# Patient Record
Sex: FEMALE | Race: WHITE | NOT HISPANIC OR LATINO | Employment: OTHER | URBAN - METROPOLITAN AREA
[De-identification: names, ages, dates, MRNs, and addresses within clinical notes are randomized per-mention and may not be internally consistent; named-entity substitution may affect disease eponyms.]

---

## 2021-11-02 DIAGNOSIS — E78.5 HYPERLIPIDEMIA LDL GOAL <100: Primary | ICD-10-CM

## 2022-01-06 ENCOUNTER — HOSPITAL ENCOUNTER (OUTPATIENT)
Dept: CT IMAGING | Facility: CLINIC | Age: 77
Discharge: HOME | End: 2022-01-06
Attending: FAMILY MEDICINE | Admitting: FAMILY MEDICINE
Payer: COMMERCIAL

## 2022-01-06 DIAGNOSIS — E78.5 HYPERLIPIDEMIA LDL GOAL <100: ICD-10-CM

## 2022-01-06 PROCEDURE — 75571 CT HRT W/O DYE W/CA TEST: CPT

## 2022-01-06 PROCEDURE — 75571 CT HRT W/O DYE W/CA TEST: CPT | Mod: 26 | Performed by: INTERNAL MEDICINE

## 2022-02-01 ENCOUNTER — TRANSFERRED RECORDS (OUTPATIENT)
Dept: HEALTH INFORMATION MANAGEMENT | Facility: CLINIC | Age: 77
End: 2022-02-01
Payer: COMMERCIAL

## 2022-02-01 LAB
ALT SERPL-CCNC: 16 U/L (ref 6–29)
AST SERPL-CCNC: 22 U/L (ref 10–35)
CHOLESTEROL (EXTERNAL): 242 MG/DL
CREATININE (EXTERNAL): 0.78 MG/DL (ref 0.6–0.93)
GFR ESTIMATED (EXTERNAL): 73 ML/MIN/1.73M2
GFR ESTIMATED (IF AFRICAN AMERICAN) (EXTERNAL): 84 ML/MIN/1.73M2
GLUCOSE (EXTERNAL): 93 MG/DL (ref 65–99)
HDLC SERPL-MCNC: 63 MG/DL
LDL CHOLESTEROL CALCULATED (EXTERNAL): 154 MG/DL
NON HDL CHOLESTEROL (EXTERNAL): 179 MG/DL
POTASSIUM (EXTERNAL): 3.8 MMOL/L (ref 3.5–5.3)
TRIGLYCERIDES (EXTERNAL): 126 MG/DL

## 2022-03-25 ENCOUNTER — MEDICAL CORRESPONDENCE (OUTPATIENT)
Dept: HEALTH INFORMATION MANAGEMENT | Facility: CLINIC | Age: 77
End: 2022-03-25
Payer: COMMERCIAL

## 2022-03-25 ENCOUNTER — TRANSFERRED RECORDS (OUTPATIENT)
Dept: HEALTH INFORMATION MANAGEMENT | Facility: CLINIC | Age: 77
End: 2022-03-25
Payer: COMMERCIAL

## 2022-03-25 DIAGNOSIS — I48.91 NEW ONSET A-FIB (H): Primary | ICD-10-CM

## 2022-04-20 ENCOUNTER — HOSPITAL ENCOUNTER (OUTPATIENT)
Dept: CARDIOLOGY | Facility: CLINIC | Age: 77
Discharge: HOME | End: 2022-04-20
Attending: PHYSICIAN ASSISTANT | Admitting: PHYSICIAN ASSISTANT
Payer: COMMERCIAL

## 2022-04-20 DIAGNOSIS — I48.91 NEW ONSET A-FIB (H): ICD-10-CM

## 2022-04-20 LAB — LVEF ECHO: NORMAL

## 2022-04-20 PROCEDURE — 93306 TTE W/DOPPLER COMPLETE: CPT

## 2022-04-20 PROCEDURE — 93244 EXT ECG>48HR<7D REV&INTERPJ: CPT | Performed by: INTERNAL MEDICINE

## 2022-04-20 PROCEDURE — 93242 EXT ECG>48HR<7D RECORDING: CPT

## 2022-04-20 PROCEDURE — 93306 TTE W/DOPPLER COMPLETE: CPT | Mod: 26 | Performed by: INTERNAL MEDICINE

## 2022-04-29 DIAGNOSIS — I48.91 NEW ONSET A-FIB (H): Primary | ICD-10-CM

## 2022-05-04 ENCOUNTER — OFFICE VISIT (OUTPATIENT)
Dept: CARDIOLOGY | Facility: CLINIC | Age: 77
End: 2022-05-04
Payer: COMMERCIAL

## 2022-05-04 VITALS
HEIGHT: 70 IN | WEIGHT: 182 LBS | SYSTOLIC BLOOD PRESSURE: 138 MMHG | BODY MASS INDEX: 26.05 KG/M2 | HEART RATE: 78 BPM | DIASTOLIC BLOOD PRESSURE: 88 MMHG | OXYGEN SATURATION: 97 %

## 2022-05-04 DIAGNOSIS — I10 ESSENTIAL HYPERTENSION: ICD-10-CM

## 2022-05-04 DIAGNOSIS — I77.89 ASCENDING AORTA ENLARGEMENT (H): Primary | ICD-10-CM

## 2022-05-04 DIAGNOSIS — I49.1 ATRIAL PREMATURE CONTRACTIONS: ICD-10-CM

## 2022-05-04 PROCEDURE — 93000 ELECTROCARDIOGRAM COMPLETE: CPT | Performed by: INTERNAL MEDICINE

## 2022-05-04 PROCEDURE — 99214 OFFICE O/P EST MOD 30 MIN: CPT | Performed by: INTERNAL MEDICINE

## 2022-05-04 RX ORDER — ATENOLOL 100 MG/1
100 TABLET ORAL DAILY
COMMUNITY
End: 2024-05-03

## 2022-05-04 NOTE — LETTER
5/4/2022    Bill Castro MD  [facility] [address]  [place] [address]    RE: Mercedes Nelson       Dear Colleague,     I had the pleasure of seeing Mercedes Nelson in the [facility].  HISTORY OF PRESENT ILLNESS:  Dx with atrial fibrillation. 2 Kids 3 grandkids  10 years . [employer].     Allergies   etoh none tobacco  yardwork    No family history dad with heart disease sisters with heart disease one PPM        Orders this Visit:  Orders Placed This Encounter   Procedures     EKG 12-lead complete w/read - Clinics (performed today)     Orders Placed This Encounter   Medications     atenolol (TENORMIN) 100 MG tablet     Sig: Take 100 mg by mouth daily     Red Yeast Rice Extract (RED YEAST RICE PO)     ELDERBERRY PO     Medications Discontinued During This Encounter   Medication Reason     atenolol (TENORMIN) 50 MG tablet Medication Reconciliation Clean Up     Calcium Carbonate (CALCIUM 500 PO) Stopped by Patient     aspirin EC 81 MG EC tablet Stopped by Patient     meclizine (ANTIVERT) 25 MG tablet Stopped by Patient       Encounter Diagnosis   Name Primary?     New onset a-fib (H)        CURRENT MEDICATIONS:  Current Outpatient Medications   Medication Sig Dispense Refill     atenolol (TENORMIN) 100 MG tablet Take 100 mg by mouth daily       B Complex Vitamins (VITAMIN  B COMPLEX) tablet Take 1 tablet by mouth daily       Coenzyme Q10 (COQ-10 PO) Take 1 tablet by mouth daily       ELDERBERRY PO        Multiple Vitamin (MULTI-VITAMIN) per tablet Take 1 tablet by mouth daily       Red Yeast Rice Extract (RED YEAST RICE PO)        VITAMIN D, CHOLECALCIFEROL, PO Take 1 capsule by mouth daily         ALLERGIES   No Known Allergies    PAST MEDICAL, SURGICAL, FAMILY, SOCIAL HISTORY:  History was reviewed and updated as needed, see medical record.    Review of Systems:  A 12-point review of systems was completed, see medical record  "for detailed review of systems information.    Physical Exam:  Vitals: /88 (BP Location: Right arm, Patient Position: Sitting, Cuff Size: Adult Regular)   Pulse 78   Ht 1.778 m (5' 10\")   Wt 82.6 kg (182 lb)   SpO2 97%   BMI 26.11 kg/m      Constitutional:           Skin:           Head:           Eyes:           ENT:           Neck:           Chest:           Cardiac:                    Abdomen:           Vascular:                                        Extremities and Back:           Neurological:           ASSESSMENT: should be on statin.        RECOMMENDATIONS:   Continue atenolol  Echocardiogram in one year  Statin therapy        Recent Lab Results:  LIPID RESULTS:  No results found for: CHOL, HDL, LDL, TRIG, CHOLHDLRATIO    LIVER ENZYME RESULTS:  No results found for: AST, ALT    CBC RESULTS:  Lab Results   Component Value Date    WBC 7.0 10/06/2012    RBC 4.74 10/06/2012    HGB 14.8 10/06/2012    HCT 42.3 10/06/2012    MCV 89 10/06/2012    MCH 31.2 10/06/2012    MCHC 35.0 10/06/2012    RDW 13.0 10/06/2012     10/06/2012       BMP RESULTS:  Lab Results   Component Value Date     10/07/2012    POTASSIUM 3.6 10/07/2012    CHLORIDE 106 10/07/2012    CO2 25 10/07/2012    ANIONGAP 4 (L) 10/07/2012    GLC 88 10/07/2012    BUN 10 10/07/2012    CR 0.67 10/07/2012    GFRESTIMATED 88 10/07/2012    GFRESTBLACK >90 10/07/2012    SARAH 8.7 10/07/2012        A1C RESULTS:  No results found for: A1C    INR RESULTS:  No results found for: INR    We greatly appreciate the opportunity to be involved in the care of your patient, Mercedes Nelson.    Sincerely,  Seferino Muhammad MD      CC  Farnaz Billings, AKIN  [facility]  [address]  [place] [address]     Thank you for allowing me to participate in the care of your patient.      Sincerely,     Seferino Muhammad MD     [facility]  "

## 2022-05-04 NOTE — PROGRESS NOTES
"HISTORY OF PRESENT ILLNESS:  Dx with atrial fibrillation. 2 Kids 3 grandkids  10 years . [employer].     Allergies   etoh none tobacco  yardwork    No family history dad with heart disease sisters with heart disease one PPM        Orders this Visit:  Orders Placed This Encounter   Procedures     EKG 12-lead complete w/read - Clinics (performed today)     Orders Placed This Encounter   Medications     atenolol (TENORMIN) 100 MG tablet     Sig: Take 100 mg by mouth daily     Red Yeast Rice Extract (RED YEAST RICE PO)     ELDERBERRY PO     Medications Discontinued During This Encounter   Medication Reason     atenolol (TENORMIN) 50 MG tablet Medication Reconciliation Clean Up     Calcium Carbonate (CALCIUM 500 PO) Stopped by Patient     aspirin EC 81 MG EC tablet Stopped by Patient     meclizine (ANTIVERT) 25 MG tablet Stopped by Patient       Encounter Diagnosis   Name Primary?     New onset a-fib (H)        CURRENT MEDICATIONS:  Current Outpatient Medications   Medication Sig Dispense Refill     atenolol (TENORMIN) 100 MG tablet Take 100 mg by mouth daily       B Complex Vitamins (VITAMIN  B COMPLEX) tablet Take 1 tablet by mouth daily       Coenzyme Q10 (COQ-10 PO) Take 1 tablet by mouth daily       ELDERBERRY PO        Multiple Vitamin (MULTI-VITAMIN) per tablet Take 1 tablet by mouth daily       Red Yeast Rice Extract (RED YEAST RICE PO)        VITAMIN D, CHOLECALCIFEROL, PO Take 1 capsule by mouth daily         ALLERGIES   No Known Allergies    PAST MEDICAL, SURGICAL, FAMILY, SOCIAL HISTORY:  History was reviewed and updated as needed, see medical record.    Review of Systems:  A 12-point review of systems was completed, see medical record for detailed review of systems information.    Physical Exam:  Vitals: /88 (BP Location: Right arm, Patient Position: Sitting, Cuff Size: Adult Regular)   Pulse 78   Ht 1.778 m (5' 10\")   Wt 82.6 kg (182 lb)   SpO2 97%   BMI 26.11 " kg/m      Constitutional:           Skin:           Head:           Eyes:           ENT:           Neck:           Chest:           Cardiac:                    Abdomen:           Vascular:                                        Extremities and Back:           Neurological:           ASSESSMENT: should be on statin.        RECOMMENDATIONS:   Continue atenolol  Echocardiogram in one year  Statin therapy        Recent Lab Results:  LIPID RESULTS:  No results found for: CHOL, HDL, LDL, TRIG, CHOLHDLRATIO    LIVER ENZYME RESULTS:  No results found for: AST, ALT    CBC RESULTS:  Lab Results   Component Value Date    WBC 7.0 10/06/2012    RBC 4.74 10/06/2012    HGB 14.8 10/06/2012    HCT 42.3 10/06/2012    MCV 89 10/06/2012    MCH 31.2 10/06/2012    MCHC 35.0 10/06/2012    RDW 13.0 10/06/2012     10/06/2012       BMP RESULTS:  Lab Results   Component Value Date     10/07/2012    POTASSIUM 3.6 10/07/2012    CHLORIDE 106 10/07/2012    CO2 25 10/07/2012    ANIONGAP 4 (L) 10/07/2012    GLC 88 10/07/2012    BUN 10 10/07/2012    CR 0.67 10/07/2012    GFRESTIMATED 88 10/07/2012    GFRESTBLACK >90 10/07/2012    SARAH 8.7 10/07/2012        A1C RESULTS:  No results found for: A1C    INR RESULTS:  No results found for: INR    We greatly appreciate the opportunity to be involved in the care of your patient, Mercedes Nelson.    Sincerely,  Seferino Muhammad MD      CC  Farnaz Billings PA-C  [facility]  [address] [facility] [address] [facility] [address]  [place] [address]

## 2022-05-04 NOTE — PROGRESS NOTES
Service Date: 2022    CARDIOLOGY CONSULTATION    HISTORY OF PRESENT ILLNESS:  Mercedes Nelson, a 76-year-old woman with osteoarthritis, dyslipidemia and hypertension, was evaluated in consultation at your request for an abnormal preoperative ECG.    Recently, the patient was seen in your office for a preoperative exam prior to cataract surgery.  The patient's screening ECG was abnormal and concerns were raised regarding possible atrial fibrillation.  Anticoagulation  transthoracic echocardiogram and ambulatory monitor were advised along with cardiology consultation.    The patient does not describe any chest, arm, neck, jaw or back discomfort with exertion.  She has been free of syncope, lightheadedness or sustained palpitation.  She denies smoking history, previous MI or diabetes mellitus.    ALLERGIES:  None known.    HABITS:  The patient does not abuse tobacco or alcohol.    SOCIAL HISTORY:  The patient is .  She has 2 children.  She used to work as an .  She currently enjoys gardening.    FAMILY HISTORY:  Her mother  at age 94.  She had hypertension, had mastectomy.  Her father had an MI at age 58 and  at age 63.  She has 2 sisters.  One sister had heart problems with a stent.  The other sister had an MI at age 56.    REVIEW OF SYSTEMS:  A 12-point review of systems was performed.  Outside the issues mentioned in HPI, there are no other complaints.    MEDICATIONS:    1.  Atenolol 100 mg daily.    2.  She is on a number of over-the-counter homeopathic medications.    PHYSICAL EXAMINATION:    GENERAL:  Exam today demonstrates a very pleasant, cooperative and intelligent 76-year-old woman who looks younger than stated age.  VITAL SIGNS:  Her blood pressure was 138/88, her heart rate 78 and regular.  Her height is 1.8 meters, her weight is 82.6 kg, her BMI is 26.  RESPIRATORY:  Her lungs are clear to percussion and auscultation.  CARDIOVASCULAR:  Shows a normal S1 with  a normal S2.  There is no S3.  There is no murmur, rub or click.  Her pulses are symmetrical in the carotid, radial, brachial femoral, popliteal, dorsalis pedis and posterior tibials.  ABDOMINAL EXAMINATION:  Bowel sounds are present in all 4 quadrants.  Liver percusses to 7 cm.  Spleen is not palpable.  The aorta is not tender.  LOWER EXTREMITIES:  There is no swelling, cyanosis or clubbing.  NEUROLOGIC:  Cranial nerves II through XII are intact.  Her strength equal and symmetrical.  She displays normal insight and judgment.    IMAGING STUDIES:  I have reviewed both her ECGs from the office and from the ambulatory monitor.  Although the patient has frequent atrial premature contractions, there has been no documentation of atrial fibrillation.  Her echo shows normal ejection fraction with marked left atrial enlargement but no significant valvular stenosis or insufficiency. Her ECG today shows a sinus rhythm with frequent PACs    The patient underwent a CT coronary calcium scan in the past which demonstrated very mild calcification but there was moderate dilation of the ascending aorta measured at 43 cm.    ASSESSMENT:  Ms. Nelson has frequent premature atrial contractions, left atrial enlargement and is at risk for future atrial fibrillation but thus far, does not have documented atrial fibrillation. I would not recommend anticoagulation in the absence of documented atrial fibrillation. .  She will require close surveillance, as she is at risk for developing atrial arrhythmias in the future.    In view of the patient's moderate aortic dilation and coronary calcification, I would advise statin therapy.  It would be reasonable to start atorvastatin at 20-40 mg daily.  She wants to discuss this recommendation with you in more detail.    We discussed the benefits of a Mediterranean-style weight loss diet and a regular exercise program.  We prefer to maintain her blood pressure in the range of 130/80 Hg  if possible.   If she cannot achieve this goal with exercise and diet, you may wish to consider switching her from atenolol to carvedilol, a more potent anti-hypertensive beta blocker.     RECOMMENDATIONS:    1.  Mediterranean-style weight loss diet.  2.  Regular exercise program.  3.  No indication for anticoagulation at this time.  4.  Followup visit with me in about 1 year with an echo at that time to reassess the aortic dimensions and EKG to exclude atrial fibrillation .  5.  I have asked the patient to discuss statin therapy with you.  We would atorvastatin 20 to 40 mg daily.   6. If her blood pressure remains above 130 /80 despite lifestyle intervention, you  may wish to consider switching her from atenolol to an equivalent dose of carvedilol.    We appreciate the opportunity to care for your patient, Mikecy Mcpherson.    Seferino Muhammad MD     cc:  ward Echevarria,[facility]  [address] [facility], [address] [address]    Seferino Muhammad MD        D: 2022   T: 2022   MT: kendall    Name:     LILIAN MCPHERSONIAN WARDAparna  MRN:      [id number]        Account:      [id number]   :      1945           Service Date: 2022       Document: [id number]

## 2022-06-05 ENCOUNTER — HEALTH MAINTENANCE LETTER (OUTPATIENT)
Age: 77
End: 2022-06-05

## 2022-10-15 ENCOUNTER — HEALTH MAINTENANCE LETTER (OUTPATIENT)
Age: 77
End: 2022-10-15

## 2023-02-13 ENCOUNTER — TRANSFERRED RECORDS (OUTPATIENT)
Dept: HEALTH INFORMATION MANAGEMENT | Facility: CLINIC | Age: 78
End: 2023-02-13

## 2023-03-20 ENCOUNTER — TRANSFERRED RECORDS (OUTPATIENT)
Dept: HEALTH INFORMATION MANAGEMENT | Facility: CLINIC | Age: 78
End: 2023-03-20

## 2023-03-26 ENCOUNTER — HEALTH MAINTENANCE LETTER (OUTPATIENT)
Age: 78
End: 2023-03-26

## 2023-06-19 ENCOUNTER — MEDICAL CORRESPONDENCE (OUTPATIENT)
Dept: HEALTH INFORMATION MANAGEMENT | Facility: CLINIC | Age: 78
End: 2023-06-19
Payer: COMMERCIAL

## 2023-06-29 ENCOUNTER — HOSPITAL ENCOUNTER (OUTPATIENT)
Dept: CARDIOLOGY | Facility: CLINIC | Age: 78
Discharge: HOME | End: 2023-06-29
Attending: INTERNAL MEDICINE | Admitting: INTERNAL MEDICINE
Payer: COMMERCIAL

## 2023-06-29 ENCOUNTER — MEDICAL CORRESPONDENCE (OUTPATIENT)
Dept: HEALTH INFORMATION MANAGEMENT | Facility: CLINIC | Age: 78
End: 2023-06-29

## 2023-06-29 DIAGNOSIS — I10 ESSENTIAL HYPERTENSION: ICD-10-CM

## 2023-06-29 DIAGNOSIS — I49.1 ATRIAL PREMATURE CONTRACTIONS: ICD-10-CM

## 2023-06-29 DIAGNOSIS — I77.89 ASCENDING AORTA ENLARGEMENT (H): ICD-10-CM

## 2023-06-29 LAB — LVEF ECHO: NORMAL

## 2023-06-29 PROCEDURE — 93306 TTE W/DOPPLER COMPLETE: CPT

## 2023-06-29 PROCEDURE — 93306 TTE W/DOPPLER COMPLETE: CPT | Mod: 26 | Performed by: INTERNAL MEDICINE

## 2023-07-13 ENCOUNTER — OFFICE VISIT (OUTPATIENT)
Dept: CARDIOLOGY | Facility: CLINIC | Age: 78
End: 2023-07-13
Attending: INTERNAL MEDICINE
Payer: COMMERCIAL

## 2023-07-13 VITALS
HEIGHT: 70 IN | BODY MASS INDEX: 26.13 KG/M2 | SYSTOLIC BLOOD PRESSURE: 138 MMHG | WEIGHT: 182.5 LBS | HEART RATE: 80 BPM | DIASTOLIC BLOOD PRESSURE: 88 MMHG | OXYGEN SATURATION: 98 %

## 2023-07-13 DIAGNOSIS — I49.1 ATRIAL PREMATURE CONTRACTIONS: ICD-10-CM

## 2023-07-13 DIAGNOSIS — I10 ESSENTIAL HYPERTENSION: ICD-10-CM

## 2023-07-13 DIAGNOSIS — I77.89 ASCENDING AORTA ENLARGEMENT (H): ICD-10-CM

## 2023-07-13 PROCEDURE — 99214 OFFICE O/P EST MOD 30 MIN: CPT | Performed by: INTERNAL MEDICINE

## 2023-07-13 PROCEDURE — 93000 ELECTROCARDIOGRAM COMPLETE: CPT | Performed by: INTERNAL MEDICINE

## 2023-07-13 NOTE — LETTER
7/13/2023    Bill Castro MD  [address]  [facility] [address]    RE: Mercedes Nelson       Dear Colleague,     I had the pleasure of seeing Mercedes Nelson in the [facility].  HISTORY OF PRESENT ILLNESS:  Mercedes Nelson, a 76-year-old woman with osteoarthritis, dyslipidemia and hypertension frequent premature atrial contractions, and ascending aortic aneurysm was seen today at your request for follow up.    I saw the patient initially on 5/4/2022 to evaluate an abnormal  ECG and moderate dilation of the ascending aorta noted on a screening echocardiogram prior to elective cataract surgery.  There had been concern that the patient was in atrial fibrillation, ever view of all of her ECGs and ambulatory monitoring showed only premature atrial contractions with low atrial voltage but no evidence of atrial fibrillation.  We advised continued guideline directed medical therapy hypertension and vascular disease and advised follow-up in 1 year with an echo and an ECG    Patient's been free of cardiovascular symptoms since last seen.  She specifically denies chest pain dyspnea, focal neurologic deficit, frequent palpitations lightheadedness dizziness or sustained tachycardia.  I have personally reviewed her ECG and echo from today.  She is in a sinus rhythm although atrial that is low.  Her echo shows no change in the size of the ascending aorta which still measures about 4.0 cm.    The patient had a mechanical fall several weeks ago resulted in a nondisplaced fracture of one of her hand bones and she is currently in a arm cast.      Orders this Visit:  Orders Placed This Encounter   Procedures    EKG 12-lead complete w/read - Clinics (performed today)     No orders of the defined types were placed in this encounter.    There are no discontinued medications.    Encounter Diagnoses   Name Primary?    Atrial premature contractions     Ascending aorta enlargement (H)     Essential  "hypertension        CURRENT MEDICATIONS:  Current Outpatient Medications   Medication Sig Dispense Refill    atenolol (TENORMIN) 100 MG tablet Take 100 mg by mouth daily      B Complex Vitamins (VITAMIN  B COMPLEX) tablet Take 1 tablet by mouth daily      Coenzyme Q10 (COQ-10 PO) Take 1 tablet by mouth daily      ELDERBERRY PO       Multiple Vitamin (MULTI-VITAMIN) per tablet Take 1 tablet by mouth daily      Red Yeast Rice Extract (RED YEAST RICE PO)       VITAMIN D, CHOLECALCIFEROL, PO Take 1 capsule by mouth daily         ALLERGIES   No Known Allergies    PAST MEDICAL, SURGICAL, FAMILY, SOCIAL HISTORY:  History was reviewed and updated as needed, see medical record.    Review of Systems:  A 12-point review of systems was completed, see medical record for detailed review of systems information.    Physical Exam:  Vitals: BP (!) 142/90 (BP Location: Right arm, Patient Position: Sitting, Cuff Size: Adult Large)   Pulse 80   Ht 1.778 m (5' 10\")   Wt 82.8 kg (182 lb 8 oz)   SpO2 98%   BMI 26.19 kg/m        Pleasant, cooperative, youthful appearing    Lungs clear to percussion auscultation    Cardiovascular normal S1 normal S2 no murmur or click    Pulses full and symmetrical in the carotid radial brachial femoral popliteal dorsalis pedis and posterior tibials.      ASSESSMENT: The patient remains asymptomatic, in a regular sinus rhythm, with no change in mild dilation of the ascending aorta since her last evaluation.  At this point I do not believe any further cardiac testing or intervention is warranted in the absence of new symptoms.  We have discussed the benefits of a Mediterranean-style diet, regular exercise program, and compliance with blood pressure and lipid-lowering therapy.  I would recommend continued close follow-up for blood pressure with a target blood pressure of 130/80 or less.       RECOMMENDATIONS:   1) Continue present medical therapy  2) Regular exercise program 30 to 40 minutes daily   3) " Mediterranean style diet  4) follow up prn      Recent Lab Results:    ECG demonstrates a normal sinus rhythm with low atrial voltage otherwise unremarkable.    Echo shows a normal ejection fraction of 65%, mild concentric LVH, no significant valvular stenosis or insufficiency.  The right measures 3.9 cm in the ascending aorta measures 4.0 cm, unchanged since her last echo.  LIPID RESULTS:  Lab Results   Component Value Date    TRIG 66 02/13/2023       LIVER ENZYME RESULTS:  No results found for: AST, ALT    CBC RESULTS:  Lab Results   Component Value Date    WBC 7.0 10/06/2012    RBC 4.74 10/06/2012    HGB 14.8 10/06/2012    HCT 42.3 10/06/2012    MCV 89 10/06/2012    MCH 31.2 10/06/2012    MCHC 35.0 10/06/2012    RDW 13.0 10/06/2012     10/06/2012       BMP RESULTS:  Lab Results   Component Value Date     10/07/2012    POTASSIUM 3.6 10/07/2012    CHLORIDE 102 02/13/2023    CHLORIDE 106 10/07/2012    CO2 25 10/07/2012    ANIONGAP 4 (L) 10/07/2012    GLC 88 10/07/2012    BUN 10 10/07/2012    CR 0.67 10/07/2012    GFRESTIMATED 88 10/07/2012    GFRESTBLACK >90 10/07/2012    SARAH 8.7 10/07/2012        A1C RESULTS:  No results found for: A1C    INR RESULTS:  No results found for: INR    We greatly appreciate the opportunity to be involved in the care of your patient, Mercedes Nelson.    Sincerely,  Seferino Muhammad MD        Seferino Muhammad MD  [address]  [place] [id number]

## 2023-07-13 NOTE — PROGRESS NOTES
HISTORY OF PRESENT ILLNESS:  Mercedes Nelson, a 76-year-old woman with osteoarthritis, dyslipidemia and hypertension frequent premature atrial contractions, and ascending aortic aneurysm was seen today at your request for follow up.    I saw the patient initially on 5/4/2022 to evaluate an abnormal  ECG and moderate dilation of the ascending aorta noted on a screening echocardiogram prior to elective cataract surgery.  There had been concern that the patient was in atrial fibrillation, ever view of all of her ECGs and ambulatory monitoring showed only premature atrial contractions with low atrial voltage but no evidence of atrial fibrillation.  We advised continued guideline directed medical therapy hypertension and vascular disease and advised follow-up in 1 year with an echo and an ECG    Patient's been free of cardiovascular symptoms since last seen.  She specifically denies chest pain dyspnea, focal neurologic deficit, frequent palpitations lightheadedness dizziness or sustained tachycardia.  I have personally reviewed her ECG and echo from today.  She is in a sinus rhythm although atrial that is low.  Her echo shows no change in the size of the ascending aorta which still measures about 4.0 cm.    The patient had a mechanical fall several weeks ago resulted in a nondisplaced fracture of one of her hand bones and she is currently in a arm cast.      Orders this Visit:  Orders Placed This Encounter   Procedures     EKG 12-lead complete w/read - Clinics (performed today)     No orders of the defined types were placed in this encounter.    There are no discontinued medications.    Encounter Diagnoses   Name Primary?     Atrial premature contractions      Ascending aorta enlargement (H)      Essential hypertension        CURRENT MEDICATIONS:  Current Outpatient Medications   Medication Sig Dispense Refill     atenolol (TENORMIN) 100 MG tablet Take 100 mg by mouth daily       B Complex Vitamins (VITAMIN  B COMPLEX)  "tablet Take 1 tablet by mouth daily       Coenzyme Q10 (COQ-10 PO) Take 1 tablet by mouth daily       ELDERBERRY PO        Multiple Vitamin (MULTI-VITAMIN) per tablet Take 1 tablet by mouth daily       Red Yeast Rice Extract (RED YEAST RICE PO)        VITAMIN D, CHOLECALCIFEROL, PO Take 1 capsule by mouth daily         ALLERGIES   No Known Allergies    PAST MEDICAL, SURGICAL, FAMILY, SOCIAL HISTORY:  History was reviewed and updated as needed, see medical record.    Review of Systems:  A 12-point review of systems was completed, see medical record for detailed review of systems information.    Physical Exam:  Vitals: BP (!) 142/90 (BP Location: Right arm, Patient Position: Sitting, Cuff Size: Adult Large)   Pulse 80   Ht 1.778 m (5' 10\")   Wt 82.8 kg (182 lb 8 oz)   SpO2 98%   BMI 26.19 kg/m        Pleasant, cooperative, youthful appearing    Lungs clear to percussion auscultation    Cardiovascular normal S1 normal S2 no murmur or click    Pulses full and symmetrical in the carotid radial brachial femoral popliteal dorsalis pedis and posterior tibials.      ASSESSMENT: The patient remains asymptomatic, in a regular sinus rhythm, with no change in mild dilation of the ascending aorta since her last evaluation.  At this point I do not believe any further cardiac testing or intervention is warranted in the absence of new symptoms.  We have discussed the benefits of a Mediterranean-style diet, regular exercise program, and compliance with blood pressure and lipid-lowering therapy.  I would recommend continued close follow-up for blood pressure with a target blood pressure of 130/80 or less.       RECOMMENDATIONS:   1) Continue present medical therapy  2) Regular exercise program 30 to 40 minutes daily   3) Mediterranean style diet  4) follow up prn      Recent Lab Results:    ECG demonstrates a normal sinus rhythm with low atrial voltage otherwise unremarkable.    Echo shows a normal ejection fraction of 65%, mild " concentric LVH, no significant valvular stenosis or insufficiency.  The right measures 3.9 cm in the ascending aorta measures 4.0 cm, unchanged since her last echo.  LIPID RESULTS:  Lab Results   Component Value Date    TRIG 66 02/13/2023       LIVER ENZYME RESULTS:  No results found for: AST, ALT    CBC RESULTS:  Lab Results   Component Value Date    WBC 7.0 10/06/2012    RBC 4.74 10/06/2012    HGB 14.8 10/06/2012    HCT 42.3 10/06/2012    MCV 89 10/06/2012    MCH 31.2 10/06/2012    MCHC 35.0 10/06/2012    RDW 13.0 10/06/2012     10/06/2012       BMP RESULTS:  Lab Results   Component Value Date     10/07/2012    POTASSIUM 3.6 10/07/2012    CHLORIDE 102 02/13/2023    CHLORIDE 106 10/07/2012    CO2 25 10/07/2012    ANIONGAP 4 (L) 10/07/2012    GLC 88 10/07/2012    BUN 10 10/07/2012    CR 0.67 10/07/2012    GFRESTIMATED 88 10/07/2012    GFRESTBLACK >90 10/07/2012    SARAH 8.7 10/07/2012        A1C RESULTS:  No results found for: A1C    INR RESULTS:  No results found for: INR    We greatly appreciate the opportunity to be involved in the care of your patient, Mercedes Nelson.    Sincerely,  Seferino Muhammad MD        Seferino Muhammad MD  [address]  [place] [id number]

## 2023-11-30 PROBLEM — I10 BENIGN ESSENTIAL HYPERTENSION: Status: ACTIVE | Noted: 2023-11-30

## 2023-11-30 PROBLEM — I10 BENIGN ESSENTIAL HYPERTENSION: Status: RESOLVED | Noted: 2023-11-30 | Resolved: 2023-11-30

## 2023-12-01 ENCOUNTER — OFFICE VISIT (OUTPATIENT)
Dept: FAMILY MEDICINE | Facility: CLINIC | Age: 78
End: 2023-12-01
Payer: COMMERCIAL

## 2023-12-01 VITALS
OXYGEN SATURATION: 96 % | TEMPERATURE: 97.7 F | HEART RATE: 61 BPM | WEIGHT: 184.4 LBS | SYSTOLIC BLOOD PRESSURE: 162 MMHG | HEIGHT: 70 IN | RESPIRATION RATE: 18 BRPM | DIASTOLIC BLOOD PRESSURE: 97 MMHG | BODY MASS INDEX: 26.4 KG/M2

## 2023-12-01 DIAGNOSIS — E78.5 DYSLIPIDEMIA: ICD-10-CM

## 2023-12-01 DIAGNOSIS — I71.21 ANEURYSM OF ASCENDING AORTA WITHOUT RUPTURE (H): ICD-10-CM

## 2023-12-01 DIAGNOSIS — Z01.818 PREOP GENERAL PHYSICAL EXAM: Primary | ICD-10-CM

## 2023-12-01 DIAGNOSIS — M19.90 OSTEOARTHRITIS, UNSPECIFIED OSTEOARTHRITIS TYPE, UNSPECIFIED SITE: ICD-10-CM

## 2023-12-01 DIAGNOSIS — Z23 NEED FOR TDAP VACCINATION: ICD-10-CM

## 2023-12-01 DIAGNOSIS — I10 BENIGN ESSENTIAL HYPERTENSION: ICD-10-CM

## 2023-12-01 LAB
ANION GAP SERPL CALCULATED.3IONS-SCNC: 12 MMOL/L (ref 7–15)
BUN SERPL-MCNC: 14 MG/DL (ref 8–23)
CALCIUM SERPL-MCNC: 9.6 MG/DL (ref 8.8–10.2)
CHLORIDE SERPL-SCNC: 100 MMOL/L (ref 98–107)
CREAT SERPL-MCNC: 0.7 MG/DL (ref 0.51–0.95)
DEPRECATED HCO3 PLAS-SCNC: 29 MMOL/L (ref 22–29)
EGFRCR SERPLBLD CKD-EPI 2021: 87 ML/MIN/1.73M2
ERYTHROCYTE [DISTWIDTH] IN BLOOD BY AUTOMATED COUNT: 12.9 % (ref 10–15)
GLUCOSE SERPL-MCNC: 102 MG/DL (ref 70–99)
HCT VFR BLD AUTO: 44.1 % (ref 35–47)
HGB BLD-MCNC: 14.3 G/DL (ref 11.7–15.7)
MCH RBC QN AUTO: 30.2 PG (ref 26.5–33)
MCHC RBC AUTO-ENTMCNC: 32.4 G/DL (ref 31.5–36.5)
MCV RBC AUTO: 93 FL (ref 78–100)
PLATELET # BLD AUTO: 208 10E3/UL (ref 150–450)
POTASSIUM SERPL-SCNC: 3.4 MMOL/L (ref 3.4–5.3)
RBC # BLD AUTO: 4.73 10E6/UL (ref 3.8–5.2)
SODIUM SERPL-SCNC: 141 MMOL/L (ref 135–145)
WBC # BLD AUTO: 7.5 10E3/UL (ref 4–11)

## 2023-12-01 PROCEDURE — 36415 COLL VENOUS BLD VENIPUNCTURE: CPT

## 2023-12-01 PROCEDURE — 99204 OFFICE O/P NEW MOD 45 MIN: CPT | Mod: 25

## 2023-12-01 PROCEDURE — 80048 BASIC METABOLIC PNL TOTAL CA: CPT

## 2023-12-01 PROCEDURE — 85027 COMPLETE CBC AUTOMATED: CPT

## 2023-12-01 PROCEDURE — 93000 ELECTROCARDIOGRAM COMPLETE: CPT

## 2023-12-01 RX ORDER — ROSUVASTATIN CALCIUM 20 MG/1
20 TABLET, COATED ORAL DAILY
COMMUNITY

## 2023-12-01 RX ORDER — RESPIRATORY SYNCYTIAL VIRUS VACCINE 120MCG/0.5
0.5 KIT INTRAMUSCULAR ONCE
Qty: 1 EACH | Refills: 0 | Status: CANCELLED | OUTPATIENT
Start: 2023-12-01 | End: 2023-12-01

## 2023-12-01 ASSESSMENT — PAIN SCALES - GENERAL: PAINLEVEL: NO PAIN (0)

## 2023-12-01 NOTE — PATIENT INSTRUCTIONS
Take your medications at night as normal EXCEPT please hold fish oil, NSAIDS (Aleve/Ibuprofen), aspirin for a week prior to surgery    Can take Tylenol if needed    Please check blood pressure at home and let me know if they continue to remain elevated (greater than 140/90)

## 2023-12-01 NOTE — PROGRESS NOTES
[address], [address]  [facility] [address]  Phone: [phone]  Primary Provider: Bill Castro  Pre-op Performing Provider: RULA HUGHES      PREOPERATIVE EVALUATION:  Today's date: 12/1/2023    Mercedes is a 78 year old, presenting for the following:  Pre-Op Exam    Surgical Information:  Surgery/Procedure: Right knee replacement  Surgery Location: [facility]  Surgeon: Dr. Jarrett  Surgery Date: 12/6/23  Time of Surgery: 1:30 PM  Where patient plans to recover: At home with family  Fax number for surgical facility: [phone]    Assessment & Plan     The proposed surgical procedure is considered INTERMEDIATE risk.    Preop general physical exam  EKG with first-degree AV block, patient asymptomatic  Okay to proceed with surgery    Benign essential hypertension  Blood pressure elevated in clinic. Plan to continue taking atenolol. Has appointment next month, should plan to follow-up and address if still elevated. Will have patient take blood pressures at home if able and record findings.     Aneurysm of ascending aorta without rupture (H24)  -Had an echocardiogram done on 6/29, follows with cardiology    Interpretation Summary     There is mild concentric left ventricular hypertrophy.  The visual ejection fraction is 60-65%.  Grade I or early diastolic dysfunction.  There is mild biatrial enlargement.  Borderline aortic root dilatation, 3.9 cm  The ascending aorta is Mildly dilated, 4.0 cm  The above measurements may be mildly increased compared with study from last  year.    Dyslipidemia  -Reports taking Rosuvastatin     Osteoarthritis, unspecified osteoarthritis type, unspecified site  Bilateral knee arthritis, planning to undergo right knee arthroplasty       - No identified additional risk factors other than previously addressed    Antiplatelet or Anticoagulation Medication Instructions:   - Patient is on no antiplatelet or anticoagulation  medications.    Additional Medication Instructions:  Patient is to take all scheduled medications on the day of surgery EXCEPT for modifications listed below: No fish oil for 1 week    RECOMMENDATION:  APPROVAL GIVEN to proceed with proposed procedure, without further diagnostic evaluation.      Subjective       HPI related to upcoming procedure:   Pleasant 78-year-old female presenting for a pre-op exam for a R TKA to be done next week through [facility]. Generally feeling well, no recent illness. No CP, SOB, dizziness/lightheadedness, lower extremity edema. Able to walk several blocks without any dyspnea on exertion or chest pains, however activity is usually limited by knee pain.  Typically does not take anything for pain but was advised she could take Tylenol prior to surgery if needed.  Plans to take all medications as prescribed the night before surgery.  Was advised by surgery to hold her fish oil so she has been doing that.          12/1/2023     9:06 AM   Preop Questions   1. Have you ever had a heart attack or stroke? No   2. Have you ever had surgery on your heart or blood vessels, such as a stent placement, a coronary artery bypass, or surgery on an artery in your head, neck, heart, or legs? No   3. Do you have chest pain with activity? No   4. Do you have a history of  heart failure? No   5. Do you currently have a cold, bronchitis or symptoms of other infection? No   6. Do you have a cough, shortness of breath, or wheezing? No   7. Do you or anyone in your family have previous history of blood clots? No   8. Do you or does anyone in your family have a serious bleeding problem such as prolonged bleeding following surgeries or cuts? No   9. Have you ever had problems with anemia or been told to take iron pills? No   10. Have you had any abnormal blood loss such as black, tarry or bloody stools, or abnormal vaginal bleeding? No   11. Have you ever had a blood transfusion? No   12. Are you willing to have a blood  transfusion if it is medically needed before, during, or after your surgery? Yes   13. Have you or any of your relatives ever had problems with anesthesia? YES - sisters had post-op nausea   14. Do you have sleep apnea, excessive snoring or daytime drowsiness? No   15. Do you have any artifical heart valves or other implanted medical devices like a pacemaker, defibrillator, or continuous glucose monitor? No   16. Do you have artificial joints? No   17. Are you allergic to latex? No       Status of Chronic Conditions:  See problem list for active medical problems.  Problems all longstanding and stable, except as noted/documented.  See ROS for pertinent symptoms related to these conditions.    Review of Systems  Constitutional, neuro, ENT, endocrine, pulmonary, cardiac, gastrointestinal, genitourinary, musculoskeletal, integument and psychiatric systems are negative, except as otherwise noted.    Patient Active Problem List    Diagnosis Date Noted    Dizziness 10/06/2012     Priority: Medium      Past Medical History:   Diagnosis Date    Dyslipidemia     Hypertension     Osteoarthritis      Past Surgical History:   Procedure Laterality Date    CATARACT EXTRACTION, BILATERAL  04/2023     Current Outpatient Medications   Medication Sig Dispense Refill    atenolol (TENORMIN) 100 MG tablet Take 100 mg by mouth daily      B Complex Vitamins (VITAMIN  B COMPLEX) tablet Take 1 tablet by mouth daily      Coenzyme Q10 (COQ-10 PO) Take 1 tablet by mouth daily      ELDERBERRY PO       Multiple Vitamin (MULTI-VITAMIN) per tablet Take 1 tablet by mouth daily      Red Yeast Rice Extract (RED YEAST RICE PO)       rosuvastatin (CRESTOR) 20 MG tablet Take 20 mg by mouth daily      VITAMIN D, CHOLECALCIFEROL, PO Take 1 capsule by mouth daily         No Known Allergies     Social History     Tobacco Use    Smoking status: Never    Smokeless tobacco: Never   Substance Use Topics    Alcohol use: No           Objective     BP (!) 162/97  "(BP Location: Right arm, Patient Position: Sitting, Cuff Size: Adult Large)   Pulse 61   Temp 97.7  F (36.5  C) (Temporal)   Resp 18   Ht 1.778 m (5' 10\")   Wt 83.6 kg (184 lb 6.4 oz)   SpO2 96%   BMI 26.46 kg/m      Physical Exam    GENERAL APPEARANCE: healthy, alert and no distress     EYES: EOMI, PERRL     RESP: lungs clear to auscultation - no rales, rhonchi or wheezes     CV: regular rates and rhythm, normal S1 S2, no S3 or S4 and no murmur, click or rub     ABDOMEN:  soft, nontender, no HSM or masses and bowel sounds normal     MS: extremities normal- no gross deformities noted, no evidence of inflammation in joints, FROM in all extremities.     NEURO: Normal strength and tone, sensory exam grossly normal, mentation intact and speech normal     PSYCH: mentation appears normal. and affect normal/bright      Diagnostics:  Labs pending at this time.  Results will be reviewed when available.   EKG: appears normal, NSR, normal axis, normal intervals, no acute ST/T changes c/w ischemia, no LVH by voltage criteria, hx of RBBB . New 1st degree AV block noted        Revised Cardiac Risk Index (RCRI):  The patient has the following serious cardiovascular risks for perioperative complications:   - No serious cardiac risks = 0 points     RCRI Interpretation: 0 points: Class I (very low risk - 0.4% complication rate)         Signed Electronically by: Honey Jones DNP  Copy of this evaluation report is provided to requesting physician.      "

## 2023-12-04 ENCOUNTER — HOSPITAL ENCOUNTER (EMERGENCY)
Facility: CLINIC | Age: 78
Discharge: HOME | End: 2023-12-05
Attending: EMERGENCY MEDICINE | Admitting: EMERGENCY MEDICINE
Payer: COMMERCIAL

## 2023-12-04 ENCOUNTER — TRANSFERRED RECORDS (OUTPATIENT)
Dept: HEALTH INFORMATION MANAGEMENT | Facility: CLINIC | Age: 78
End: 2023-12-04

## 2023-12-04 DIAGNOSIS — I10 ESSENTIAL HYPERTENSION: ICD-10-CM

## 2023-12-04 LAB
ANION GAP SERPL CALCULATED.3IONS-SCNC: 9 MMOL/L (ref 7–15)
BASOPHILS # BLD AUTO: 0 10E3/UL (ref 0–0.2)
BASOPHILS NFR BLD AUTO: 1 %
BUN SERPL-MCNC: 23.8 MG/DL (ref 8–23)
CALCIUM SERPL-MCNC: 9 MG/DL (ref 8.8–10.2)
CHLORIDE SERPL-SCNC: 102 MMOL/L (ref 98–107)
CREAT SERPL-MCNC: 0.81 MG/DL (ref 0.51–0.95)
DEPRECATED HCO3 PLAS-SCNC: 28 MMOL/L (ref 22–29)
EGFRCR SERPLBLD CKD-EPI 2021: 73 ML/MIN/1.73M2
EOSINOPHIL # BLD AUTO: 0.1 10E3/UL (ref 0–0.7)
EOSINOPHIL NFR BLD AUTO: 1 %
ERYTHROCYTE [DISTWIDTH] IN BLOOD BY AUTOMATED COUNT: 13.2 % (ref 10–15)
GLUCOSE SERPL-MCNC: 100 MG/DL (ref 70–99)
HCT VFR BLD AUTO: 39.5 % (ref 35–47)
HGB BLD-MCNC: 13 G/DL (ref 11.7–15.7)
IMM GRANULOCYTES # BLD: 0 10E3/UL
IMM GRANULOCYTES NFR BLD: 0 %
LYMPHOCYTES # BLD AUTO: 1.2 10E3/UL (ref 0.8–5.3)
LYMPHOCYTES NFR BLD AUTO: 20 %
MCH RBC QN AUTO: 30.7 PG (ref 26.5–33)
MCHC RBC AUTO-ENTMCNC: 32.9 G/DL (ref 31.5–36.5)
MCV RBC AUTO: 93 FL (ref 78–100)
MONOCYTES # BLD AUTO: 0.6 10E3/UL (ref 0–1.3)
MONOCYTES NFR BLD AUTO: 10 %
NEUTROPHILS # BLD AUTO: 4.1 10E3/UL (ref 1.6–8.3)
NEUTROPHILS NFR BLD AUTO: 68 %
NRBC # BLD AUTO: 0 10E3/UL
NRBC BLD AUTO-RTO: 0 /100
PLATELET # BLD AUTO: 197 10E3/UL (ref 150–450)
POTASSIUM SERPL-SCNC: 3.6 MMOL/L (ref 3.4–5.3)
RBC # BLD AUTO: 4.23 10E6/UL (ref 3.8–5.2)
SODIUM SERPL-SCNC: 139 MMOL/L (ref 135–145)
WBC # BLD AUTO: 6 10E3/UL (ref 4–11)

## 2023-12-04 PROCEDURE — 99284 EMERGENCY DEPT VISIT MOD MDM: CPT

## 2023-12-04 PROCEDURE — 85025 COMPLETE CBC W/AUTO DIFF WBC: CPT | Performed by: EMERGENCY MEDICINE

## 2023-12-04 PROCEDURE — 36415 COLL VENOUS BLD VENIPUNCTURE: CPT | Performed by: EMERGENCY MEDICINE

## 2023-12-04 PROCEDURE — 80048 BASIC METABOLIC PNL TOTAL CA: CPT | Performed by: EMERGENCY MEDICINE

## 2023-12-04 PROCEDURE — 250N000013 HC RX MED GY IP 250 OP 250 PS 637: Performed by: EMERGENCY MEDICINE

## 2023-12-04 PROCEDURE — 93005 ELECTROCARDIOGRAM TRACING: CPT

## 2023-12-04 RX ORDER — LISINOPRIL 10 MG/1
10 TABLET ORAL ONCE
Status: COMPLETED | OUTPATIENT
Start: 2023-12-04 | End: 2023-12-04

## 2023-12-04 RX ORDER — LISINOPRIL 10 MG/1
10 TABLET ORAL DAILY
Qty: 30 TABLET | Refills: 0 | Status: SHIPPED | OUTPATIENT
Start: 2023-12-04 | End: 2024-01-04

## 2023-12-04 RX ADMIN — LISINOPRIL 10 MG: 10 TABLET ORAL at 23:22

## 2023-12-04 ASSESSMENT — ACTIVITIES OF DAILY LIVING (ADL): ADLS_ACUITY_SCORE: 37

## 2023-12-05 VITALS
HEART RATE: 65 BPM | TEMPERATURE: 98 F | OXYGEN SATURATION: 96 % | RESPIRATION RATE: 14 BRPM | SYSTOLIC BLOOD PRESSURE: 190 MMHG | DIASTOLIC BLOOD PRESSURE: 118 MMHG

## 2023-12-05 LAB
ATRIAL RATE - MUSE: 69 BPM
ATRIAL RATE - MUSE: NORMAL BPM
DIASTOLIC BLOOD PRESSURE - MUSE: NORMAL MMHG
DIASTOLIC BLOOD PRESSURE - MUSE: NORMAL MMHG
HOLD SPECIMEN: NORMAL
INTERPRETATION ECG - MUSE: NORMAL
INTERPRETATION ECG - MUSE: NORMAL
P AXIS - MUSE: 59 DEGREES
P AXIS - MUSE: NORMAL DEGREES
PR INTERVAL - MUSE: 202 MS
PR INTERVAL - MUSE: NORMAL MS
QRS DURATION - MUSE: 104 MS
QRS DURATION - MUSE: 106 MS
QT - MUSE: 438 MS
QT - MUSE: 448 MS
QTC - MUSE: 475 MS
QTC - MUSE: 480 MS
R AXIS - MUSE: -34 DEGREES
R AXIS - MUSE: -39 DEGREES
SYSTOLIC BLOOD PRESSURE - MUSE: NORMAL MMHG
SYSTOLIC BLOOD PRESSURE - MUSE: NORMAL MMHG
T AXIS - MUSE: 11 DEGREES
T AXIS - MUSE: 32 DEGREES
VENTRICULAR RATE- MUSE: 69 BPM
VENTRICULAR RATE- MUSE: 71 BPM

## 2023-12-05 NOTE — ED TRIAGE NOTES
Pt here for evaluation of high blood pressures. Pt reports that she had a pre-op on Monday and her BP was in the 160's systolic; pt was told to recheck her BPs at home. Tonight the pt noted that her BPs were high at home systolic BP in the 180's. Pt reports that she is on BP meds and last took them around 2030 tonight. Pt denies any CP or SOB, no headaches or blurred vision. Pt A&Ox4, ambulatory.    Recheck BP after sitting during triage-194/120.     Triage Assessment (Adult)       Row Name 12/04/23 5975          Triage Assessment    Airway WDL WDL        Respiratory WDL    Respiratory WDL WDL        Skin Circulation/Temperature WDL    Skin Circulation/Temperature WDL WDL        Cognitive/Neuro/Behavioral WDL    Cognitive/Neuro/Behavioral WDL WDL

## 2023-12-05 NOTE — ED PROVIDER NOTES
History     Chief Complaint:  Hypertension     The history is provided by the patient.      Mercedes Nelson is a 78 year old female with history of hypertension who presents to the ED with her  for evaluation of hypertension. The patient reports that she had a pre-op on Friday for an upcoming surgery and was told that her blood pressure was high and that she should recheck it again on Monday, which she did. When she was checking it today, she noticed it was high all day ranging from the 180's to the 200's. Patient takes atenolol for her hypertension. She states she drinks 2 cups of coffee a day, but it is not strong coffee. Of note, patient reports that she was told she had a-fib when she had her cataract surgery, but when she went to a different doctor after this she was told that she had a regular rhythm. Denies chest pain or difficulty breathing. Denies tobacco or other drug use.     Independent Historian:    None     Review of External Notes:  None    Medications:    Tenormin  Crestor    Past Medical History:    Dyslipidemia  Hypertension  Osteoarthritis    Past Surgical History:    Bilateral cataract extraction    Physical Exam   Patient Vitals for the past 24 hrs:   BP Temp Temp src Pulse Resp SpO2   12/04/23 2345 (!) 190/118 -- -- 65 14 96 %   12/04/23 2330 (!) 183/109 -- -- 66 17 96 %   12/04/23 2315 (!) 183/106 -- -- 69 17 96 %   12/04/23 2300 (!) 200/106 -- -- 71 12 96 %   12/04/23 2230 (!) 211/107 -- -- 68 -- --   12/04/23 2221 (!) 194/120 -- -- -- -- --   12/04/23 2215 (!) 214/123 98  F (36.7  C) Oral 71 18 96 %      Physical Exam      HEENT:    Oropharynx is moist  Eyes:    Conjunctiva normal  Neck:     Supple, no meningismus.     CV:     Regular rate and rhythm.      No murmurs, rubs or gallops.       No unilateral leg swelling.       2+ radial pulses bilateral.       No lower extremity edema.  PULM:    Clear to auscultation bilateral.       No respiratory distress.      Good air  exchange.     No rales or wheezing.     No stridor.  ABD:    Soft, non-tender, non-distended.       No pulsatile masses.       No rebound, guarding or rigidity.  MSK:     No gross deformity to all four extremities.   LYMPH:   No cervical lymphadenopathy.  NEURO:   Alert. Good muscle tone, no atrophy.  Skin:    Warm, dry and intact.    Psych:    Mood is good and affect is appropriate.      Emergency Department Course   ECG  ECG taken at 2310, ECG read at 2330  Sinus rhythm with marked sinus arrhythmia.  Left axis deviation.  Minimal voltage criteria for LVH, may be normal variant (Irineo product).   Rate 69 bpm. NE interval 202 ms. QRS duration 106 ms. QT/QTc 448/480 ms. P-R-T axes 59 -39 11.    Laboratory:  Labs Ordered and Resulted from Time of ED Arrival to Time of ED Departure   BASIC METABOLIC PANEL - Abnormal       Result Value    Sodium 139      Potassium 3.6      Chloride 102      Carbon Dioxide (CO2) 28      Anion Gap 9      Urea Nitrogen 23.8 (*)     Creatinine 0.81      GFR Estimate 73      Calcium 9.0      Glucose 100 (*)    CBC WITH PLATELETS AND DIFFERENTIAL    WBC Count 6.0      RBC Count 4.23      Hemoglobin 13.0      Hematocrit 39.5      MCV 93      MCH 30.7      MCHC 32.9      RDW 13.2      Platelet Count 197      % Neutrophils 68      % Lymphocytes 20      % Monocytes 10      % Eosinophils 1      % Basophils 1      % Immature Granulocytes 0      NRBCs per 100 WBC 0      Absolute Neutrophils 4.1      Absolute Lymphocytes 1.2      Absolute Monocytes 0.6      Absolute Eosinophils 0.1      Absolute Basophils 0.0      Absolute Immature Granulocytes 0.0      Absolute NRBCs 0.0        Emergency Department Course & Assessments:     Interventions:  Medications   lisinopril (ZESTRIL) tablet 10 mg (10 mg Oral $Given 12/4/23 2322)      Assessments/Consultations/Discussion of Management or Tests:  ED Course as of 12/04/23 2355   Mon Dec 04, 2023   7157 I obtained history and examined the patient as noted  above.      Social Determinants of Health affecting care:  None      Disposition:  The patient was discharged to home.     Impression & Plan    Medical Decision Makin-year-old female presents with asymptomatic hypertension.  She has no evidence of endorgan damage.  EKG nonischemic although with sinus arrhythmia.  Patient currently on 100 mg atenolol daily.  I will add on lisinopril 10 mg.  Patient given first dose in ED.  Patient made aware that she needs to follow-up with her primary care physician for ongoing management of her hypertension and may need escalating dose of lisinopril if blood pressures remain elevated.    Diagnosis:    ICD-10-CM    1. Essential hypertension  I10          Discharge Medications:  New Prescriptions    LISINOPRIL (ZESTRIL) 10 MG TABLET    Take 1 tablet (10 mg) by mouth daily for 30 days      Scribe Disclosure:  LOKI SPRING, am serving as a scribe at 11:19 PM on 2023 to document services personally performed by Romel Moulton MD based on my observations and the provider's statements to me.    2023   Romel Moulton MD Matthews, Jeremiah R, MD  23 0437

## 2023-12-05 NOTE — ED NOTES
12/04/23 2345   Vital Signs   BP (!) 190/118   BP - Mean 159   Pulse 65   Oximeter Heart Rate 68 bpm   Resp 14   SpO2 96 %   O2 Device None (Room air)     Dr. Moulton made aware of BP reading. Pt denies CP, SOB, nausea, headache or blurry vision.   Dr. Moulton aware and ok for discharge.

## 2023-12-27 ENCOUNTER — HOSPITAL ENCOUNTER (INPATIENT)
Facility: CLINIC | Age: 78
LOS: 7 days | Discharge: NURSING, REHAB OR LONG-TERM CARE | DRG: 480 | End: 2024-01-03
Attending: EMERGENCY MEDICINE | Admitting: INTERNAL MEDICINE
Payer: COMMERCIAL

## 2023-12-27 ENCOUNTER — APPOINTMENT (OUTPATIENT)
Dept: CT IMAGING | Facility: CLINIC | Age: 78
DRG: 480 | End: 2023-12-27
Attending: STUDENT IN AN ORGANIZED HEALTH CARE EDUCATION/TRAINING PROGRAM
Payer: COMMERCIAL

## 2023-12-27 ENCOUNTER — APPOINTMENT (OUTPATIENT)
Dept: GENERAL RADIOLOGY | Facility: CLINIC | Age: 78
DRG: 480 | End: 2023-12-27
Attending: EMERGENCY MEDICINE
Payer: COMMERCIAL

## 2023-12-27 DIAGNOSIS — Z96.651 S/P TKR (TOTAL KNEE REPLACEMENT), RIGHT: ICD-10-CM

## 2023-12-27 DIAGNOSIS — S72.401A CLOSED FRACTURE OF DISTAL END OF RIGHT FEMUR, UNSPECIFIED FRACTURE MORPHOLOGY, INITIAL ENCOUNTER (H): ICD-10-CM

## 2023-12-27 LAB
ANION GAP SERPL CALCULATED.3IONS-SCNC: 10 MMOL/L (ref 7–15)
APTT PPP: 30 SECONDS (ref 22–38)
BASOPHILS # BLD AUTO: 0 10E3/UL (ref 0–0.2)
BASOPHILS NFR BLD AUTO: 0 %
BUN SERPL-MCNC: 13.7 MG/DL (ref 8–23)
CALCIUM SERPL-MCNC: 8.8 MG/DL (ref 8.8–10.2)
CHLORIDE SERPL-SCNC: 95 MMOL/L (ref 98–107)
CREAT SERPL-MCNC: 0.64 MG/DL (ref 0.51–0.95)
DEPRECATED HCO3 PLAS-SCNC: 27 MMOL/L (ref 22–29)
EGFRCR SERPLBLD CKD-EPI 2021: 89 ML/MIN/1.73M2
EOSINOPHIL # BLD AUTO: 0 10E3/UL (ref 0–0.7)
EOSINOPHIL NFR BLD AUTO: 0 %
ERYTHROCYTE [DISTWIDTH] IN BLOOD BY AUTOMATED COUNT: 13.9 % (ref 10–15)
GLUCOSE SERPL-MCNC: 108 MG/DL (ref 70–99)
HCT VFR BLD AUTO: 32.2 % (ref 35–47)
HGB BLD-MCNC: 10.6 G/DL (ref 11.7–15.7)
IMM GRANULOCYTES # BLD: 0.1 10E3/UL
IMM GRANULOCYTES NFR BLD: 1 %
INR PPP: 1.09 (ref 0.85–1.15)
LYMPHOCYTES # BLD AUTO: 0.6 10E3/UL (ref 0.8–5.3)
LYMPHOCYTES NFR BLD AUTO: 6 %
MCH RBC QN AUTO: 30.6 PG (ref 26.5–33)
MCHC RBC AUTO-ENTMCNC: 32.9 G/DL (ref 31.5–36.5)
MCV RBC AUTO: 93 FL (ref 78–100)
MONOCYTES # BLD AUTO: 0.5 10E3/UL (ref 0–1.3)
MONOCYTES NFR BLD AUTO: 5 %
NEUTROPHILS # BLD AUTO: 9.4 10E3/UL (ref 1.6–8.3)
NEUTROPHILS NFR BLD AUTO: 88 %
NRBC # BLD AUTO: 0 10E3/UL
NRBC BLD AUTO-RTO: 0 /100
PLATELET # BLD AUTO: 256 10E3/UL (ref 150–450)
POTASSIUM SERPL-SCNC: 3 MMOL/L (ref 3.4–5.3)
RBC # BLD AUTO: 3.46 10E6/UL (ref 3.8–5.2)
SODIUM SERPL-SCNC: 132 MMOL/L (ref 135–145)
VIT D+METAB SERPL-MCNC: 41 NG/ML (ref 20–50)
WBC # BLD AUTO: 10.6 10E3/UL (ref 4–11)

## 2023-12-27 PROCEDURE — 85025 COMPLETE CBC W/AUTO DIFF WBC: CPT | Performed by: EMERGENCY MEDICINE

## 2023-12-27 PROCEDURE — 96374 THER/PROPH/DIAG INJ IV PUSH: CPT | Mod: 59

## 2023-12-27 PROCEDURE — 36415 COLL VENOUS BLD VENIPUNCTURE: CPT | Performed by: EMERGENCY MEDICINE

## 2023-12-27 PROCEDURE — 250N000011 HC RX IP 250 OP 636: Performed by: EMERGENCY MEDICINE

## 2023-12-27 PROCEDURE — 250N000013 HC RX MED GY IP 250 OP 250 PS 637: Performed by: INTERNAL MEDICINE

## 2023-12-27 PROCEDURE — 82306 VITAMIN D 25 HYDROXY: CPT | Performed by: STUDENT IN AN ORGANIZED HEALTH CARE EDUCATION/TRAINING PROGRAM

## 2023-12-27 PROCEDURE — 73552 X-RAY EXAM OF FEMUR 2/>: CPT | Mod: RT

## 2023-12-27 PROCEDURE — 73560 X-RAY EXAM OF KNEE 1 OR 2: CPT | Mod: RT

## 2023-12-27 PROCEDURE — 258N000003 HC RX IP 258 OP 636: Performed by: INTERNAL MEDICINE

## 2023-12-27 PROCEDURE — 250N000011 HC RX IP 250 OP 636: Performed by: INTERNAL MEDICINE

## 2023-12-27 PROCEDURE — 85610 PROTHROMBIN TIME: CPT | Performed by: EMERGENCY MEDICINE

## 2023-12-27 PROCEDURE — 99222 1ST HOSP IP/OBS MODERATE 55: CPT | Performed by: INTERNAL MEDICINE

## 2023-12-27 PROCEDURE — 80048 BASIC METABOLIC PNL TOTAL CA: CPT | Performed by: EMERGENCY MEDICINE

## 2023-12-27 PROCEDURE — 120N000001 HC R&B MED SURG/OB

## 2023-12-27 PROCEDURE — 85730 THROMBOPLASTIN TIME PARTIAL: CPT | Performed by: EMERGENCY MEDICINE

## 2023-12-27 PROCEDURE — 99285 EMERGENCY DEPT VISIT HI MDM: CPT | Mod: 25

## 2023-12-27 PROCEDURE — 73700 CT LOWER EXTREMITY W/O DYE: CPT | Mod: RT

## 2023-12-27 RX ORDER — HYDROMORPHONE HCL IN WATER/PF 6 MG/30 ML
0.4 PATIENT CONTROLLED ANALGESIA SYRINGE INTRAVENOUS
Status: DISCONTINUED | OUTPATIENT
Start: 2023-12-27 | End: 2023-12-28

## 2023-12-27 RX ORDER — ROSUVASTATIN CALCIUM 10 MG/1
20 TABLET, COATED ORAL DAILY
Status: DISCONTINUED | OUTPATIENT
Start: 2023-12-27 | End: 2023-12-27

## 2023-12-27 RX ORDER — MORPHINE SULFATE 4 MG/ML
4 INJECTION, SOLUTION INTRAMUSCULAR; INTRAVENOUS ONCE
Status: COMPLETED | OUTPATIENT
Start: 2023-12-27 | End: 2023-12-27

## 2023-12-27 RX ORDER — ACETAMINOPHEN 325 MG/1
975 TABLET ORAL 3 TIMES DAILY
Status: DISCONTINUED | OUTPATIENT
Start: 2023-12-27 | End: 2023-12-28

## 2023-12-27 RX ORDER — ONDANSETRON 4 MG/1
4 TABLET, ORALLY DISINTEGRATING ORAL EVERY 6 HOURS PRN
Status: DISCONTINUED | OUTPATIENT
Start: 2023-12-27 | End: 2024-01-03 | Stop reason: HOSPADM

## 2023-12-27 RX ORDER — ROSUVASTATIN CALCIUM 20 MG/1
20 TABLET, COATED ORAL DAILY
Status: DISCONTINUED | OUTPATIENT
Start: 2023-12-27 | End: 2024-01-03 | Stop reason: HOSPADM

## 2023-12-27 RX ORDER — ATENOLOL 50 MG/1
100 TABLET ORAL DAILY
Status: DISCONTINUED | OUTPATIENT
Start: 2023-12-27 | End: 2024-01-03 | Stop reason: HOSPADM

## 2023-12-27 RX ORDER — SODIUM CHLORIDE 9 MG/ML
INJECTION, SOLUTION INTRAVENOUS CONTINUOUS
Status: DISCONTINUED | OUTPATIENT
Start: 2023-12-27 | End: 2023-12-28

## 2023-12-27 RX ORDER — AMOXICILLIN 250 MG
2 CAPSULE ORAL 2 TIMES DAILY PRN
Status: DISCONTINUED | OUTPATIENT
Start: 2023-12-27 | End: 2023-12-28

## 2023-12-27 RX ORDER — CLOBETASOL PROPIONATE 0.5 MG/G
OINTMENT TOPICAL
COMMUNITY

## 2023-12-27 RX ORDER — ACETAMINOPHEN 500 MG
1000 TABLET ORAL EVERY 8 HOURS PRN
Status: ON HOLD | COMMUNITY
End: 2024-01-01

## 2023-12-27 RX ORDER — AMOXICILLIN 250 MG
1-2 CAPSULE ORAL 2 TIMES DAILY PRN
Status: ON HOLD | COMMUNITY
End: 2024-01-01

## 2023-12-27 RX ORDER — BISACODYL 10 MG
10 SUPPOSITORY, RECTAL RECTAL DAILY PRN
Status: DISCONTINUED | OUTPATIENT
Start: 2023-12-27 | End: 2023-12-28

## 2023-12-27 RX ORDER — METHOCARBAMOL 500 MG/1
500 TABLET, FILM COATED ORAL 3 TIMES DAILY PRN
Status: DISCONTINUED | OUTPATIENT
Start: 2023-12-27 | End: 2024-01-03 | Stop reason: HOSPADM

## 2023-12-27 RX ORDER — HYDROMORPHONE HCL IN WATER/PF 6 MG/30 ML
0.2 PATIENT CONTROLLED ANALGESIA SYRINGE INTRAVENOUS
Status: DISCONTINUED | OUTPATIENT
Start: 2023-12-27 | End: 2023-12-28

## 2023-12-27 RX ORDER — HYDRALAZINE HYDROCHLORIDE 20 MG/ML
10 INJECTION INTRAMUSCULAR; INTRAVENOUS EVERY 4 HOURS PRN
Status: DISCONTINUED | OUTPATIENT
Start: 2023-12-27 | End: 2024-01-03 | Stop reason: HOSPADM

## 2023-12-27 RX ORDER — ASPIRIN 325 MG
325 TABLET, DELAYED RELEASE (ENTERIC COATED) ORAL 2 TIMES DAILY
Status: ON HOLD | COMMUNITY
End: 2024-01-03

## 2023-12-27 RX ORDER — OXYCODONE HYDROCHLORIDE 5 MG/1
5 CAPSULE ORAL EVERY 4 HOURS PRN
Status: ON HOLD | COMMUNITY
End: 2024-01-03

## 2023-12-27 RX ORDER — CALCIUM CARBONATE 500 MG/1
1000 TABLET, CHEWABLE ORAL 4 TIMES DAILY PRN
Status: DISCONTINUED | OUTPATIENT
Start: 2023-12-27 | End: 2024-01-03 | Stop reason: HOSPADM

## 2023-12-27 RX ORDER — ONDANSETRON 2 MG/ML
4 INJECTION INTRAMUSCULAR; INTRAVENOUS EVERY 6 HOURS PRN
Status: DISCONTINUED | OUTPATIENT
Start: 2023-12-27 | End: 2024-01-03 | Stop reason: HOSPADM

## 2023-12-27 RX ORDER — AMOXICILLIN 250 MG
1 CAPSULE ORAL 2 TIMES DAILY PRN
Status: DISCONTINUED | OUTPATIENT
Start: 2023-12-27 | End: 2023-12-28

## 2023-12-27 RX ORDER — IBUPROFEN 600 MG/1
600 TABLET, FILM COATED ORAL EVERY 8 HOURS PRN
Status: ON HOLD | COMMUNITY
End: 2024-01-03

## 2023-12-27 RX ORDER — HYDROXYZINE HYDROCHLORIDE 25 MG/1
25 TABLET, FILM COATED ORAL EVERY 6 HOURS PRN
Status: DISCONTINUED | OUTPATIENT
Start: 2023-12-27 | End: 2024-01-03 | Stop reason: HOSPADM

## 2023-12-27 RX ORDER — HYDROMORPHONE HYDROCHLORIDE 1 MG/ML
0.5 INJECTION, SOLUTION INTRAMUSCULAR; INTRAVENOUS; SUBCUTANEOUS ONCE
Status: COMPLETED | OUTPATIENT
Start: 2023-12-27 | End: 2023-12-27

## 2023-12-27 RX ORDER — LIDOCAINE 40 MG/G
CREAM TOPICAL
Status: DISCONTINUED | OUTPATIENT
Start: 2023-12-27 | End: 2023-12-28

## 2023-12-27 RX ORDER — HYDRALAZINE HYDROCHLORIDE 10 MG/1
10 TABLET, FILM COATED ORAL EVERY 4 HOURS PRN
Status: DISCONTINUED | OUTPATIENT
Start: 2023-12-27 | End: 2024-01-03 | Stop reason: HOSPADM

## 2023-12-27 RX ORDER — HYDROMORPHONE HYDROCHLORIDE 2 MG/1
2 TABLET ORAL EVERY 4 HOURS PRN
Status: DISCONTINUED | OUTPATIENT
Start: 2023-12-27 | End: 2023-12-28

## 2023-12-27 RX ORDER — PREDNISOLONE ACETATE 10 MG/ML
1 SUSPENSION/ DROPS OPHTHALMIC 4 TIMES DAILY
COMMUNITY

## 2023-12-27 RX ORDER — MORPHINE SULFATE 4 MG/ML
4 INJECTION, SOLUTION INTRAMUSCULAR; INTRAVENOUS
Status: DISCONTINUED | OUTPATIENT
Start: 2023-12-27 | End: 2023-12-27

## 2023-12-27 RX ORDER — HYDROXYZINE HYDROCHLORIDE 10 MG/1
10 TABLET, FILM COATED ORAL EVERY 8 HOURS PRN
COMMUNITY
End: 2024-01-04

## 2023-12-27 RX ADMIN — ACETAMINOPHEN 975 MG: 325 TABLET, FILM COATED ORAL at 19:49

## 2023-12-27 RX ADMIN — HYDROMORPHONE HYDROCHLORIDE 0.5 MG: 1 INJECTION, SOLUTION INTRAMUSCULAR; INTRAVENOUS; SUBCUTANEOUS at 15:27

## 2023-12-27 RX ADMIN — HYDROMORPHONE HYDROCHLORIDE 0.2 MG: 0.2 INJECTION, SOLUTION INTRAMUSCULAR; INTRAVENOUS; SUBCUTANEOUS at 19:48

## 2023-12-27 RX ADMIN — ATENOLOL 100 MG: 50 TABLET ORAL at 19:49

## 2023-12-27 RX ADMIN — SODIUM CHLORIDE: 9 INJECTION, SOLUTION INTRAVENOUS at 15:28

## 2023-12-27 RX ADMIN — ACETAMINOPHEN 975 MG: 325 TABLET, FILM COATED ORAL at 15:31

## 2023-12-27 RX ADMIN — ROSUVASTATIN CALCIUM 20 MG: 20 TABLET, FILM COATED ORAL at 19:49

## 2023-12-27 RX ADMIN — MORPHINE SULFATE 4 MG: 4 INJECTION, SOLUTION INTRAMUSCULAR; INTRAVENOUS at 14:25

## 2023-12-27 RX ADMIN — HYDROMORPHONE HYDROCHLORIDE 0.2 MG: 0.2 INJECTION, SOLUTION INTRAMUSCULAR; INTRAVENOUS; SUBCUTANEOUS at 22:48

## 2023-12-27 ASSESSMENT — ACTIVITIES OF DAILY LIVING (ADL)
ADLS_ACUITY_SCORE: 41
ADLS_ACUITY_SCORE: 37
ADLS_ACUITY_SCORE: 41
ADLS_ACUITY_SCORE: 37

## 2023-12-27 NOTE — ED NOTES
[facility]  ED Nurse Handoff Report    ED Chief complaint: Fall and Knee Pain  . ED Diagnosis:   Final diagnoses:   None       Allergies:   Allergies   Allergen Reactions    Iodine-131 Angioedema, Hives and Itching       Code Status: Full Code    Activity level - Baseline/Home:  independent.  Activity Level - Current:   in bed.   Lift room needed: No.   Bariatric: No   Needed: No   Isolation: No.   Infection: Not Applicable.     Respiratory status: Room air    Vital Signs (within 30 minutes):   Vitals:    12/27/23 1300 12/27/23 1351 12/27/23 1355 12/27/23 1434   BP:  (!) 154/85  120/63   Pulse:  58     Resp:       Temp:       TempSrc:       SpO2: 96% 100% 98% 99%       Cardiac Rhythm:  ,      Pain level:    Patient confused: No.   Patient Falls Risk: nonskid shoes/slippers when out of bed, arm band in place, and patient and family education.   Elimination Status:  has not voided yet      Patient Report - Initial Complaint: fall, knee pain.   Focused Assessment: Fall and Knee Pain     The history is provided by the patient.      Mercedes Nelson is a 78 year old female S/P right knee replacement with a history of dyslipidemia and hypertension who presents to the emergency department for fall and knee pain. The patient states that this morning she slipped down her stairs and her right leg bent underneath her body, as she fell. She reports that she had a right knee replacement 3 weeks ago. She notes that she was unable to weightbear after the fall but above her right knee, there is an indentation which is new. She adds that she is also experiencing erythema to her lateral right knee, that she landed on when she fell. Denies dizziness, palpitations, shortness of breath, or chest pain before her fall. Denies new numbness, tingling, weakness. Denies pain in lower right leg. Denies left leg pain, back pain, neck pain, or head pain. She adds that she is on aspirin.     Abnormal Results:    Labs Ordered and Resulted from Time of ED Arrival to Time of ED Departure - No data to display     XR Femur Right 2 Views   Final Result   Impression:      1.  Acute oblique fracture of the distal femoral metadiaphysis,   moderately displaced, with minimal comminution. The cemented femoral   components are well seated without evidence of loosening. Joint   alignment remains normal. Large knee joint effusion.      2.  No additional femoral fracture. Normal right hip joint alignment   with maintained joint spacing.      BRANDON HOYT MD            SYSTEM ID:  [id number]      XR Knee Right 1/2 Views   Final Result   Impression:      1.  Acute oblique fracture of the distal femoral metadiaphysis,   moderately displaced, with minimal comminution. The cemented femoral   components are well seated without evidence of loosening. Joint   alignment remains normal. Large knee joint effusion.      2.  No additional femoral fracture. Normal right hip joint alignment   with maintained joint spacing.      BRANDON HOYT MD            SYSTEM ID:  [id number]      CT Femur Right w/o Contrast    (Results Pending)       Treatments provided: see MAR, imaging, labs   Family Comments:  at bedside  OBS brochure/video discussed/provided to patient:  Yes  ED Medications:   Medications   morphine (PF) injection 4 mg (4 mg Intravenous $Given 12/27/23 9839)       Drips infusing:  No  For the majority of the shift this patient was Green.   Interventions performed were n/a.    Sepsis treatment initiated: No    Cares/treatment/interventions/medications to be completed following ED care:     ED Nurse Name: Rachana Smith RN  2:35 PM

## 2023-12-27 NOTE — CONSULTS
[facility]    Orthopedic Consultation    Mercedes Nelson MRN# [id number]   Age: 78 year old YOB: 1945     Date of Admission:  12/27/2023    Reason for consult: Distal femur fracture, right        Requesting physician:     Harry Mcdonald DO          Level of consult: Consult, follow and place orders           Assessment and Plan:   Assessment:   Right distal femur fracture s/p TKA 3 11/30/2023 (3 weeks)         Plan:   The patient's history and clinical/diagnostic findings were reviewed with the on-call orthopedic trauma surgeon. The patient is a candidate for ORIF with retrograde nail. This will be scheduled for tomorrow, 12/28, pending surgical optimization by hospital team. Dr. Hal Kimball will discuss the risks, benefits, and outcomes of surgery while obtaining consent.       NPO at midnight.  NWB/bedrest until postop.  Continue pain regimen.  Muscle relaxant available for PRN use.  Anticoagulation: hold if any   Vitamin D deficiency lab  ordered.  Type and screen ordered.    Please contact orthopedic trauma team if any questions or concerns arise.           Chief Complaint:   Left distal femur fracture          History of Present Illness:   Mecredes Nelson is a 78 year old female who has a past medical history significant for hypertension, hyperlipidemia, osteoarthritis, and recent right total knee arthroplasty.  She slipped and fell down a few stairs earlier today.  Her right leg bent underneath her during the fall.  Immediately having right leg pain after fall.  Presented to emergency room for further evaluation.  Still having some right knee pain.  Pain medications have helped in the ER.  No other acute complaints. Imaging revealed distal femur fracture s/p TKA.           Past Medical History:     Past Medical History:   Diagnosis Date    Dyslipidemia     Hypertension     Osteoarthritis              Past Surgical History:     Past Surgical History:   Procedure  Laterality Date    CATARACT EXTRACTION, BILATERAL  2023             Social History:     Social History     Tobacco Use    Smoking status: Never    Smokeless tobacco: Never   Substance Use Topics    Alcohol use: No             Family History:     Family History   Problem Relation Age of Onset    Breast Cancer Mother 80    Myocardial Infarction Father 57         @ 63 from MI             Immunizations:     VACCINE/DOSE   Diptheria   DPT   DTAP   HBIG   Hepatitis A   Hepatitis B   HIB   Influenza   Measles   Meningococcal   MMR   Mumps   Pneumococcal   Polio   Rubella   Small Pox   TDAP   Varicella   Zoster             Allergies:     Allergies   Allergen Reactions    Iodine-131 Angioedema, Hives and Itching             Medications:     Current Facility-Administered Medications   Medication    acetaminophen (TYLENOL) tablet 975 mg    atenolol (TENORMIN) tablet 100 mg    bisacodyl (DULCOLAX) suppository 10 mg    calcium carbonate (TUMS) chewable tablet 1,000 mg    hydrALAZINE (APRESOLINE) tablet 10 mg    Or    hydrALAZINE (APRESOLINE) injection 10 mg    HYDROmorphone (DILAUDID) half-tab 1 mg    HYDROmorphone (DILAUDID) injection 0.2 mg    HYDROmorphone (DILAUDID) injection 0.4 mg    HYDROmorphone (DILAUDID) tablet 2 mg    HYDROmorphone (PF) (DILAUDID) injection 0.5 mg    hydrOXYzine HCl (ATARAX) tablet 25 mg    lidocaine (LMX4) cream    lidocaine 1 % 0.1-1 mL    melatonin tablet 1 mg    ondansetron (ZOFRAN ODT) ODT tab 4 mg    Or    ondansetron (ZOFRAN) injection 4 mg    rosuvastatin (CRESTOR) tablet 20 mg    senna-docusate (SENOKOT-S/PERICOLACE) 8.6-50 MG per tablet 1 tablet    Or    senna-docusate (SENOKOT-S/PERICOLACE) 8.6-50 MG per tablet 2 tablet    sodium chloride (PF) 0.9% PF flush 3 mL    sodium chloride (PF) 0.9% PF flush 3 mL    sodium chloride 0.9 % infusion     Current Outpatient Medications   Medication Sig    atenolol (TENORMIN) 100 MG tablet Take 100 mg by mouth daily    B Complex Vitamins (VITAMIN   "B COMPLEX) tablet Take 1 tablet by mouth daily    Coenzyme Q10 (COQ-10 PO) Take 1 tablet by mouth daily    ELDERBERRY PO     lisinopril (ZESTRIL) 10 MG tablet Take 1 tablet (10 mg) by mouth daily for 30 days    Multiple Vitamin (MULTI-VITAMIN) per tablet Take 1 tablet by mouth daily    Red Yeast Rice Extract (RED YEAST RICE PO)     rosuvastatin (CRESTOR) 20 MG tablet Take 20 mg by mouth daily    VITAMIN D, CHOLECALCIFEROL, PO Take 1 capsule by mouth daily             Review of Systems:   CV: NEGATIVE for chest pain, palpitations or peripheral edema  C: NEGATIVE for fever, chills, change in weight  E/M: NEGATIVE for ear, mouth and throat problems  R: NEGATIVE for significant cough or SOB          Physical Exam:   All vitals have been reviewed  Patient Vitals for the past 24 hrs:   BP Temp Temp src Pulse Resp SpO2 Height Weight   12/27/23 1513 100/61 -- -- -- -- 96 % -- --   12/27/23 1512 -- -- -- -- -- -- 1.727 m (5' 8\") 79.4 kg (175 lb)   12/27/23 1438 -- -- -- -- -- 98 % -- --   12/27/23 1434 120/63 -- -- -- -- 99 % -- --   12/27/23 1355 -- -- -- -- -- 98 % -- --   12/27/23 1351 (!) 154/85 -- -- 58 -- 100 % -- --   12/27/23 1300 -- -- -- -- -- 96 % -- --   12/27/23 1225 (!) 163/104 -- -- 62 -- 96 % -- --   12/27/23 1220 (!) 163/104 -- -- -- -- 97 % -- --   12/27/23 1215 -- -- -- -- -- 96 % -- --   12/27/23 1200 -- -- -- -- -- 96 % -- --   12/27/23 1145 (!) 198/104 -- -- 62 -- 97 % -- --   12/27/23 1144 (!) 198/104 98  F (36.7  C) Oral 63 18 96 % -- --     No intake or output data in the 24 hours ending 12/27/23 1523    Constitutional: Pleasant, alert, appropriate, following commands.  HEENT: Head atraumatic normocephalic. Pupils equal round and reactive.  Respiratory: Unlabored breathing no audible wheeze    Musculoskeletal: Left lower extremity: within normal limits     Right lower extremity: Skin intact with no open wounds. Moderate swelling over distal femur fracture site. ROM deferred as known injury. RLE NVI. " SILT 1st DWS, plantar and dorsal aspect of foot. +DP pulse. Healing incision site from TKA, no dehiscence, or erythema some steri strips remain in place.   Neurologic: normal without focal findings, mental status, speech normal, alert and oriented x iii, LINO  Neuropsychiatric: Stable          Data:   All laboratory data reviewed  Results for orders placed or performed during the hospital encounter of 12/27/23   XR Femur Right 2 Views     Status: None    Narrative    Examination:  XR KNEE RIGHT 1/2 VIEWS, XR FEMUR RIGHT 2 VIEWS    Date:  12/27/2023 1:53 PM     Clinical Information: Right knee pain.    Comparison: none.      Impression    Impression:    1.  Acute oblique fracture of the distal femoral metadiaphysis,  moderately displaced, with minimal comminution. The cemented femoral  components are well seated without evidence of loosening. Joint  alignment remains normal. Large knee joint effusion.    2.  No additional femoral fracture. Normal right hip joint alignment  with maintained joint spacing.    BRANDON HOYT MD         SYSTEM ID:  [id number]   XR Knee Right 1/2 Views     Status: None    Narrative    Examination:  XR KNEE RIGHT 1/2 VIEWS, XR FEMUR RIGHT 2 VIEWS    Date:  12/27/2023 1:53 PM     Clinical Information: Right knee pain.    Comparison: none.      Impression    Impression:    1.  Acute oblique fracture of the distal femoral metadiaphysis,  moderately displaced, with minimal comminution. The cemented femoral  components are well seated without evidence of loosening. Joint  alignment remains normal. Large knee joint effusion.    2.  No additional femoral fracture. Normal right hip joint alignment  with maintained joint spacing.    BRANDON HOYT MD         SYSTEM ID:  [id number]   CT Femur Right w/o Contrast     Status: None    Narrative    CT RIGHT FEMUR WITHOUT CONTRAST 12/27/2023 2:47 PM    CLINICAL HISTORY: Right thigh pain, known distal femoral periprostatic  fracture.  Treatment planning exam.    TECHNIQUE: Multiple contiguous axial CT images were obtained of the  right femur without intravenous contrast. Data was reformatted into  soft tissue and bone algorithms, in coronal and sagittal planes. Dose  reduction techniques were used.    COMPARISON: Femur radiographs 12/27/2023.    FINDINGS:    Distal femoral fractures. There is a primary obliquely oriented  fracture of the distal femoral metadiaphysis. The distal fracture  component is displaced posterior and proximally relative to the  proximal component, by about 4 cm. There is mild fracture comminution,  with a few cortical fragments present medially and anteriorly.    There is a cemented right total knee arthroplasty. Fracture lines come  to the anterior margin of the cemented femoral component, but there is  no evidence that the component is lucent/ from the femoral  epiphysis.    The right knee joint remains normally aligned. The tibial component  and the patellar resurfacing component remain well seated without  complication.    There is a large knee joint effusion, and moderate soft tissue  swelling around the fracture. There is no drainable hematoma.    Bones are demineralized. No evidence of a discrete lytic or sclerotic  bone lesion.    Moderate degenerative arthritic joint space narrowing and spurring in  the right hip.    Mild generalized atrophy of the right thigh musculature.    Mild femoral artery atherosclerotic calcification.      Impression    IMPRESSION:    1.  Acute distal femoral fracture, a mildly comminuted fracture with  primary oblique orientation, involving the distal femoral  metadiaphysis. The fracture is displaced approximately 4 cm.    2.  Fracture lines come to the anterior margin of the cemented femoral  component for a right total knee arthroplasty. However, components  appear well-seated without evidence of loosening or other  complication.    3.  Moderate soft tissue edema/swelling  around the fracture. Large  knee joint effusion.    4.  Right total knee arthroplasty hardware is in place. As noted  above, components are well seated without loosening or other  complication. Right knee joint alignment remains normal.    5.  Bones are demineralized. No concerning bone lesion or evidence of  pathologic fracture.    6.  Moderate degenerative arthritic changes in the right hip. The  right hip joint remains normally aligned.    BRANDON HOYT MD         SYSTEM ID:  [id number]   Basic metabolic panel     Status: Abnormal   Result Value Ref Range    Sodium 132 (L) 135 - 145 mmol/L    Potassium 3.0 (L) 3.4 - 5.3 mmol/L    Chloride 95 (L) 98 - 107 mmol/L    Carbon Dioxide (CO2) 27 22 - 29 mmol/L    Anion Gap 10 7 - 15 mmol/L    Urea Nitrogen 13.7 8.0 - 23.0 mg/dL    Creatinine 0.64 0.51 - 0.95 mg/dL    GFR Estimate 89 >60 mL/min/1.73m2    Calcium 8.8 8.8 - 10.2 mg/dL    Glucose 108 (H) 70 - 99 mg/dL   INR     Status: Normal   Result Value Ref Range    INR 1.09 0.85 - 1.15   PTT     Status: Normal   Result Value Ref Range    aPTT 30 22 - 38 Seconds   CBC with platelets and differential     Status: Abnormal   Result Value Ref Range    WBC Count 10.6 4.0 - 11.0 10e3/uL    RBC Count 3.46 (L) 3.80 - 5.20 10e6/uL    Hemoglobin 10.6 (L) 11.7 - 15.7 g/dL    Hematocrit 32.2 (L) 35.0 - 47.0 %    MCV 93 78 - 100 fL    MCH 30.6 26.5 - 33.0 pg    MCHC 32.9 31.5 - 36.5 g/dL    RDW 13.9 10.0 - 15.0 %    Platelet Count 256 150 - 450 10e3/uL    % Neutrophils 88 %    % Lymphocytes 6 %    % Monocytes 5 %    % Eosinophils 0 %    % Basophils 0 %    % Immature Granulocytes 1 %    NRBCs per 100 WBC 0 <1 /100    Absolute Neutrophils 9.4 (H) 1.6 - 8.3 10e3/uL    Absolute Lymphocytes 0.6 (L) 0.8 - 5.3 10e3/uL    Absolute Monocytes 0.5 0.0 - 1.3 10e3/uL    Absolute Eosinophils 0.0 0.0 - 0.7 10e3/uL    Absolute Basophils 0.0 0.0 - 0.2 10e3/uL    Absolute Immature Granulocytes 0.1 <=0.4 10e3/uL    Absolute NRBCs 0.0 10e3/uL    CBC with platelets differential     Status: Abnormal    Narrative    The following orders were created for panel order CBC with platelets differential.  Procedure                               Abnormality         Status                     ---------                               -----------         ------                     CBC with platelets and d...[[id number]]  Abnormal            Final result                 Please view results for these tests on the individual orders.          Attestation:  I have reviewed today's vital signs, notes, medications, labs and imaging with Dr. Kimball.  Amount of time performed on this consult: 50 minutes.    Nicolasa Pete PA-C  [facility]

## 2023-12-27 NOTE — H&P
"[facility]    History and Physical - Hospitalist Service       Date of Admission:  12/27/2023    Assessment & Plan      Mercedes Nelson is a 78 year old female admitted on 12/27/2023. She has a past medical history significant for hypertension, hyperlipidemia, osteoarthritis, and recent total knee replacement.  She presented to the emergency room today with right knee pain after fall.  Found to have distal right femur fracture.    Distal right femur fracture.  Mechanical fall.  -Orthopedic surgery consult.  -N.p.o. after midnight.  -Pain medications as needed.    Hypertension.  -Restart atenolol.  -Hold lisinopril.  -IV hydralazine if needed.    Hyperlipidemia.  -Restart rosuvastatin.        Diet:  Regular diet.  N.p.o. at midnight.  DVT Prophylaxis: Pneumatic Compression Devices  Johnson Catheter: Not present  Lines: None     Cardiac Monitoring: None  Code Status:  Full code.    Clinically Significant Risk Factors Present on Admission                  # Hypertension: Home medication list includes antihypertensive(s)      # Overweight: Estimated body mass index is 26.46 kg/m  as calculated from the following:    Height as of 12/1/23: 1.778 m (5' 10\").    Weight as of 12/1/23: 83.6 kg (184 lb 6.4 oz).              Disposition Plan      Expected Discharge Date: 12/29/2023                  Harry Mcdonald DO  Hospitalist Service  [facility]  Securely message with [employer] (more info)  Text page via [facility] Paging/Directory     ______________________________________________________________________    Chief Complaint   Right knee pain after a fall.    History is obtained from the patient    History of Present Illness   Mercedes Nelson is a 78 year old female who has a past medical history significant for hypertension, hyperlipidemia, osteoarthritis, and recent right total knee arthroplasty.  She slipped and fell down a few stairs earlier today.  Her right leg bent underneath " her during the fall.  Immediately having right leg pain after fall.  Presented to emergency room for further evaluation.  Still having some right knee pain.  Pain medications have helped in the ER.  No other acute complaints.      Past Medical History    Past Medical History:   Diagnosis Date    Dyslipidemia     Hypertension     Osteoarthritis        Past Surgical History   Past Surgical History:   Procedure Laterality Date    CATARACT EXTRACTION, BILATERAL  04/2023       Prior to Admission Medications   Prior to Admission Medications   Prescriptions Last Dose Informant Patient Reported? Taking?   B Complex Vitamins (VITAMIN  B COMPLEX) tablet  Self Yes No   Sig: Take 1 tablet by mouth daily   Coenzyme Q10 (COQ-10 PO)  Self Yes No   Sig: Take 1 tablet by mouth daily   ELDERBERRY PO   Yes No   Multiple Vitamin (MULTI-VITAMIN) per tablet  Self Yes No   Sig: Take 1 tablet by mouth daily   Red Yeast Rice Extract (RED YEAST RICE PO)   Yes No   VITAMIN D, CHOLECALCIFEROL, PO  Self Yes No   Sig: Take 1 capsule by mouth daily   atenolol (TENORMIN) 100 MG tablet   Yes No   Sig: Take 100 mg by mouth daily   lisinopril (ZESTRIL) 10 MG tablet   No No   Sig: Take 1 tablet (10 mg) by mouth daily for 30 days   rosuvastatin (CRESTOR) 20 MG tablet   Yes No   Sig: Take 20 mg by mouth daily      Facility-Administered Medications: None        Allergies   Allergies   Allergen Reactions    Iodine-131 Angioedema, Hives and Itching        Physical Exam   Vital Signs: Temp: 98  F (36.7  C) Temp src: Oral BP: 120/63 Pulse: 58   Resp: 18 SpO2: 98 %      Weight: 0 lbs 0 oz    Gen:  NAD, A&Ox3.  Eyes:  PERRL, sclera anicteric.  OP:  MMM, no lesions.  Neck:  Supple.  CV:  Regular, no murmurs.  Lung:  CTA b/l, normal effort.  Ab:  +BS, soft.  Skin:  Warm, dry to touch.  No rash.  Ext:  No pitting edema LE b/l.      Medical Decision Making       65 MINUTES SPENT BY ME on the date of service doing chart review, history, exam, documentation &  further activities per the note.      Data     I have personally reviewed the following data over the past 24 hrs:    10.6  \   10.6 (L)   / 256     N/A N/A N/A /  N/A   N/A N/A N/A \     INR:  1.09 PTT:  30   D-dimer:  N/A Fibrinogen:  N/A       Imaging results reviewed over the past 24 hrs:   Recent Results (from the past 24 hour(s))   XR Knee Right 1/2 Views    Narrative    Examination:  XR KNEE RIGHT 1/2 VIEWS, XR FEMUR RIGHT 2 VIEWS    Date:  12/27/2023 1:53 PM     Clinical Information: Right knee pain.    Comparison: none.      Impression    Impression:    1.  Acute oblique fracture of the distal femoral metadiaphysis,  moderately displaced, with minimal comminution. The cemented femoral  components are well seated without evidence of loosening. Joint  alignment remains normal. Large knee joint effusion.    2.  No additional femoral fracture. Normal right hip joint alignment  with maintained joint spacing.    BRANDON HOYT MD         SYSTEM ID:  [id number]   XR Femur Right 2 Views    Narrative    Examination:  XR KNEE RIGHT 1/2 VIEWS, XR FEMUR RIGHT 2 VIEWS    Date:  12/27/2023 1:53 PM     Clinical Information: Right knee pain.    Comparison: none.      Impression    Impression:    1.  Acute oblique fracture of the distal femoral metadiaphysis,  moderately displaced, with minimal comminution. The cemented femoral  components are well seated without evidence of loosening. Joint  alignment remains normal. Large knee joint effusion.    2.  No additional femoral fracture. Normal right hip joint alignment  with maintained joint spacing.    BRANDON HOYT MD         SYSTEM ID:  [id number]   CT Femur Right w/o Contrast    Narrative    CT RIGHT FEMUR WITHOUT CONTRAST 12/27/2023 2:47 PM    CLINICAL HISTORY: Right thigh pain, known distal femoral periprostatic  fracture. Treatment planning exam.    TECHNIQUE: Multiple contiguous axial CT images were obtained of the  right femur without intravenous  contrast. Data was reformatted into  soft tissue and bone algorithms, in coronal and sagittal planes. Dose  reduction techniques were used.    COMPARISON: Femur radiographs 12/27/2023.    FINDINGS:    Distal femoral fractures. There is a primary obliquely oriented  fracture of the distal femoral metadiaphysis. The distal fracture  component is displaced posterior and proximally relative to the  proximal component, by about 4 cm. There is mild fracture comminution,  with a few cortical fragments present medially and anteriorly.    There is a cemented right total knee arthroplasty. Fracture lines come  to the anterior margin of the cemented femoral component, but there is  no evidence that the component is lucent/ from the femoral  epiphysis.    The right knee joint remains normally aligned. The tibial component  and the patellar resurfacing component remain well seated without  complication.    There is a large knee joint effusion, and moderate soft tissue  swelling around the fracture. There is no drainable hematoma.    Bones are demineralized. No evidence of a discrete lytic or sclerotic  bone lesion.    Moderate degenerative arthritic joint space narrowing and spurring in  the right hip.    Mild generalized atrophy of the right thigh musculature.    Mild femoral artery atherosclerotic calcification.      Impression    IMPRESSION:    1.  Acute distal femoral fracture, a mildly comminuted fracture with  primary oblique orientation, involving the distal femoral  metadiaphysis. The fracture is displaced approximately 4 cm.    2.  Fracture lines come to the anterior margin of the cemented femoral  component for a right total knee arthroplasty. However, components  appear well-seated without evidence of loosening or other  complication.    3.  Moderate soft tissue edema/swelling around the fracture. Large  knee joint effusion.    4.  Right total knee arthroplasty hardware is in place. As noted  above,  components are well seated without loosening or other  complication. Right knee joint alignment remains normal.    5.  Bones are demineralized. No concerning bone lesion or evidence of  pathologic fracture.    6.  Moderate degenerative arthritic changes in the right hip. The  right hip joint remains normally aligned.    BRANDON HOYT MD         SYSTEM ID:  [id number]

## 2023-12-27 NOTE — ED PROVIDER NOTES
History     Chief Complaint:  Fall and Knee Pain     The history is provided by the patient.      Mercedes Nelson is a 78 year old female S/P right knee replacement with a history of dyslipidemia and hypertension who presents to the emergency department for fall and knee pain. The patient states that this morning she slipped down her stairs and her right leg bent underneath her body, as she fell. She reports that she had a right knee replacement 3 weeks ago. She notes that she was unable to weightbear after the fall but above her right knee, there is an indentation which is new. She adds that she is also experiencing erythema to her lateral right knee, that she landed on when she fell. Denies dizziness, palpitations, shortness of breath, or chest pain before her fall. Denies new numbness, tingling, weakness. Denies pain in lower right leg. Denies left leg pain, back pain, neck pain, or head pain. She adds that she is on aspirin.    Independent Historian:   None - Patient Only    Review of External Notes:   Outpatient clinic notes reviewed.  Unable to see recent surgical note for TKR.    Medications:    Atenolol  Lisinopril  Rosuvastatin    Past Medical History:    Dyslipidemia  Hypertension  Osteoarthritis    Past Surgical History:    Cataract extraction with IOL  R total knee replacement     Physical Exam   Patient Vitals for the past 24 hrs:   BP Temp Temp src Pulse Resp SpO2   12/27/23 1144 (!) 198/104 98  F (36.7  C) Oral 63 18 96 %      Physical Exam  General: Elderly female sitting upright  Eyes: PERRL, Conjunctive within normal limits  ENT: Moist mucous membranes, oropharynx clear.   CV: Normal S1S2, no murmur, rub or gallop. Regular rate and rhythm.  DP pulse palpable in right foot  Resp: Normal respiratory effort.  GI: Abdomen is soft, nontender and nondistended.   MSK: Multiple fullness of the right distal femur with a small pression in the skin just proximal to the right knee.  Tender over the area  just proximal to the right anterior knee.  No palpable crepitus.  Unable to range at the knee of the right lower extremity.  Skin: Warm and dry.  Erythematous/ecchymotic area noted just lateral to the right knee.  Surgical wound site is intact and well-healed with no surrounding erythema or ecchymoses.  Neuro: Alert and oriented. Responds appropriately to all questions and commands. No focal findings appreciated. Normal muscle tone.  Sensation intact to light touch over all dermatomes of the right lower extremity.  Psych: Normal mood and affect. Pleasant.     Emergency Department Course   Imaging:  XR Femur Right 2 Views   Final Result   Impression:      1.  Acute oblique fracture of the distal femoral metadiaphysis,   moderately displaced, with minimal comminution. The cemented femoral   components are well seated without evidence of loosening. Joint   alignment remains normal. Large knee joint effusion.      2.  No additional femoral fracture. Normal right hip joint alignment   with maintained joint spacing.      BRANDON HOYT MD            SYSTEM ID:  [id number]      XR Knee Right 1/2 Views   Final Result   Impression:      1.  Acute oblique fracture of the distal femoral metadiaphysis,   moderately displaced, with minimal comminution. The cemented femoral   components are well seated without evidence of loosening. Joint   alignment remains normal. Large knee joint effusion.      2.  No additional femoral fracture. Normal right hip joint alignment   with maintained joint spacing.      BRANDON HOYT MD            SYSTEM ID:  [id number]      CT Femur Right w/o Contrast    (Results Pending)          Laboratory:  CBC, BMP, INR, PTT pending      Emergency Department Course & Assessments:     Interventions:  Medications   morphine (PF) injection 4 mg (4 mg Intravenous $Given 12/27/23 5781)        Assessments:  1210 I obtained history and examined the patient as noted above.   I reassessed the patient.   Discussed findings of today's evaluation.  I discussed recommendation for admission.    Independent Interpretation (X-rays, CTs, rhythm strip):  I reviewed the patient's x-ray.  There is a displaced distal femur fracture appreciated.  Hardware  appears to be intact in the right knee.    Consultations/Discussion of Management or Tests:  I consulted with orthopedics Claire ALVARADO, who will alert the attending of [facility] orthopedics to the patient's injury.   I discussed the patient with Dr. Mcdonald of the hospitalist service who accepted her for admission.    Social Determinants of Health affecting care:   None    Disposition:  The patient was admitted to the hospital under the care of Dr. Mcdonald.     Impression & Plan      Medical Decision Making:  Mercedes Nelson is an 78-year-old female approximately 1 week status post right knee replacement presents emergency department after mechanical fall sustaining injury to her right leg with no other injuries reported.  Pain was controlled in the ED.  She did not hit her head or lose consciousness.  She denies headache.  She is comfortable appearing with her knee held in slight flexion.  She has evidence on x-ray of a distal femur fracture of the recently surgical knee.  She is neurovascular intact.  Orthopedics was consulted and are aware of the patient.  She will be admitted to the hospitalist service with orthopedics consulting.    Diagnosis:    ICD-10-CM    1. Closed fracture of distal end of right femur, unspecified fracture morphology, initial encounter (H)  S72.401A       2. S/P TKR (total knee replacement), right  Z96.651             Scribe Disclosure:  I, Iban Gurrola, am serving as a scribe at 12:18 PM on 12/27/2023 to document services personally performed by Bruna Pineda MD based on my observations and the provider's statements to me.     12/27/2023   Bruna Pineda MD Jonkman, Tracy Dianne, MD  12/27/23 3562

## 2023-12-27 NOTE — ED TRIAGE NOTES
Pt arrives via EMS from home. She had a Total R knee replacement 3 weeks ago and has been undergoing PT. She slipped down about 3-steps today. R knee is swollen with a new indentation above the incision. Pt is unable to bear weight on that leg due to pain, no abnormal rotation. VSS, aside from HTN, SBP: 220. Pt is alert and oriented, did not hit her head or lose consciousness, and she is not on blood thinners.      Triage Assessment (Adult)       Row Name 12/27/23 1138          Triage Assessment    Airway WDL WDL        Respiratory WDL    Respiratory WDL WDL        Skin Circulation/Temperature WDL    Skin Circulation/Temperature WDL WDL        Cardiac WDL    Cardiac WDL WDL        Peripheral/Neurovascular WDL    Peripheral Neurovascular WDL WDL        Cognitive/Neuro/Behavioral WDL    Cognitive/Neuro/Behavioral WDL WDL

## 2023-12-27 NOTE — PROGRESS NOTES
[facility]    ED Boarding Nurse Handoff Addendum Report:    Date/time: 12/27/2023, 4:56 PM    Activity Level: assist of 2    Fall Risk: Yes:  bed/chair alarm on, nonskid shoes/slippers when out of bed, arm band in place, patient and family education, and assistive device/personal items within reach    Active Infusions: NS 75ml/hr    Current Meds Due: Med not verified yet.    Current care needs: Elimination needs.    Oxygen requirements (liters/min and/or FiO2): none    Respiratory status: Room air    Vital signs (within last 30 minutes):    Vitals:    12/27/23 1513 12/27/23 1515 12/27/23 1545 12/27/23 1600   BP: 100/61 111/60 119/58 97/68   Pulse:    60   Resp:       Temp:       TempSrc:       SpO2: 96% 97% 96% 93%   Weight:       Height:           Focused assessment within last 30 minutes:    PRN dilaudid given for pain.     ED Boarding Nurse name: Dulce Lay RN

## 2023-12-28 ENCOUNTER — APPOINTMENT (OUTPATIENT)
Dept: GENERAL RADIOLOGY | Facility: CLINIC | Age: 78
DRG: 480 | End: 2023-12-28
Attending: STUDENT IN AN ORGANIZED HEALTH CARE EDUCATION/TRAINING PROGRAM
Payer: COMMERCIAL

## 2023-12-28 ENCOUNTER — ANESTHESIA EVENT (OUTPATIENT)
Dept: SURGERY | Facility: CLINIC | Age: 78
DRG: 480 | End: 2023-12-28
Payer: COMMERCIAL

## 2023-12-28 ENCOUNTER — ANESTHESIA (OUTPATIENT)
Dept: SURGERY | Facility: CLINIC | Age: 78
DRG: 480 | End: 2023-12-28
Payer: COMMERCIAL

## 2023-12-28 LAB
ABO/RH(D): NORMAL
ANION GAP SERPL CALCULATED.3IONS-SCNC: 8 MMOL/L (ref 7–15)
ANTIBODY SCREEN: NEGATIVE
BUN SERPL-MCNC: 17.8 MG/DL (ref 8–23)
CALCIUM SERPL-MCNC: 8.2 MG/DL (ref 8.8–10.2)
CHLORIDE SERPL-SCNC: 97 MMOL/L (ref 98–107)
CREAT SERPL-MCNC: 0.65 MG/DL (ref 0.51–0.95)
DEPRECATED HCO3 PLAS-SCNC: 26 MMOL/L (ref 22–29)
EGFRCR SERPLBLD CKD-EPI 2021: 89 ML/MIN/1.73M2
ERYTHROCYTE [DISTWIDTH] IN BLOOD BY AUTOMATED COUNT: 14.1 % (ref 10–15)
GLUCOSE SERPL-MCNC: 114 MG/DL (ref 70–99)
HCT VFR BLD AUTO: 29.5 % (ref 35–47)
HGB BLD-MCNC: 8.4 G/DL (ref 11.7–15.7)
HGB BLD-MCNC: 9.6 G/DL (ref 11.7–15.7)
MCH RBC QN AUTO: 31.1 PG (ref 26.5–33)
MCHC RBC AUTO-ENTMCNC: 32.5 G/DL (ref 31.5–36.5)
MCV RBC AUTO: 96 FL (ref 78–100)
PLATELET # BLD AUTO: 218 10E3/UL (ref 150–450)
POTASSIUM SERPL-SCNC: 3.2 MMOL/L (ref 3.4–5.3)
POTASSIUM SERPL-SCNC: 4 MMOL/L (ref 3.4–5.3)
RBC # BLD AUTO: 3.09 10E6/UL (ref 3.8–5.2)
SODIUM SERPL-SCNC: 131 MMOL/L (ref 135–145)
SPECIMEN EXPIRATION DATE: NORMAL
WBC # BLD AUTO: 6.9 10E3/UL (ref 4–11)

## 2023-12-28 PROCEDURE — 258N000003 HC RX IP 258 OP 636: Performed by: ANESTHESIOLOGY

## 2023-12-28 PROCEDURE — 272N000001 HC OR GENERAL SUPPLY STERILE: Performed by: STUDENT IN AN ORGANIZED HEALTH CARE EDUCATION/TRAINING PROGRAM

## 2023-12-28 PROCEDURE — 36415 COLL VENOUS BLD VENIPUNCTURE: CPT | Performed by: STUDENT IN AN ORGANIZED HEALTH CARE EDUCATION/TRAINING PROGRAM

## 2023-12-28 PROCEDURE — 250N000011 HC RX IP 250 OP 636: Performed by: STUDENT IN AN ORGANIZED HEALTH CARE EDUCATION/TRAINING PROGRAM

## 2023-12-28 PROCEDURE — 250N000009 HC RX 250: Performed by: NURSE ANESTHETIST, CERTIFIED REGISTERED

## 2023-12-28 PROCEDURE — 250N000025 HC SEVOFLURANE, PER MIN: Performed by: STUDENT IN AN ORGANIZED HEALTH CARE EDUCATION/TRAINING PROGRAM

## 2023-12-28 PROCEDURE — 250N000009 HC RX 250: Performed by: STUDENT IN AN ORGANIZED HEALTH CARE EDUCATION/TRAINING PROGRAM

## 2023-12-28 PROCEDURE — C1769 GUIDE WIRE: HCPCS | Performed by: STUDENT IN AN ORGANIZED HEALTH CARE EDUCATION/TRAINING PROGRAM

## 2023-12-28 PROCEDURE — 86923 COMPATIBILITY TEST ELECTRIC: CPT | Performed by: INTERNAL MEDICINE

## 2023-12-28 PROCEDURE — 258N000003 HC RX IP 258 OP 636: Performed by: INTERNAL MEDICINE

## 2023-12-28 PROCEDURE — 258N000003 HC RX IP 258 OP 636: Performed by: STUDENT IN AN ORGANIZED HEALTH CARE EDUCATION/TRAINING PROGRAM

## 2023-12-28 PROCEDURE — 120N000001 HC R&B MED SURG/OB

## 2023-12-28 PROCEDURE — 250N000011 HC RX IP 250 OP 636: Performed by: NURSE ANESTHETIST, CERTIFIED REGISTERED

## 2023-12-28 PROCEDURE — 85018 HEMOGLOBIN: CPT | Performed by: ANESTHESIOLOGY

## 2023-12-28 PROCEDURE — 999N000065 XR FEMUR PORT RIGHT 2 VIEWS: Mod: RT

## 2023-12-28 PROCEDURE — 370N000017 HC ANESTHESIA TECHNICAL FEE, PER MIN: Performed by: STUDENT IN AN ORGANIZED HEALTH CARE EDUCATION/TRAINING PROGRAM

## 2023-12-28 PROCEDURE — C1713 ANCHOR/SCREW BN/BN,TIS/BN: HCPCS | Performed by: STUDENT IN AN ORGANIZED HEALTH CARE EDUCATION/TRAINING PROGRAM

## 2023-12-28 PROCEDURE — 250N000011 HC RX IP 250 OP 636: Performed by: INTERNAL MEDICINE

## 2023-12-28 PROCEDURE — 250N000013 HC RX MED GY IP 250 OP 250 PS 637: Performed by: ANESTHESIOLOGY

## 2023-12-28 PROCEDURE — 250N000013 HC RX MED GY IP 250 OP 250 PS 637: Performed by: STUDENT IN AN ORGANIZED HEALTH CARE EDUCATION/TRAINING PROGRAM

## 2023-12-28 PROCEDURE — 80048 BASIC METABOLIC PNL TOTAL CA: CPT | Performed by: INTERNAL MEDICINE

## 2023-12-28 PROCEDURE — 710N000009 HC RECOVERY PHASE 1, LEVEL 1, PER MIN: Performed by: STUDENT IN AN ORGANIZED HEALTH CARE EDUCATION/TRAINING PROGRAM

## 2023-12-28 PROCEDURE — 85027 COMPLETE CBC AUTOMATED: CPT | Performed by: INTERNAL MEDICINE

## 2023-12-28 PROCEDURE — 86900 BLOOD TYPING SEROLOGIC ABO: CPT | Performed by: STUDENT IN AN ORGANIZED HEALTH CARE EDUCATION/TRAINING PROGRAM

## 2023-12-28 PROCEDURE — 250N000011 HC RX IP 250 OP 636: Performed by: ANESTHESIOLOGY

## 2023-12-28 PROCEDURE — 250N000013 HC RX MED GY IP 250 OP 250 PS 637: Performed by: INTERNAL MEDICINE

## 2023-12-28 PROCEDURE — 272N000002 HC OR SUPPLY OTHER OPNP: Performed by: STUDENT IN AN ORGANIZED HEALTH CARE EDUCATION/TRAINING PROGRAM

## 2023-12-28 PROCEDURE — 999N000141 HC STATISTIC PRE-PROCEDURE NURSING ASSESSMENT: Performed by: STUDENT IN AN ORGANIZED HEALTH CARE EDUCATION/TRAINING PROGRAM

## 2023-12-28 PROCEDURE — 36415 COLL VENOUS BLD VENIPUNCTURE: CPT | Performed by: ANESTHESIOLOGY

## 2023-12-28 PROCEDURE — 360N000084 HC SURGERY LEVEL 4 W/ FLUORO, PER MIN: Performed by: STUDENT IN AN ORGANIZED HEALTH CARE EDUCATION/TRAINING PROGRAM

## 2023-12-28 PROCEDURE — 36415 COLL VENOUS BLD VENIPUNCTURE: CPT | Performed by: INTERNAL MEDICINE

## 2023-12-28 PROCEDURE — 99232 SBSQ HOSP IP/OBS MODERATE 35: CPT | Performed by: INTERNAL MEDICINE

## 2023-12-28 PROCEDURE — 258N000003 HC RX IP 258 OP 636: Performed by: NURSE ANESTHETIST, CERTIFIED REGISTERED

## 2023-12-28 PROCEDURE — 0QSB06Z REPOSITION RIGHT LOWER FEMUR WITH INTRAMEDULLARY INTERNAL FIXATION DEVICE, OPEN APPROACH: ICD-10-PCS | Performed by: STUDENT IN AN ORGANIZED HEALTH CARE EDUCATION/TRAINING PROGRAM

## 2023-12-28 PROCEDURE — 999N000179 XR SURGERY CARM FLUORO LESS THAN 5 MIN W STILLS: Mod: TC

## 2023-12-28 PROCEDURE — 84132 ASSAY OF SERUM POTASSIUM: CPT | Performed by: INTERNAL MEDICINE

## 2023-12-28 DEVICE — ENDCAP 0MM RFN-ADV STRL 04.233.000S: Type: IMPLANTABLE DEVICE | Site: LEG | Status: FUNCTIONAL

## 2023-12-28 DEVICE — IMPLANTABLE DEVICE: Type: IMPLANTABLE DEVICE | Site: LEG | Status: FUNCTIONAL

## 2023-12-28 DEVICE — SCREW BN 38MM 5MM LCK X25 IM NL 04.045.038S: Type: IMPLANTABLE DEVICE | Site: LEG | Status: FUNCTIONAL

## 2023-12-28 DEVICE — IMP CABLE SYN ORTHO COCR W/CRIMP 1.7X750MM 611.105.01S: Type: IMPLANTABLE DEVICE | Site: LEG | Status: FUNCTIONAL

## 2023-12-28 RX ORDER — HYDROMORPHONE HCL IN WATER/PF 6 MG/30 ML
0.4 PATIENT CONTROLLED ANALGESIA SYRINGE INTRAVENOUS
Status: DISCONTINUED | OUTPATIENT
Start: 2023-12-28 | End: 2023-12-30

## 2023-12-28 RX ORDER — ACETAMINOPHEN 325 MG/1
650 TABLET ORAL EVERY 4 HOURS PRN
Status: DISCONTINUED | OUTPATIENT
Start: 2023-12-31 | End: 2024-01-03 | Stop reason: HOSPADM

## 2023-12-28 RX ORDER — LIDOCAINE 40 MG/G
CREAM TOPICAL
Status: DISCONTINUED | OUTPATIENT
Start: 2023-12-28 | End: 2023-12-28

## 2023-12-28 RX ORDER — ALBUTEROL SULFATE 0.83 MG/ML
2.5 SOLUTION RESPIRATORY (INHALATION) EVERY 4 HOURS PRN
Status: DISCONTINUED | OUTPATIENT
Start: 2023-12-28 | End: 2023-12-28 | Stop reason: HOSPADM

## 2023-12-28 RX ORDER — PROPOFOL 10 MG/ML
INJECTION, EMULSION INTRAVENOUS PRN
Status: DISCONTINUED | OUTPATIENT
Start: 2023-12-28 | End: 2023-12-28

## 2023-12-28 RX ORDER — SODIUM CHLORIDE, SODIUM LACTATE, POTASSIUM CHLORIDE, CALCIUM CHLORIDE 600; 310; 30; 20 MG/100ML; MG/100ML; MG/100ML; MG/100ML
INJECTION, SOLUTION INTRAVENOUS CONTINUOUS
Status: DISCONTINUED | OUTPATIENT
Start: 2023-12-28 | End: 2023-12-29

## 2023-12-28 RX ORDER — DEXAMETHASONE SODIUM PHOSPHATE 4 MG/ML
INJECTION, SOLUTION INTRA-ARTICULAR; INTRALESIONAL; INTRAMUSCULAR; INTRAVENOUS; SOFT TISSUE PRN
Status: DISCONTINUED | OUTPATIENT
Start: 2023-12-28 | End: 2023-12-28

## 2023-12-28 RX ORDER — CEFAZOLIN SODIUM/WATER 2 G/20 ML
2 SYRINGE (ML) INTRAVENOUS
Status: COMPLETED | OUTPATIENT
Start: 2023-12-28 | End: 2023-12-28

## 2023-12-28 RX ORDER — POTASSIUM CHLORIDE 1500 MG/1
40 TABLET, EXTENDED RELEASE ORAL ONCE
Status: COMPLETED | OUTPATIENT
Start: 2023-12-28 | End: 2023-12-28

## 2023-12-28 RX ORDER — TRANEXAMIC ACID 10 MG/ML
1 INJECTION, SOLUTION INTRAVENOUS ONCE
Status: COMPLETED | OUTPATIENT
Start: 2023-12-28 | End: 2023-12-28

## 2023-12-28 RX ORDER — MAGNESIUM HYDROXIDE 1200 MG/15ML
LIQUID ORAL PRN
Status: DISCONTINUED | OUTPATIENT
Start: 2023-12-28 | End: 2023-12-28 | Stop reason: HOSPADM

## 2023-12-28 RX ORDER — NALOXONE HYDROCHLORIDE 0.4 MG/ML
0.4 INJECTION, SOLUTION INTRAMUSCULAR; INTRAVENOUS; SUBCUTANEOUS
Status: DISCONTINUED | OUTPATIENT
Start: 2023-12-28 | End: 2024-01-03 | Stop reason: HOSPADM

## 2023-12-28 RX ORDER — VANCOMYCIN HYDROCHLORIDE 1 G/20ML
INJECTION, POWDER, LYOPHILIZED, FOR SOLUTION INTRAVENOUS PRN
Status: DISCONTINUED | OUTPATIENT
Start: 2023-12-28 | End: 2023-12-28 | Stop reason: HOSPADM

## 2023-12-28 RX ORDER — HYDROMORPHONE HCL IN WATER/PF 6 MG/30 ML
0.2 PATIENT CONTROLLED ANALGESIA SYRINGE INTRAVENOUS
Status: DISCONTINUED | OUTPATIENT
Start: 2023-12-28 | End: 2023-12-30

## 2023-12-28 RX ORDER — METHADONE HYDROCHLORIDE 10 MG/ML
INJECTION, SOLUTION INTRAMUSCULAR; INTRAVENOUS; SUBCUTANEOUS PRN
Status: DISCONTINUED | OUTPATIENT
Start: 2023-12-28 | End: 2023-12-28

## 2023-12-28 RX ORDER — KETOROLAC TROMETHAMINE 15 MG/ML
15 INJECTION, SOLUTION INTRAMUSCULAR; INTRAVENOUS
Status: DISCONTINUED | OUTPATIENT
Start: 2023-12-28 | End: 2023-12-28 | Stop reason: HOSPADM

## 2023-12-28 RX ORDER — PROCHLORPERAZINE MALEATE 5 MG
5 TABLET ORAL EVERY 6 HOURS PRN
Status: DISCONTINUED | OUTPATIENT
Start: 2023-12-28 | End: 2024-01-03 | Stop reason: HOSPADM

## 2023-12-28 RX ORDER — ONDANSETRON 2 MG/ML
4 INJECTION INTRAMUSCULAR; INTRAVENOUS EVERY 30 MIN PRN
Status: DISCONTINUED | OUTPATIENT
Start: 2023-12-28 | End: 2023-12-28 | Stop reason: HOSPADM

## 2023-12-28 RX ORDER — CEFAZOLIN SODIUM 2 G/100ML
2 INJECTION, SOLUTION INTRAVENOUS SEE ADMIN INSTRUCTIONS
Status: DISCONTINUED | OUTPATIENT
Start: 2023-12-28 | End: 2023-12-28

## 2023-12-28 RX ORDER — DIPHENHYDRAMINE HCL 12.5MG/5ML
12.5 LIQUID (ML) ORAL EVERY 6 HOURS PRN
Status: DISCONTINUED | OUTPATIENT
Start: 2023-12-28 | End: 2023-12-28 | Stop reason: HOSPADM

## 2023-12-28 RX ORDER — PREDNISOLONE ACETATE 10 MG/ML
1 SUSPENSION/ DROPS OPHTHALMIC 4 TIMES DAILY
Status: DISCONTINUED | OUTPATIENT
Start: 2023-12-28 | End: 2023-12-31

## 2023-12-28 RX ORDER — ACETAMINOPHEN 325 MG/1
975 TABLET ORAL EVERY 8 HOURS
Qty: 27 TABLET | Refills: 0 | Status: COMPLETED | OUTPATIENT
Start: 2023-12-28 | End: 2023-12-31

## 2023-12-28 RX ORDER — LIDOCAINE HYDROCHLORIDE 20 MG/ML
INJECTION, SOLUTION INFILTRATION; PERINEURAL PRN
Status: DISCONTINUED | OUTPATIENT
Start: 2023-12-28 | End: 2023-12-28

## 2023-12-28 RX ORDER — LIDOCAINE 40 MG/G
CREAM TOPICAL
Status: DISCONTINUED | OUTPATIENT
Start: 2023-12-28 | End: 2024-01-03 | Stop reason: HOSPADM

## 2023-12-28 RX ORDER — LABETALOL HYDROCHLORIDE 5 MG/ML
10 INJECTION, SOLUTION INTRAVENOUS
Status: DISCONTINUED | OUTPATIENT
Start: 2023-12-28 | End: 2023-12-28 | Stop reason: HOSPADM

## 2023-12-28 RX ORDER — AMOXICILLIN 250 MG
1 CAPSULE ORAL 2 TIMES DAILY
Status: DISCONTINUED | OUTPATIENT
Start: 2023-12-28 | End: 2024-01-03 | Stop reason: HOSPADM

## 2023-12-28 RX ORDER — CEFAZOLIN SODIUM 1 G/3ML
1 INJECTION, POWDER, FOR SOLUTION INTRAMUSCULAR; INTRAVENOUS EVERY 8 HOURS
Qty: 10 ML | Refills: 0 | Status: COMPLETED | OUTPATIENT
Start: 2023-12-28 | End: 2023-12-29

## 2023-12-28 RX ORDER — METHADONE HYDROCHLORIDE 10 MG/ML
2 INJECTION, SOLUTION INTRAMUSCULAR; INTRAVENOUS; SUBCUTANEOUS 3 TIMES DAILY PRN
Status: DISCONTINUED | OUTPATIENT
Start: 2023-12-28 | End: 2023-12-28 | Stop reason: HOSPADM

## 2023-12-28 RX ORDER — DIPHENHYDRAMINE HYDROCHLORIDE 50 MG/ML
12.5 INJECTION INTRAMUSCULAR; INTRAVENOUS EVERY 6 HOURS PRN
Status: DISCONTINUED | OUTPATIENT
Start: 2023-12-28 | End: 2023-12-28 | Stop reason: HOSPADM

## 2023-12-28 RX ORDER — CEFAZOLIN SODIUM 2 G/100ML
2 INJECTION, SOLUTION INTRAVENOUS
Status: DISCONTINUED | OUTPATIENT
Start: 2023-12-28 | End: 2023-12-28

## 2023-12-28 RX ORDER — ONDANSETRON 4 MG/1
4 TABLET, ORALLY DISINTEGRATING ORAL EVERY 30 MIN PRN
Status: DISCONTINUED | OUTPATIENT
Start: 2023-12-28 | End: 2023-12-28 | Stop reason: HOSPADM

## 2023-12-28 RX ORDER — SODIUM CHLORIDE, SODIUM LACTATE, POTASSIUM CHLORIDE, CALCIUM CHLORIDE 600; 310; 30; 20 MG/100ML; MG/100ML; MG/100ML; MG/100ML
INJECTION, SOLUTION INTRAVENOUS CONTINUOUS
Status: DISCONTINUED | OUTPATIENT
Start: 2023-12-28 | End: 2023-12-28

## 2023-12-28 RX ORDER — OXYCODONE HYDROCHLORIDE 10 MG/1
10 TABLET ORAL EVERY 4 HOURS PRN
Status: DISCONTINUED | OUTPATIENT
Start: 2023-12-28 | End: 2024-01-03 | Stop reason: HOSPADM

## 2023-12-28 RX ORDER — ONDANSETRON 2 MG/ML
INJECTION INTRAMUSCULAR; INTRAVENOUS PRN
Status: DISCONTINUED | OUTPATIENT
Start: 2023-12-28 | End: 2023-12-28

## 2023-12-28 RX ORDER — MAGNESIUM SULFATE HEPTAHYDRATE 40 MG/ML
2 INJECTION, SOLUTION INTRAVENOUS
Status: COMPLETED | OUTPATIENT
Start: 2023-12-28 | End: 2023-12-28

## 2023-12-28 RX ORDER — NALOXONE HYDROCHLORIDE 0.4 MG/ML
0.2 INJECTION, SOLUTION INTRAMUSCULAR; INTRAVENOUS; SUBCUTANEOUS
Status: DISCONTINUED | OUTPATIENT
Start: 2023-12-28 | End: 2024-01-03 | Stop reason: HOSPADM

## 2023-12-28 RX ORDER — ONDANSETRON 4 MG/1
4 TABLET, ORALLY DISINTEGRATING ORAL EVERY 6 HOURS PRN
Status: DISCONTINUED | OUTPATIENT
Start: 2023-12-28 | End: 2023-12-28

## 2023-12-28 RX ORDER — ONDANSETRON 2 MG/ML
4 INJECTION INTRAMUSCULAR; INTRAVENOUS EVERY 6 HOURS PRN
Status: DISCONTINUED | OUTPATIENT
Start: 2023-12-28 | End: 2023-12-28

## 2023-12-28 RX ORDER — ACETAMINOPHEN 325 MG/1
975 TABLET ORAL ONCE
Status: COMPLETED | OUTPATIENT
Start: 2023-12-28 | End: 2023-12-28

## 2023-12-28 RX ORDER — POLYETHYLENE GLYCOL 3350 17 G/17G
17 POWDER, FOR SOLUTION ORAL DAILY
Status: DISCONTINUED | OUTPATIENT
Start: 2023-12-29 | End: 2024-01-03 | Stop reason: HOSPADM

## 2023-12-28 RX ORDER — HYDRALAZINE HYDROCHLORIDE 20 MG/ML
10 INJECTION INTRAMUSCULAR; INTRAVENOUS EVERY 10 MIN PRN
Status: DISCONTINUED | OUTPATIENT
Start: 2023-12-28 | End: 2023-12-28 | Stop reason: HOSPADM

## 2023-12-28 RX ORDER — CEFAZOLIN SODIUM/WATER 2 G/20 ML
2 SYRINGE (ML) INTRAVENOUS SEE ADMIN INSTRUCTIONS
Status: DISCONTINUED | OUTPATIENT
Start: 2023-12-28 | End: 2023-12-28

## 2023-12-28 RX ORDER — OXYCODONE HYDROCHLORIDE 5 MG/1
5 TABLET ORAL EVERY 4 HOURS PRN
Status: DISCONTINUED | OUTPATIENT
Start: 2023-12-28 | End: 2024-01-03 | Stop reason: HOSPADM

## 2023-12-28 RX ORDER — BISACODYL 10 MG
10 SUPPOSITORY, RECTAL RECTAL DAILY PRN
Status: DISCONTINUED | OUTPATIENT
Start: 2023-12-28 | End: 2024-01-03 | Stop reason: HOSPADM

## 2023-12-28 RX ORDER — SODIUM CHLORIDE, SODIUM LACTATE, POTASSIUM CHLORIDE, CALCIUM CHLORIDE 600; 310; 30; 20 MG/100ML; MG/100ML; MG/100ML; MG/100ML
INJECTION, SOLUTION INTRAVENOUS CONTINUOUS
Status: DISCONTINUED | OUTPATIENT
Start: 2023-12-28 | End: 2023-12-28 | Stop reason: HOSPADM

## 2023-12-28 RX ORDER — EPHEDRINE SULFATE 50 MG/ML
INJECTION, SOLUTION INTRAMUSCULAR; INTRAVENOUS; SUBCUTANEOUS PRN
Status: DISCONTINUED | OUTPATIENT
Start: 2023-12-28 | End: 2023-12-28

## 2023-12-28 RX ORDER — KETOROLAC TROMETHAMINE 30 MG/ML
INJECTION, SOLUTION INTRAMUSCULAR; INTRAVENOUS PRN
Status: DISCONTINUED | OUTPATIENT
Start: 2023-12-28 | End: 2023-12-28

## 2023-12-28 RX ADMIN — DEXMEDETOMIDINE HYDROCHLORIDE 0.5 MCG/KG/HR: 100 INJECTION, SOLUTION INTRAVENOUS at 07:39

## 2023-12-28 RX ADMIN — Medication 5 MG: at 08:31

## 2023-12-28 RX ADMIN — SENNOSIDES AND DOCUSATE SODIUM 1 TABLET: 8.6; 5 TABLET ORAL at 20:13

## 2023-12-28 RX ADMIN — PHENYLEPHRINE HYDROCHLORIDE 100 MCG: 10 INJECTION INTRAVENOUS at 09:55

## 2023-12-28 RX ADMIN — SODIUM CHLORIDE, POTASSIUM CHLORIDE, SODIUM LACTATE AND CALCIUM CHLORIDE: 600; 310; 30; 20 INJECTION, SOLUTION INTRAVENOUS at 19:40

## 2023-12-28 RX ADMIN — CEFAZOLIN 1 G: 1 INJECTION, POWDER, FOR SOLUTION INTRAMUSCULAR; INTRAVENOUS at 23:40

## 2023-12-28 RX ADMIN — POTASSIUM CHLORIDE 40 MEQ: 1500 TABLET, EXTENDED RELEASE ORAL at 19:23

## 2023-12-28 RX ADMIN — Medication 5 MG: at 10:54

## 2023-12-28 RX ADMIN — ACETAMINOPHEN 975 MG: 325 TABLET, FILM COATED ORAL at 15:03

## 2023-12-28 RX ADMIN — Medication 5 MG: at 08:36

## 2023-12-28 RX ADMIN — Medication 5 MG: at 07:43

## 2023-12-28 RX ADMIN — PHENYLEPHRINE HYDROCHLORIDE 300 MCG: 10 INJECTION INTRAVENOUS at 07:42

## 2023-12-28 RX ADMIN — PHENYLEPHRINE HYDROCHLORIDE 100 MCG: 10 INJECTION INTRAVENOUS at 09:10

## 2023-12-28 RX ADMIN — PROPOFOL 140 MG: 10 INJECTION, EMULSION INTRAVENOUS at 07:39

## 2023-12-28 RX ADMIN — PHENYLEPHRINE HYDROCHLORIDE 100 MCG: 10 INJECTION INTRAVENOUS at 08:59

## 2023-12-28 RX ADMIN — PHENYLEPHRINE HYDROCHLORIDE 100 MCG: 10 INJECTION INTRAVENOUS at 10:07

## 2023-12-28 RX ADMIN — Medication 2 G: at 07:34

## 2023-12-28 RX ADMIN — PHENYLEPHRINE HYDROCHLORIDE 100 MCG: 10 INJECTION INTRAVENOUS at 09:25

## 2023-12-28 RX ADMIN — PHENYLEPHRINE HYDROCHLORIDE 100 MCG: 10 INJECTION INTRAVENOUS at 08:04

## 2023-12-28 RX ADMIN — SODIUM CHLORIDE: 9 INJECTION, SOLUTION INTRAVENOUS at 03:17

## 2023-12-28 RX ADMIN — SODIUM CHLORIDE, POTASSIUM CHLORIDE, SODIUM LACTATE AND CALCIUM CHLORIDE: 600; 310; 30; 20 INJECTION, SOLUTION INTRAVENOUS at 07:34

## 2023-12-28 RX ADMIN — SODIUM CHLORIDE, POTASSIUM CHLORIDE, SODIUM LACTATE AND CALCIUM CHLORIDE: 600; 310; 30; 20 INJECTION, SOLUTION INTRAVENOUS at 16:23

## 2023-12-28 RX ADMIN — CEFAZOLIN 1 G: 1 INJECTION, POWDER, FOR SOLUTION INTRAMUSCULAR; INTRAVENOUS at 15:04

## 2023-12-28 RX ADMIN — Medication 10 MG: at 07:39

## 2023-12-28 RX ADMIN — SODIUM CHLORIDE, POTASSIUM CHLORIDE, SODIUM LACTATE AND CALCIUM CHLORIDE: 600; 310; 30; 20 INJECTION, SOLUTION INTRAVENOUS at 10:26

## 2023-12-28 RX ADMIN — SODIUM CHLORIDE, POTASSIUM CHLORIDE, SODIUM LACTATE AND CALCIUM CHLORIDE: 600; 310; 30; 20 INJECTION, SOLUTION INTRAVENOUS at 08:36

## 2023-12-28 RX ADMIN — PHENYLEPHRINE HYDROCHLORIDE 100 MCG: 10 INJECTION INTRAVENOUS at 10:16

## 2023-12-28 RX ADMIN — Medication 5 MG: at 08:59

## 2023-12-28 RX ADMIN — KETOROLAC TROMETHAMINE 15 MG: 30 INJECTION, SOLUTION INTRAMUSCULAR at 07:39

## 2023-12-28 RX ADMIN — TRANEXAMIC ACID 1 G: 10 INJECTION, SOLUTION INTRAVENOUS at 10:21

## 2023-12-28 RX ADMIN — PHENYLEPHRINE HYDROCHLORIDE 100 MCG: 10 INJECTION INTRAVENOUS at 10:19

## 2023-12-28 RX ADMIN — HYDROMORPHONE HYDROCHLORIDE 0.2 MG: 0.2 INJECTION, SOLUTION INTRAMUSCULAR; INTRAVENOUS; SUBCUTANEOUS at 04:28

## 2023-12-28 RX ADMIN — DEXAMETHASONE SODIUM PHOSPHATE 8 MG: 4 INJECTION, SOLUTION INTRA-ARTICULAR; INTRALESIONAL; INTRAMUSCULAR; INTRAVENOUS; SOFT TISSUE at 07:39

## 2023-12-28 RX ADMIN — LIDOCAINE HYDROCHLORIDE 50 MG: 20 INJECTION, SOLUTION INFILTRATION; PERINEURAL at 07:39

## 2023-12-28 RX ADMIN — ONDANSETRON 4 MG: 2 INJECTION INTRAMUSCULAR; INTRAVENOUS at 07:39

## 2023-12-28 RX ADMIN — Medication 5 MG: at 08:10

## 2023-12-28 RX ADMIN — PHENYLEPHRINE HYDROCHLORIDE 100 MCG: 10 INJECTION INTRAVENOUS at 08:36

## 2023-12-28 RX ADMIN — PHENYLEPHRINE HYDROCHLORIDE 100 MCG: 10 INJECTION INTRAVENOUS at 09:34

## 2023-12-28 RX ADMIN — MAGNESIUM SULFATE HEPTAHYDRATE 2 G: 2 INJECTION, SOLUTION INTRAVENOUS at 11:39

## 2023-12-28 RX ADMIN — ROSUVASTATIN CALCIUM 20 MG: 20 TABLET, FILM COATED ORAL at 20:13

## 2023-12-28 RX ADMIN — PHENYLEPHRINE HYDROCHLORIDE 100 MCG: 10 INJECTION INTRAVENOUS at 09:58

## 2023-12-28 RX ADMIN — TRANEXAMIC ACID 1 G: 10 INJECTION, SOLUTION INTRAVENOUS at 07:35

## 2023-12-28 RX ADMIN — PHENYLEPHRINE HYDROCHLORIDE 100 MCG: 10 INJECTION INTRAVENOUS at 09:47

## 2023-12-28 RX ADMIN — ATENOLOL 100 MG: 50 TABLET ORAL at 20:13

## 2023-12-28 RX ADMIN — ACETAMINOPHEN 975 MG: 325 TABLET, FILM COATED ORAL at 06:46

## 2023-12-28 RX ADMIN — ACETAMINOPHEN 975 MG: 325 TABLET, FILM COATED ORAL at 23:40

## 2023-12-28 ASSESSMENT — ACTIVITIES OF DAILY LIVING (ADL)
ADLS_ACUITY_SCORE: 26
ADLS_ACUITY_SCORE: 26
ADLS_ACUITY_SCORE: 41
ADLS_ACUITY_SCORE: 26
ADLS_ACUITY_SCORE: 41
ADLS_ACUITY_SCORE: 26
ADLS_ACUITY_SCORE: 41
ADLS_ACUITY_SCORE: 26
ADLS_ACUITY_SCORE: 41
ADLS_ACUITY_SCORE: 26

## 2023-12-28 NOTE — ANESTHESIA PROCEDURE NOTES
Airway       Patient location during procedure: OR       Procedure Start/Stop Times: 12/28/2023 7:39 AM  Staff -        CRNA: Jorge L Dimas APRN CRNA       Performed By: CRNAIndications and Patient Condition       Indications for airway management: sofia-procedural and airway protection       Induction type:intravenous       Mask difficulty assessment: 0 - not attempted    Final Airway Details       Final airway type: supraglottic airway    Supraglottic Airway Details        Type: LMA       Brand: I-Gel       LMA size: 4    Post intubation assessment        Placement verified by: capnometry, equal breath sounds and chest rise        Number of attempts at approach: 1       Secured with: commercial tube lee       Ease of procedure: easy       Dentition: Intact    Medication(s) Administered   Medication Administration Time: 12/28/2023 7:39 AM

## 2023-12-28 NOTE — PHARMACY-ADMISSION MEDICATION HISTORY
Pharmacy Intern Admission Medication History    Admission medication history is complete. The information provided in this note is only as accurate as the sources available at the time of the update.    Information Source(s): Patient, Prescription bottles, and CareEverywhere/SureScripts via in-person    Pertinent Information: None    Changes made to PTA medication list:  Added: Tylenol, Clobetasol, Hydroxyzine, Ibuprofen, Oxycodone, Pred Forte, Senna-S, Aspirin  Deleted: Elderberry, Red Yeast Rice  Changed: None       Allergies reviewed with patient and updates made in EHR: yes    Medication History Completed By: Yung Swartz 12/27/2023 6:55 PM    Prior to Admission medications    Medication Sig Last Dose Taking? Auth Provider Long Term End Date   acetaminophen (TYLENOL) 500 MG tablet Take 1,000 mg by mouth every 8 hours as needed for mild pain 12/26/2023 at PM Yes Unknown, Entered By History     aspirin (ASA) 325 MG EC tablet Take 325 mg by mouth 2 times daily 12/27/2023 at AM Yes Unknown, Entered By History     atenolol (TENORMIN) 100 MG tablet Take 100 mg by mouth daily 12/26/2023 at PM Yes Reported, Patient     B Complex Vitamins (VITAMIN  B COMPLEX) tablet Take 1 tablet by mouth daily 12/26/2023 at PM Yes Reported, Patient     clobetasol (TEMOVATE) 0.05 % external ointment Apply thin layer to affected area daily 12/26/2023 at PM Yes Unknown, Entered By History     Coenzyme Q10 (COQ-10 PO) Take 1 tablet by mouth daily 12/26/2023 at PM Yes Reported, Patient     hydrOXYzine HCl (ATARAX) 10 MG tablet Take 10 mg by mouth every 8 hours as needed for itching 12/26/2023 at PM Yes Unknown, Entered By History     ibuprofen (ADVIL/MOTRIN) 600 MG tablet Take 600 mg by mouth every 8 hours as needed for moderate pain 12/27/2023 at AM Yes Unknown, Entered By History     lisinopril (ZESTRIL) 10 MG tablet Take 1 tablet (10 mg) by mouth daily for 30 days 12/26/2023 at PM Yes Romel Moulton MD Yes 1/3/24   Multiple Vitamin  (MULTI-VITAMIN) per tablet Take 1 tablet by mouth daily 12/26/2023 at PM Yes Reported, Patient     oxyCODONE (OXY-IR) 5 MG capsule Take 5 mg by mouth every 4 hours as needed for severe pain 12/27/2023 at AM Yes Unknown, Entered By History     prednisoLONE acetate (PRED FORTE) 1 % ophthalmic suspension Place 1 drop into both eyes 4 times daily 12/26/2023 at PM Yes Unknown, Entered By History     rosuvastatin (CRESTOR) 20 MG tablet Take 20 mg by mouth daily 12/26/2023 at PM Yes Reported, Patient Yes    senna-docusate (SENOKOT-S/PERICOLACE) 8.6-50 MG tablet Take 1-2 tablets by mouth 2 times daily as needed for constipation Unknown at prn Yes Unknown, Entered By History     VITAMIN D, CHOLECALCIFEROL, PO Take 1 capsule by mouth daily 12/26/2023 at PM Yes Reported, Patient

## 2023-12-28 NOTE — ANESTHESIA PREPROCEDURE EVALUATION
Anesthesia Pre-Procedure Evaluation    Patient: Mercedes Nelson   MRN: [id number] : 1945        Procedure : Procedure(s):  OPEN REDUCTION INTERNAL FIXATION RIGHT DISTAL FEMUR FRACTURE WITH RETROGRADE NAIL          Past Medical History:   Diagnosis Date    Dyslipidemia     Hypertension     Osteoarthritis       Past Surgical History:   Procedure Laterality Date    CATARACT EXTRACTION, BILATERAL  2023      Allergies   Allergen Reactions    Iodine-131 Angioedema, Hives and Itching      Social History     Tobacco Use    Smoking status: Never    Smokeless tobacco: Never   Substance Use Topics    Alcohol use: No      Wt Readings from Last 1 Encounters:   23 79.4 kg (175 lb)        Anesthesia Evaluation            ROS/MED HX  ENT/Pulmonary:  - neg pulmonary ROS     Neurologic:  - neg neurologic ROS     Cardiovascular:     (+) Dyslipidemia hypertension- Peripheral Vascular Disease-   -  - -                                 Previous cardiac testing   Echo: Date:  Results:  Left Ventricle  The left ventricle is normal in size. There is mild concentric left  ventricular hypertrophy. A sigmoid septum is present. The visual ejection  fraction is 60-65%. Grade I or early diastolic dysfunction.     Right Ventricle  The right ventricle is normal in structure, function and size.     Atria  There is mild biatrial enlargement.     Mitral Valve  There is mild mitral annular calcification. The mitral valve leaflets are  mildly thickened.     Tricuspid Valve  Normal tricuspid valve. Right ventricular systolic pressure is normal.     Aortic Valve  There is trivial trileaflet aortic sclerosis.     Pulmonic Valve  The pulmonic valve is not well visualized. There is trace to mild pulmonic  valvular regurgitation.     Vessels  Borderline aortic root dilatation. The ascending aorta is Mildly dilated. The  inferior vena cava is normal.     Pericardium  There is no pericardial effusion.     Rhythm  Sinus rhythm was  "noted.    Stress Test:  Date: Results:    ECG Reviewed:  Date: Results:    Cath:  Date: Results:      METS/Exercise Tolerance:     Hematologic:     (+)      anemia,          Musculoskeletal:   (+)  arthritis,   fracture, Fracture location: RLE,         GI/Hepatic:  - neg GI/hepatic ROS     Renal/Genitourinary:  - neg Renal ROS     Endo:  - neg endo ROS     Psychiatric/Substance Use:  - neg psychiatric ROS     Infectious Disease:  - neg infectious disease ROS     Malignancy:       Other:            Physical Exam    Airway        Mallampati: II   TM distance: > 3 FB   Neck ROM: full   Mouth opening: > 3 cm    Respiratory Devices and Support         Dental       (+) Modest Abnormalities - crowns, retainers, 1 or 2 missing teeth      Cardiovascular          Rhythm and rate: regular and normal     Pulmonary           breath sounds clear to auscultation           OUTSIDE LABS:  CBC:   Lab Results   Component Value Date    WBC 6.9 12/28/2023    WBC 10.6 12/27/2023    HGB 9.6 (L) 12/28/2023    HGB 10.6 (L) 12/27/2023    HCT 29.5 (L) 12/28/2023    HCT 32.2 (L) 12/27/2023     12/28/2023     12/27/2023     BMP:   Lab Results   Component Value Date     (L) 12/27/2023     12/04/2023    POTASSIUM 3.0 (L) 12/27/2023    POTASSIUM 3.6 12/04/2023    CHLORIDE 95 (L) 12/27/2023    CHLORIDE 102 12/04/2023    CO2 27 12/27/2023    CO2 28 12/04/2023    BUN 13.7 12/27/2023    BUN 23.8 (H) 12/04/2023    CR 0.64 12/27/2023    CR 0.81 12/04/2023     (H) 12/27/2023     (H) 12/04/2023     COAGS:   Lab Results   Component Value Date    PTT 30 12/27/2023    INR 1.09 12/27/2023     POC: No results found for: \"BGM\", \"HCG\", \"HCGS\"  HEPATIC: No results found for: \"ALBUMIN\", \"PROTTOTAL\", \"ALT\", \"AST\", \"GGT\", \"ALKPHOS\", \"BILITOTAL\", \"BILIDIRECT\", \"WILTON\"  OTHER:   Lab Results   Component Value Date    SARAH 8.8 12/27/2023       Anesthesia Plan    ASA Status:  2    NPO Status:  NPO Appropriate    Anesthesia Type: " "General.     - Airway: LMA   Induction: Intravenous.   Maintenance: Balanced.   Techniques and Equipment:       - Drips/Meds: Dexmed. infusion     Consents    Anesthesia Plan(s) and associated risks, benefits, and realistic alternatives discussed. Questions answered and patient/representative(s) expressed understanding.     - Discussed:     - Discussed with:  Patient      - Extended Intubation/Ventilatory Support Discussed: No.      - Patient is DNR/DNI Status: No     Use of blood products discussed: No .     Postoperative Care    Pain management: IV analgesics, Oral pain medications, Multi-modal analgesia.     - Plan for long acting post-op opioid use   PONV prophylaxis: Dexamethasone or Solumedrol, Ondansetron (or other 5HT-3)     Comments:    Other Comments:   Methadone 10 mg  Decadron 8 mg  Zofran 4 mg  Toradol 15 mg  Precedex  Sugammadex if paralytic to be used            Truong Nelson MD        # Hypokalemia: Lowest K = 3 mmol/L in last 2 days, will replace as needed  # Hyponatremia: Lowest Na = 132 mmol/L in last 30 days, will monitor as appropriate         # Drug Induced Platelet Defect: home medication list includes an antiplatelet medication  # Overweight: Estimated body mass index is 26.61 kg/m  as calculated from the following:    Height as of this encounter: 1.727 m (5' 8\").    Weight as of this encounter: 79.4 kg (175 lb).      "

## 2023-12-28 NOTE — ED PROVIDER NOTES
This 78 year old female patient was under my care during my shift from 1400 to time of admission on 12/27/23.  She is in the emergency department for a distal femur fracture.  I did not meet with the patient nor did she require any interventions outside of Dilaudid for pain. I also made her NPO in the case she has surgery later today.           Karol You MD  12/27/23 9567

## 2023-12-28 NOTE — PROGRESS NOTES
"[facility]    Medicine Progress Note - Hospitalist Service    Date of Admission:  12/27/2023    Assessment & Plan     Mercedes Nelson is a 78 year old female admitted on 12/27/2023. She has a past medical history significant for hypertension, hyperlipidemia, osteoarthritis, and recent total knee replacement.  She presented to the emergency room today with right knee pain after fall.  Found to have distal right femur fracture.     Distal right femur fracture.  Mechanical fall.  -Orthopedic surgery consult appreciated.  -Underwent repair in operating room with orthopedic surgery on 12/28.  -Pain medications as needed.     Hypertension.  -Continue atenolol.  -Hold lisinopril.  -IV hydralazine if needed.     Hyperlipidemia.  -Continue rosuvastatin.    Hypokalemia.  -Potassium replacement protocol.     Hyponatremia.  -Mild.  Sodium 131.  -Continue IV fluids.  -Recheck metabolic panel tomorrow.    Acute blood loss anemia.  -Hemoglobin mildly decreased 8.4 today.  -Recheck hemoglobin tomorrow.  -Check iron levels.    Drug-induced platelet defect.  -On aspirin prior to admission due to previous surgery.  -Orthopedic surgery planning to switch to apixaban for DVT prophylaxis starting 12/29.        Diet: Advance Diet as Tolerated: Regular Diet Adult    DVT Prophylaxis: Pneumatic Compression Devices  Johnson Catheter: PRESENT, indication: Retention  Lines: None     Cardiac Monitoring: None  Code Status: Full Code      Clinically Significant Risk Factors        # Hypokalemia: Lowest K = 3 mmol/L in last 2 days, will replace as needed                  # Overweight: Estimated body mass index is 26.61 kg/m  as calculated from the following:    Height as of this encounter: 1.727 m (5' 8\").    Weight as of this encounter: 79.4 kg (175 lb)., PRESENT ON ADMISSION            Disposition Plan     Expected Discharge Date: 12/29/2023                    Harry Mcdonald, DO  Hospitalist Service  [facility] " [facility]  Securely message with [employer] (more info)  Text page via [facility] Paging/Directory   ______________________________________________________________________    Interval History   Denies chest pain, shortness of breath, fevers, chills, nausea, vomiting.    Physical Exam   Vital Signs: Temp: 97.2  F (36.2  C) Temp src: Temporal BP: 107/55 Pulse: 65   Resp: 11 SpO2: 94 % O2 Device: Nasal cannula Oxygen Delivery: 2 LPM  Weight: 175 lbs 0 oz    Gen:  NAD, A&Ox3.  Eyes:  PERRL, sclera anicteric.  OP:  MMM, no lesions.  Neck:  Supple.  CV:  Regular, no murmurs.  Lung:  CTA b/l, normal effort.  Ab:  +BS, soft.  Skin:  Warm, dry to touch.  No rash.  Postoperative dressing not removed.  Ext:  No pitting edema LE b/l.      Medical Decision Making       38 MINUTES SPENT BY ME on the date of service doing chart review, history, exam, documentation & further activities per the note.      Data     I have personally reviewed the following data over the past 24 hrs:    6.9  \   8.4 (L)   / 218     131 (L) 97 (L) 17.8 /  114 (H)   3.2 (L) 26 0.65 \     INR:  N/A PTT:  N/A   D-dimer:  N/A Fibrinogen:  N/A       Imaging results reviewed over the past 24 hrs:   Recent Results (from the past 24 hour(s))   XR Surgery AVTAR L/T 5 Min Fluoro w Stills    Narrative    This exam was marked as non-reportable because it will not be read by a   radiologist or a [place] non-radiologist provider.         XR Femur Port Right 2 Views    Narrative    XR FEMUR PORT RIGHT 2 VIEWS 12/28/2023 11:52 AM     HISTORY: retrograde femur    COMPARISON: 12/27/2023      Impression    IMPRESSION: Intramedullary nail, screw and cerclage wire fixation  across the comminuted distal femoral fracture have been placed.  Postoperative air is present. Skin staples are in place. The hardware  is intact. TKA with patellar resurfacing.    NEYDA ANTHONY MD         SYSTEM ID:  [id number]

## 2023-12-28 NOTE — ANESTHESIA POSTPROCEDURE EVALUATION
Patient: Mercedes Nelson    Procedure: Procedure(s):  OPEN REDUCTION INTERNAL FIXATION RIGHT DISTAL FEMUR FRACTURE WITH RETROGRADE NAIL       Anesthesia Type:  General    Note:  Disposition: Inpatient   Postop Pain Control: Uneventful            Sign Out: Well controlled pain   PONV: No   Neuro/Psych: Uneventful            Sign Out: Acceptable/Baseline neuro status   Airway/Respiratory: Uneventful            Sign Out: Acceptable/Baseline resp. status   CV/Hemodynamics: Uneventful            Sign Out: Acceptable CV status   Other NRE: NONE   DID A NON-ROUTINE EVENT OCCUR? No           Last vitals:  Vitals Value Taken Time   /63 12/28/23 1203   Temp 97  F (36.1  C) 12/28/23 1200   Pulse 59 12/28/23 1209   Resp 15 12/28/23 1209   SpO2 93 % 12/28/23 1211   Vitals shown include unfiled device data.    Electronically Signed By: Truong Nelson MD  December 28, 2023  3:54 PM

## 2023-12-28 NOTE — OP NOTE
[facility]  Operative Note  Cephalomedullary Nailing      Mercedes Nelson MRN# [id number]   YOB: 1945  Procedure Date:  12/28/2023  Age: 78 year old     PREOPERATIVE DIAGNOSIS:     1.  Displaced Right distal periprosthetic femur fracture in a recent Right TKA       POSTOPERATIVE DIAGNOSES:     1.  Same       PROCEDURE PERFORMED:  Open reduction and internal fixation of comminuted right periprosthetic distal femur fracture with a retrograde nail.     SURGEON:  SMITH SILVA MD      ASSISTANT:  who was critical for positioning patient, helping obtain reduction, retraction during exposure and implant placement, as well as closure.     ANESTHESIA:  General.      ESTIMATED BLOOD LOSS:  ~300 mL      IMPLANTS USED:  Implants:   Implant Name Type Inv. Item Serial No.  Lot No. LRB No. Used Action   Retrograde Femoral Nail, 12mm x 380mm, 10 degree bend    Interleukin [employer]etics [id number] Right 1 Implanted   SCREW BN 86MM 5MM LCK LOPRFL X25 STRL IM NL 04.045.386S - [id number] Metallic Hardware/[place] SCREW BN 86MM 5MM LCK LOPRFL X25 STRL IM NL 04.045.386S  Interleukin Genetics-STRATEC [id number] Right 1 Implanted   LOW PROFILE Locking Screw, 5.0mmx 56mm    SYNTHES-STRATEC [id number] Right 1 Implanted   LOW PROFILE locking screw, 5.0mm x 70mm    SYNTHES[employer]STRATEC [id number] Right 1 Implanted   LOW PROFILE locking screw, 5.0mm x 84mm    SYNTHES-STRATEC [id number] Right 1 Implanted   SCREW BN 38MM 5MM LCK X25 IM NL 04.045.038S - [id number] Metallic Hardware/[place] SCREW BN 38MM 5MM LCK X25 IM NL 04.045.038S  Interleukin Genetics-STRATEC [id number] Right 1 Implanted   IMP CABLE SYN ORTHO COCR W/CRIMP [id number] 611.105.01S - [id number] Metallic Hardware/[place] IMP CABLE SYN ORTHO COCR W/CRIMP [id number] 611.105.01S  Interleukin Genetics-STRATEC [id number] Right 1 Implanted   ENDCAP 0MM RFN-ADV STRL 04.233.000S - [id number] Metallic Hardware/[place] ENDCAP 0MM RFN-ADV STRL 04.233.000S  Interleukin Genetics-STRATEC [id number] Right 1 Implanted        FINDINGS:  Mercedes Nelson is a 78 year  old-year-old female who sustained the above injury after a mechanical ground level fall.  A long discussion had regarding the nature of hip fractures, risks related to that and surgery discussed, included but not limited to bleeding, infection, damage to surrounding neurovascular structures, malunion, delayed union, nonunion, need for additional surgeries, blood clots that go to the heart, lung or brain, anesthetic complications or even death.   The patient and the family wished to proceed and consent signed.      DESCRIPTION OF PROCEDURE:  The patient identified in the preoperative holding area per hospital policy and the correct operative site was marked.  General anesthesia induced, placed onto the radiolucent table. All bony prominences well padded. Chlorhexidine pre-scrub, ChloraPrep, standard drape. Prior to incision, a critical pause was observed to identify the correct patient, correct procedure, site and side of surgery, implant availability and that appropriate imaging studies were available. I also confirmed that the patient received appropriate pre-operative prophylactic antibiotics.All in the room agreed to proceed.       We made an incision inferior to the patella utilizing the recent incision.  The incision was from the inferior patella to about the tibial tubercle.  This was site dissected down to the patella tendon.  The patella tendon was split in line with its fibers.  This was then dissected down to the box of the total knee arthroplasty.  Once this was performed we placed our guidewire in the usual standard fashion and ensured that the start point was not to posterior.  Once this was performed we were able to place our guidewire down the femur.  Reduction was performed with closed means however due to the deforming forces and displacement of the distal fragment an incision was made laterally and a cable was placed for improved alignment.  Once this was performed we then placed our nail in the  usual standard fashion.  All the interlocks were utilized distally.  And an interlock screw was placed proximally in a safe zone.  The wounds were then thoroughly irrigated and closed in the usual standard fashion.  The patient was woken up from anesthesia in good condition, all counts at the end of  case were correct.      POSTOPERATIVE PLAN:   1.  Non Weightbearing as tolerated with a walker for 3 weeks followed by gradual increase in weight bearing. May slowly start ROM after soft tissue rest for couple days.  2.  Deep venous thrombosis prophylaxis   3.  Ancef x 24 hours  4.  Osteoporosis workup including calcium, vitamin D supplementation and followup with primary care doctor for additional treatment.   5.  Physical therapy.   6.  Pain control.  Minimize narcotics.      POSTOPERATIVE PLAN:  At two weeks, the staples can be removed. If all is going well, first followup could be 6 weeks with Dr. Kimball [facility], with 2 views of the right femur, sooner if there is any difficulty.      SMITH KIMBALL MD

## 2023-12-28 NOTE — ANESTHESIA CARE TRANSFER NOTE
Patient: Mercedes Nelson    Procedure: Procedure(s):  OPEN REDUCTION INTERNAL FIXATION RIGHT DISTAL FEMUR FRACTURE WITH RETROGRADE NAIL       Diagnosis: Closed fracture of distal end of right femur, unspecified fracture morphology, initial encounter (H) [S72.401A]  Diagnosis Additional Information: No value filed.    Anesthesia Type:   General     Note:    Oropharynx: oropharynx clear of all foreign objects and spontaneously breathing  Level of Consciousness: drowsy  Oxygen Supplementation: face mask  Level of Supplemental Oxygen (L/min / FiO2): 10  Independent Airway: airway patency satisfactory and stable  Dentition: dentition unchanged  Vital Signs Stable: post-procedure vital signs reviewed and stable  Report to RN Given: handoff report given  Patient transferred to: PACU    Handoff Report: Identifed the Patient, Identified the Reponsible Provider, Reviewed the pertinent medical history, Discussed the surgical course, Reviewed Intra-OP anesthesia mangement and issues during anesthesia, Set expectations for post-procedure period and Allowed opportunity for questions and acknowledgement of understanding      Vitals:  Vitals Value Taken Time   BP     Temp     Pulse     Resp 12 12/28/23 1105   SpO2 96 % 12/28/23 1105   Vitals shown include unfiled device data.    Electronically Signed By: LYNDSAY Moffett CRNA  December 28, 2023  11:07 AM

## 2023-12-28 NOTE — PLAN OF CARE
"Goal Outcome Evaluation:    Overall Patient Progress: no changeOverall Patient Progress: no change     Aox4. VSS on RA. Denies sob and nausea. LS clear. BS normoactive. Edema on right knee +3 noted. Bedrest. Ax2. NS@ 75. NPO after midnight. Tolerating IV Dilaudid and Tylenol for pain.    Plan: ORIF @0730 today.     /73 (BP Location: Right arm)   Pulse 59   Temp 96.8  F (36  C) (Temporal)   Resp 18   Ht 1.727 m (5' 8\")   Wt 79.4 kg (175 lb)   SpO2 93%   BMI 26.61 kg/m              "

## 2023-12-28 NOTE — PLAN OF CARE
Goal Outcome Evaluation:      Plan of Care Reviewed With: patient    Overall Patient Progress: no changeOverall Patient Progress: no change     Patient vital signs are at baseline: Yes  Patient able to ambulate as they were prior to admission or with assist devices provided by therapies during their stay:  No,  Reason:  bedrest d/t hip fx  Patient MUST void prior to discharge:  No,  Reason:  DTV, purewick in place d/t bedrest  Patient able to tolerate oral intake:  Yes  Pain has adequate pain control using Oral analgesics:  Yes  Does patient have an identified :  Yes  Has goal D/C date and time been discussed with patient:  No,  Reason:  surgery tomorrow for hip fx

## 2023-12-29 ENCOUNTER — APPOINTMENT (OUTPATIENT)
Dept: PHYSICAL THERAPY | Facility: CLINIC | Age: 78
DRG: 480 | End: 2023-12-29
Attending: STUDENT IN AN ORGANIZED HEALTH CARE EDUCATION/TRAINING PROGRAM
Payer: COMMERCIAL

## 2023-12-29 ENCOUNTER — APPOINTMENT (OUTPATIENT)
Dept: GENERAL RADIOLOGY | Facility: CLINIC | Age: 78
DRG: 480 | End: 2023-12-29
Attending: INTERNAL MEDICINE
Payer: COMMERCIAL

## 2023-12-29 LAB
ANION GAP SERPL CALCULATED.3IONS-SCNC: 6 MMOL/L (ref 7–15)
BLD PROD TYP BPU: NORMAL
BLOOD COMPONENT TYPE: NORMAL
BUN SERPL-MCNC: 12.9 MG/DL (ref 8–23)
CALCIUM SERPL-MCNC: 7.9 MG/DL (ref 8.8–10.2)
CHLORIDE SERPL-SCNC: 99 MMOL/L (ref 98–107)
CODING SYSTEM: NORMAL
CREAT SERPL-MCNC: 0.59 MG/DL (ref 0.51–0.95)
CROSSMATCH: NORMAL
DEPRECATED HCO3 PLAS-SCNC: 26 MMOL/L (ref 22–29)
EGFRCR SERPLBLD CKD-EPI 2021: >90 ML/MIN/1.73M2
GLUCOSE SERPL-MCNC: 112 MG/DL (ref 70–99)
HGB BLD-MCNC: 6.8 G/DL (ref 11.7–15.7)
HGB BLD-MCNC: 7.1 G/DL (ref 11.7–15.7)
IRON BINDING CAPACITY (ROCHE): 195 UG/DL (ref 240–430)
IRON SATN MFR SERPL: 13 % (ref 15–46)
IRON SERPL-MCNC: 26 UG/DL (ref 37–145)
ISSUE DATE AND TIME: NORMAL
MAGNESIUM SERPL-MCNC: 2.2 MG/DL (ref 1.7–2.3)
POTASSIUM SERPL-SCNC: 3.7 MMOL/L (ref 3.4–5.3)
SODIUM SERPL-SCNC: 131 MMOL/L (ref 135–145)
UNIT ABO/RH: NORMAL
UNIT NUMBER: NORMAL
UNIT STATUS: NORMAL
UNIT TYPE ISBT: 6200

## 2023-12-29 PROCEDURE — 83550 IRON BINDING TEST: CPT | Performed by: INTERNAL MEDICINE

## 2023-12-29 PROCEDURE — 97161 PT EVAL LOW COMPLEX 20 MIN: CPT | Mod: GP

## 2023-12-29 PROCEDURE — 36415 COLL VENOUS BLD VENIPUNCTURE: CPT | Performed by: INTERNAL MEDICINE

## 2023-12-29 PROCEDURE — 85018 HEMOGLOBIN: CPT | Performed by: STUDENT IN AN ORGANIZED HEALTH CARE EDUCATION/TRAINING PROGRAM

## 2023-12-29 PROCEDURE — 99232 SBSQ HOSP IP/OBS MODERATE 35: CPT | Performed by: INTERNAL MEDICINE

## 2023-12-29 PROCEDURE — 71045 X-RAY EXAM CHEST 1 VIEW: CPT

## 2023-12-29 PROCEDURE — 83735 ASSAY OF MAGNESIUM: CPT | Performed by: INTERNAL MEDICINE

## 2023-12-29 PROCEDURE — 258N000003 HC RX IP 258 OP 636: Performed by: INTERNAL MEDICINE

## 2023-12-29 PROCEDURE — 250N000013 HC RX MED GY IP 250 OP 250 PS 637: Performed by: INTERNAL MEDICINE

## 2023-12-29 PROCEDURE — P9016 RBC LEUKOCYTES REDUCED: HCPCS | Performed by: INTERNAL MEDICINE

## 2023-12-29 PROCEDURE — 250N000011 HC RX IP 250 OP 636: Performed by: INTERNAL MEDICINE

## 2023-12-29 PROCEDURE — 85018 HEMOGLOBIN: CPT | Performed by: INTERNAL MEDICINE

## 2023-12-29 PROCEDURE — 120N000001 HC R&B MED SURG/OB

## 2023-12-29 PROCEDURE — 36415 COLL VENOUS BLD VENIPUNCTURE: CPT | Performed by: STUDENT IN AN ORGANIZED HEALTH CARE EDUCATION/TRAINING PROGRAM

## 2023-12-29 PROCEDURE — 80048 BASIC METABOLIC PNL TOTAL CA: CPT | Performed by: INTERNAL MEDICINE

## 2023-12-29 PROCEDURE — 97530 THERAPEUTIC ACTIVITIES: CPT | Mod: GP

## 2023-12-29 PROCEDURE — 250N000013 HC RX MED GY IP 250 OP 250 PS 637: Performed by: STUDENT IN AN ORGANIZED HEALTH CARE EDUCATION/TRAINING PROGRAM

## 2023-12-29 RX ORDER — DIPHENHYDRAMINE HYDROCHLORIDE 50 MG/ML
50 INJECTION INTRAMUSCULAR; INTRAVENOUS
Status: DISCONTINUED | OUTPATIENT
Start: 2023-12-29 | End: 2023-12-31

## 2023-12-29 RX ORDER — LISINOPRIL 2.5 MG/1
2.5 TABLET ORAL DAILY
Status: DISCONTINUED | OUTPATIENT
Start: 2023-12-29 | End: 2023-12-31

## 2023-12-29 RX ORDER — METHYLPREDNISOLONE SODIUM SUCCINATE 125 MG/2ML
125 INJECTION, POWDER, LYOPHILIZED, FOR SOLUTION INTRAMUSCULAR; INTRAVENOUS
Status: DISCONTINUED | OUTPATIENT
Start: 2023-12-29 | End: 2023-12-31

## 2023-12-29 RX ADMIN — HYDROXYZINE HYDROCHLORIDE 25 MG: 25 TABLET, FILM COATED ORAL at 12:18

## 2023-12-29 RX ADMIN — ATENOLOL 100 MG: 50 TABLET ORAL at 21:22

## 2023-12-29 RX ADMIN — ACETAMINOPHEN 975 MG: 325 TABLET, FILM COATED ORAL at 09:11

## 2023-12-29 RX ADMIN — APIXABAN 2.5 MG: 2.5 TABLET, FILM COATED ORAL at 09:11

## 2023-12-29 RX ADMIN — LISINOPRIL 2.5 MG: 2.5 TABLET ORAL at 16:51

## 2023-12-29 RX ADMIN — ACETAMINOPHEN 975 MG: 325 TABLET, FILM COATED ORAL at 16:51

## 2023-12-29 RX ADMIN — SENNOSIDES AND DOCUSATE SODIUM 1 TABLET: 8.6; 5 TABLET ORAL at 09:11

## 2023-12-29 RX ADMIN — IRON SUCROSE 300 MG: 20 INJECTION, SOLUTION INTRAVENOUS at 11:33

## 2023-12-29 RX ADMIN — ROSUVASTATIN CALCIUM 20 MG: 20 TABLET, FILM COATED ORAL at 21:21

## 2023-12-29 RX ADMIN — POLYETHYLENE GLYCOL 3350 17 G: 17 POWDER, FOR SOLUTION ORAL at 09:12

## 2023-12-29 RX ADMIN — APIXABAN 2.5 MG: 2.5 TABLET, FILM COATED ORAL at 21:22

## 2023-12-29 RX ADMIN — SENNOSIDES AND DOCUSATE SODIUM 1 TABLET: 8.6; 5 TABLET ORAL at 21:21

## 2023-12-29 RX ADMIN — OXYCODONE HYDROCHLORIDE 5 MG: 5 TABLET ORAL at 21:24

## 2023-12-29 ASSESSMENT — ACTIVITIES OF DAILY LIVING (ADL)
ADLS_ACUITY_SCORE: 26
ADLS_ACUITY_SCORE: 26
ADLS_ACUITY_SCORE: 25
ADLS_ACUITY_SCORE: 26
ADLS_ACUITY_SCORE: 25
ADLS_ACUITY_SCORE: 26

## 2023-12-29 NOTE — PROVIDER NOTIFICATION
DATE/TIME OF CALL RECEIVED FROM LAB:  12/29/23 at 1630  LAB TEST:  Hemoglobin  LAB VALUE:  6.8  PROVIDER NOTIFIED?: Yes  PROVIDER NAME:  Tamara  DATE/TIME LAB VALUE REPORTED TO PROVIDER: 1630  MECHANISM OF PROVIDER NOTIFICATION: Page  PROVIDER RESPONSE: Yes

## 2023-12-29 NOTE — PLAN OF CARE
Goal Outcome Evaluation:      Plan of Care Reviewed With: patient, spouse    Overall Patient Progress: improving    Patient vital signs are at baseline: Yes  Patient able to ambulate as they were prior to admission or with assist devices provided by therapies during their stay:  No,  Reason:  not oob yet  Patient MUST void prior to discharge:  No,  Reason:  Johnson, to be removed POD #1  Patient able to tolerate oral intake:  Yes  Pain has adequate pain control using Oral analgesics:  Yes  Does patient have an identified :  Yes    A & Ox4, able to make needs known. Tolerating diet, denies nausea. Johnson intact, draining clear urine. Order to remove Johnson POD #1. CMS intact, denies numbness/tingling.

## 2023-12-29 NOTE — PROGRESS NOTES
Notified provider about indwelling valencia catheter discussed removal or continued need.    Did provider choose to remove indwelling valencia catheter? NO    Provider's valencia indication for keeping indwelling valencia catheter: Indication for continued use: Retention    Is there an order for indwelling valencia catheter? YES    Order to remove POD #1    *If there is a plan to keep valencia catheter in place at discharge daily notification with provider is not necessary, but please add a notation in the treatment team sticky note that the patient will be discharging with the catheter.

## 2023-12-29 NOTE — PROGRESS NOTES
"   12/29/23 1514   Appointment Info   Signing Clinician's Name / Credentials (PT) Tootie Montenegro DPT   Rehab Comments (PT) NWB right LE, brace   Living Environment   People in Home spouse   Current Living Arrangements house   Living Environment Comments Patient reports living in a 2 story home with 2 steps to enter in garage.  All bedrooms on 2nd level.  Powder bath on first level.   Self-Care   Usual Activity Tolerance good   Current Activity Tolerance poor   Equipment Currently Used at Home walker, rolling   Fall history within last six months yes   Number of times patient has fallen within last six months 1   Activity/Exercise/Self-Care Comment Patient had right TKA 3 weeks ago.  Since then, has been working on strengthening and ROM.  Reports that she was still using a walker, and that ROM was just starting to \"get better.\"   General Information   Onset of Illness/Injury or Date of Surgery 12/27/23   Referring Physician Hal Kimball MD   Patient/Family Therapy Goals Statement (PT) Agreeable to TCU   Pertinent History of Current Problem (include personal factors and/or comorbidities that impact the POC) Per medical chart: Mercedes Nelson is a 78 year old female admitted on 12/27/2023. She has a past medical history significant for hypertension, hyperlipidemia, osteoarthritis, and recent total knee replacement.  She presented to the emergency room today with right knee pain after fall.  Found to have distal right femur fracture. POD#1 s/p Right ORIF, right distal femur fracture with retrograde nail.   Existing Precautions/Restrictions fall;weight bearing;oxygen therapy device and L/min  (1L NC)   Weight-Bearing Status - RLE nonweight-bearing  (brace, ROM between 0 degrees extension, flexion as tolerated)   Cognition   Orientation Status (Cognition) oriented x 4   Pain Assessment   Patient Currently in Pain Yes, see Vital Sign flowsheet  (with mobility)   Integumentary/Edema   Integumentary/Edema Comments brace " at right LE   Posture    Posture Forward head position;Protracted shoulders   Range of Motion (ROM)   ROM Comment Did not assess   Strength (Manual Muscle Testing)   Strength (Manual Muscle Testing) Deficits observed during functional mobility   Strength Comments Unable to perform SLR at right LE   Bed Mobility   Comment, (Bed Mobility) Required mod A at right LE   Transfers   Transfers sit-stand transfer   Sit-Stand Transfer   Sit-Stand [place] (Transfers) moderate assist (50% patient effort)   Assistive Device (Sit-Stand Transfers) walker, front-wheeled   Comment, (Sit-Stand Transfer) Required raised bed height.  Patient unable to maintain NWB at right LE during transfer even with mod A, direct and constant verbal cueing   Gait/Stairs (Locomotion)   Comment, (Gait/Stairs) Pivot transfer only, with min A and direct and constant verbal cueing for NWB at right LE   Balance   Balance Comments Patient iwth history of fall, now NWB at right LE, requires 2WW, limited ability to maintain NWB, remains high fall risk   Clinical Impression   Criteria for Skilled Therapeutic Intervention Yes, treatment indicated   PT Diagnosis (PT) Impaired functional mobility   Influenced by the following impairments NWB, decreased strength, decreased activity tolerance, decreased balance   Functional limitations due to impairments difficulties wtih bed mobility, transfers and unable to ambulate   Clinical Presentation (PT Evaluation Complexity) stable   Clinical Presentation Rationale clinical jdugement   Clinical Decision Making (Complexity) low complexity   Planned Therapy Interventions (PT) balance training;bed mobility training;gait training;patient/family education;ROM (range of motion);stair training;strengthening;transfer training;progressive activity/exercise   Risk & Benefits of therapy have been explained evaluation/treatment results reviewed;care plan/treatment goals reviewed;risks/benefits reviewed;current/potential  barriers reviewed;participants voiced agreement with care plan;participants included;patient   PT Total Evaluation Time   PT Eval, Low Complexity Minutes (64284) 10   Physical Therapy Goals   PT Frequency Daily   PT Predicted Duration/Target Date for Goal Attainment 01/05/24   PT Goals Bed Mobility;Transfers;Gait;Stairs   PT: Bed Mobility Supervision/stand-by assist;Supine to/from sit;Within precautions   PT: Transfers Supervision/stand-by assist;Sit to/from stand;Bed to/from chair;Assistive device;Within precautions   PT: Gait Supervision/stand-by assist;Assistive device;50 feet;Within precautions   PT: Stairs Supervision/stand-by assist;Assistive device;Within precautions;2 stairs   Interventions   Interventions Quick Adds Therapeutic Activity   Therapeutic Activity   Therapeutic Activities: dynamic activities to improve functional performance Minutes (12460) 25   Symptoms Noted During/After Treatment Increased pain   Treatment Detail/Skilled Intervention Patient supine in bed upon arrival.  Education provided regarding NWB at right LE.  Brace on right LE, tightened prior to mobility.  Facilitated transfer to sitting at EOB with mod A provided at right LE.  Facilitated transfer between sitting and standing with 2WW and mod A, raised bed height.  Patient unable to maintain NWB at right LE during transfer even with mod A, direct and constant verbal cueing.  Facilitated pivot transfer to bedside commode with min A, verbal cueing for NWB.  Direct and constant cueing for safe technique to maintain NWB at right LE during transfer to sitting.  Max A to transfer to standing with 2WW from commode with patient unable to maintain NWB at right LE during transfer even with high level of assist and direct/constant cueing for NWB.  Dependent for sofia cares following BM.  Unsafe to trial amb to bedside chair as patient unable to maintain NWB.  Facilitated pivot transfer to bed; again with direct and constant cueing during  transfer.  Min A at right LE to return to supine.  Patient able to reposition/scoot independently in bed.  All needs within reach, bed alarm on.   PT Discharge Planning   PT Plan transfers with NWB, trial amb if appropriate   PT Discharge Recommendation (DC Rec) Transitional Care Facility   PT Rationale for DC Rec Patient presents below baseline for functional mobility.  Patient unable to maintain NWB during transfers consistently, unable to ambulate at this time.  House is multi level and has stairs to enter.  Recommend discharge to TCU in order to increase strength, activity tolerance and independence with mobility.   PT Brief overview of current status Ax1-2 for pivot only, limited ability to maintain NWB at right LE   Total Session Time   Timed Code Treatment Minutes 25   Total Session Time (sum of timed and untimed services) 35

## 2023-12-29 NOTE — PROGRESS NOTES
[facility] [place]    Medicine Progress Note - Hospitalist Service    Date of Admission:  12/27/2023    Assessment & Plan   Mercedes Nelson is a 78 year old female admitted on 12/27/2023. She has a past medical history significant for hypertension, hyperlipidemia, osteoarthritis, and recent total knee replacement.  She presented to the emergency room today with right knee pain after fall.  Found to have distal right femur fracture.     Distal right femur fracture.  Mechanical fall.  -Orthopedic surgery consult appreciated.  -Underwent repair in operating room with orthopedic surgery on 12/28.  -Pain medications as needed.  -Use incentive spirometry.  -Work with therapy.     Hypertension.  -Continue atenolol.  -Restart lisinopril at 2.5 mg a day.  -IV hydralazine if needed.     Hyperlipidemia.  -Continue rosuvastatin.     Hypokalemia.  -Potassium replacement protocol.      Hyponatremia.  -Mild.  Sodium 131.  -Stop IV fluids.  -Recheck metabolic panel tomorrow.     Acute blood loss anemia.  -Postoperative.  -Hemoglobin mildly decreased to 7.1 today.  -Iron levels low.  -Iron sucrose 300 mg IV once today, 12/29.  -Recheck hemoglobin tomorrow.     Drug-induced platelet defect.  Drug-induced coagulation defect.  -On aspirin prior to admission due to previous surgery.  -Orthopedic surgery now starting apixaban 2.5 mg twice a day for DVT prophylaxis on 12/29.    Acute hypoxic respiratory failure.  -CT scan on 12/29 with no obvious acute pathology.  -Suspect secondary to atelectasis.  -Significant anemia likely contributing.  -Use incentive spirometry.  -Supplemental oxygen as needed.  -IV iron sucrose as above.    Daughter and son updated at bedside on 12/29.        Diet: Advance Diet as Tolerated: Regular Diet Adult    DVT Prophylaxis: DOAC  Johnson Catheter: PRESENT, indication: Retention  Lines: None     Cardiac Monitoring: None  Code Status: Full Code      Clinically Significant Risk Factors        #  "Hypokalemia: Lowest K = 3.2 mmol/L in last 2 days, will replace as needed                  # Overweight: Estimated body mass index is 26.61 kg/m  as calculated from the following:    Height as of this encounter: 1.727 m (5' 8\").    Weight as of this encounter: 79.4 kg (175 lb)., PRESENT ON ADMISSION            Disposition Plan      Expected Discharge Date: 12/29/2023                    Harry Mcdonald, DO  Hospitalist Service  [facility]  Securely message with [employer] (more info)  Text page via [facility] Paging/Directory   ______________________________________________________________________    Interval History   Does not feel short of breath.  No cough.  Denies chest pain, fevers, chills, nausea, vomiting.    Physical Exam   Vital Signs: Temp: 100.1  F (37.8  C) Temp src: Temporal BP: 119/55 Pulse: 75   Resp: 17 SpO2: 94 % O2 Device: Nasal cannula Oxygen Delivery: 1 LPM  Weight: 175 lbs 0 oz    Gen:  NAD, A&Ox3.  Eyes:  PERRL, sclera anicteric.  OP:  MMM, no lesions.  Neck:  Supple.  CV:  Regular, no murmurs.  Lung:  CTA b/l, normal effort.  Ab:  +BS, soft.  Skin:  Warm, dry to touch.  No rash.  Ext:  No pitting edema LE b/l.      Medical Decision Making       45 MINUTES SPENT BY ME on the date of service doing chart review, history, exam, documentation & further activities per the note.      Data     I have personally reviewed the following data over the past 24 hrs:    N/A  \   7.1 (L)   / N/A     131 (L) 99 12.9 /  112 (H)   3.7 26 0.59 \       Imaging results reviewed over the past 24 hrs:   Recent Results (from the past 24 hour(s))   XR Chest Port 1 View    Narrative    CHEST PORTABLE ONE VIEW  December 29, 2023 9:44 AM     HISTORY: Hypoxia.    COMPARISON: None.      Impression    IMPRESSION: Cardiomegaly. No focal airspace disease. No pulmonary  edema pattern identified at this time.    JAYLAN ARCE MD         SYSTEM ID:  [id number]     "

## 2023-12-29 NOTE — PLAN OF CARE
Patient vital signs are at baseline: Yes, low grade temp 100  Patient able to ambulate as they were prior to admission or with assist devices provided by therapies during their stay:  No, NWB to RLE, A2, pivot to BSC  Patient MUST void prior to discharge:  No,  valencia removed at 1600, DTV  Patient able to tolerate oral intake:  Yes  Pain has adequate pain control using Oral analgesics:  Yes, tylenol, atarax   Does patient have an identified :  Yes  Has goal D/C date and time been discussed with patient:  No,  Reason:  hemoglobin stable, not medically ready for discharge   Goal Outcome Evaluation:       Pt A&Ox4. Surgical dressing CDI. CMS Intact, ACE wrap, KI in place. Reporting 2/10 pain. Pain well managed with tylenol. Worked with PT. Hemoglobin 7.1, recheck 6.8. new order to transfuse blood. Received iron transfusion at 1133 with no reaction. Low grade temp that went up to 100.3. averaging in 99.2. MD aware/ Started 1 unit blood transfusion at 1900.

## 2023-12-29 NOTE — PROGRESS NOTES
Orthopedic Surgery  Mercedes Nelson  12/29/2023     Admit Date:  12/27/2023    POD: 1 Day Post-Op   Procedure(s):  Open reduction internal fixation, right distal femur fracture with retrograde nail     Patient resting comfortably in bed. Family at bedside.   Pain controlled, local anesthesia appears to remain effective today. We discussed anticipation of increased pain coming, and pain regimen post operatively.   Denies nausea or vomiting  Denies chest pain or shortness of breath  No new orthopedic complaints today.     Temp:  [96.5  F (35.8  C)-99.1  F (37.3  C)] 97.7  F (36.5  C)  Pulse:  [57-76] 75  Resp:  [10-21] 17  BP: (104-161)/(52-80) 110/58  SpO2:  [83 %-100 %] 93 %    Alert and oriented  Dressing is clean, dry, and intact. Proximal thigh through distal foot, no saturations. KI in place.   Minimal erythema of the surrounding skin.   Bilateral calves are soft, non-tender.  Right lower extremity is NVI.  Sensation intact bilateral lower extremities  Patient able to resist dorsi and plantar flexion bilaterally  +Dp pulse    Labs:  Recent Labs   Lab Test 12/29/23  0836 12/28/23  1204 12/28/23  0600 12/27/23  1427 12/04/23  2248   WBC  --   --  6.9 10.6 6.0   HGB 7.1* 8.4* 9.6* 10.6* 13.0   PLT  --   --  218 256 197     Recent Labs   Lab Test 12/27/23  1427   INR 1.09           1. PLAN:   Eliquis 2.5 mg bid for DVT prophylaxis.     Mobilize with PT/OT    NWB RLE. Non Weightbearing as tolerated with a walker for 3 weeks followed by gradual increase in weight bearing   Continue current pain regimen   Dressings: Keep intact.  Change if >60% saturated or peeling off.    Follow-up: 2 weeks post-op with Dr. Kimball     2. Disposition   Anticipate d/c to TCU when medically cleared and progressing in PT.    POSTOPERATIVE PLAN:  At two weeks, the staples can be removed. If all is going well, first followup could be 6 weeks with Dr. Kimball [facility], with 2 views of the right femur, sooner if there is any  difficulty.     Nicolasa Pete PA-C

## 2023-12-29 NOTE — PLAN OF CARE
Pt denies pain, scheduled tylenol. No PRNs overnight. CMS intact. Dressing CDI with immobilizer in place. IVF @ 125. 94-96%/2L overnight. Plan to wean O2 today. Johnson placed in PACU for retention. Will address today removal plan/voiding trial. Post op HgB 8.4- recheck this am. Uneventful night. Slept well.

## 2023-12-30 ENCOUNTER — APPOINTMENT (OUTPATIENT)
Dept: PHYSICAL THERAPY | Facility: CLINIC | Age: 78
DRG: 480 | End: 2023-12-30
Payer: COMMERCIAL

## 2023-12-30 ENCOUNTER — APPOINTMENT (OUTPATIENT)
Dept: OCCUPATIONAL THERAPY | Facility: CLINIC | Age: 78
DRG: 480 | End: 2023-12-30
Attending: STUDENT IN AN ORGANIZED HEALTH CARE EDUCATION/TRAINING PROGRAM
Payer: COMMERCIAL

## 2023-12-30 LAB
ANION GAP SERPL CALCULATED.3IONS-SCNC: 8 MMOL/L (ref 7–15)
BUN SERPL-MCNC: 11.3 MG/DL (ref 8–23)
CALCIUM SERPL-MCNC: 7.9 MG/DL (ref 8.8–10.2)
CHLORIDE SERPL-SCNC: 101 MMOL/L (ref 98–107)
CREAT SERPL-MCNC: 0.5 MG/DL (ref 0.51–0.95)
DEPRECATED HCO3 PLAS-SCNC: 25 MMOL/L (ref 22–29)
EGFRCR SERPLBLD CKD-EPI 2021: >90 ML/MIN/1.73M2
ERYTHROCYTE [DISTWIDTH] IN BLOOD BY AUTOMATED COUNT: 15 % (ref 10–15)
GLUCOSE SERPL-MCNC: 96 MG/DL (ref 70–99)
HCT VFR BLD AUTO: 24.7 % (ref 35–47)
HGB BLD-MCNC: 8.1 G/DL (ref 11.7–15.7)
MAGNESIUM SERPL-MCNC: 1.9 MG/DL (ref 1.7–2.3)
MCH RBC QN AUTO: 31.2 PG (ref 26.5–33)
MCHC RBC AUTO-ENTMCNC: 32.8 G/DL (ref 31.5–36.5)
MCV RBC AUTO: 95 FL (ref 78–100)
PLATELET # BLD AUTO: 170 10E3/UL (ref 150–450)
POTASSIUM SERPL-SCNC: 3.9 MMOL/L (ref 3.4–5.3)
POTASSIUM SERPL-SCNC: 3.9 MMOL/L (ref 3.4–5.3)
RBC # BLD AUTO: 2.6 10E6/UL (ref 3.8–5.2)
SODIUM SERPL-SCNC: 134 MMOL/L (ref 135–145)
WBC # BLD AUTO: 6.6 10E3/UL (ref 4–11)

## 2023-12-30 PROCEDURE — 97530 THERAPEUTIC ACTIVITIES: CPT | Mod: GP

## 2023-12-30 PROCEDURE — 250N000013 HC RX MED GY IP 250 OP 250 PS 637: Performed by: INTERNAL MEDICINE

## 2023-12-30 PROCEDURE — 36415 COLL VENOUS BLD VENIPUNCTURE: CPT | Performed by: INTERNAL MEDICINE

## 2023-12-30 PROCEDURE — 97165 OT EVAL LOW COMPLEX 30 MIN: CPT | Mod: GO

## 2023-12-30 PROCEDURE — 250N000013 HC RX MED GY IP 250 OP 250 PS 637: Performed by: STUDENT IN AN ORGANIZED HEALTH CARE EDUCATION/TRAINING PROGRAM

## 2023-12-30 PROCEDURE — 83735 ASSAY OF MAGNESIUM: CPT | Performed by: INTERNAL MEDICINE

## 2023-12-30 PROCEDURE — 97530 THERAPEUTIC ACTIVITIES: CPT | Mod: GO

## 2023-12-30 PROCEDURE — 99232 SBSQ HOSP IP/OBS MODERATE 35: CPT | Performed by: STUDENT IN AN ORGANIZED HEALTH CARE EDUCATION/TRAINING PROGRAM

## 2023-12-30 PROCEDURE — 120N000001 HC R&B MED SURG/OB

## 2023-12-30 PROCEDURE — 82374 ASSAY BLOOD CARBON DIOXIDE: CPT | Performed by: INTERNAL MEDICINE

## 2023-12-30 PROCEDURE — 85027 COMPLETE CBC AUTOMATED: CPT | Performed by: INTERNAL MEDICINE

## 2023-12-30 RX ORDER — NICOTINE POLACRILEX 4 MG
15-30 LOZENGE BUCCAL
Status: DISCONTINUED | OUTPATIENT
Start: 2023-12-30 | End: 2024-01-03 | Stop reason: HOSPADM

## 2023-12-30 RX ORDER — DEXTROSE MONOHYDRATE 25 G/50ML
25-50 INJECTION, SOLUTION INTRAVENOUS
Status: DISCONTINUED | OUTPATIENT
Start: 2023-12-30 | End: 2024-01-03 | Stop reason: HOSPADM

## 2023-12-30 RX ADMIN — SENNOSIDES AND DOCUSATE SODIUM 1 TABLET: 8.6; 5 TABLET ORAL at 20:37

## 2023-12-30 RX ADMIN — SENNOSIDES AND DOCUSATE SODIUM 1 TABLET: 8.6; 5 TABLET ORAL at 09:12

## 2023-12-30 RX ADMIN — APIXABAN 2.5 MG: 2.5 TABLET, FILM COATED ORAL at 20:37

## 2023-12-30 RX ADMIN — ACETAMINOPHEN 975 MG: 325 TABLET, FILM COATED ORAL at 23:39

## 2023-12-30 RX ADMIN — ACETAMINOPHEN 975 MG: 325 TABLET, FILM COATED ORAL at 09:11

## 2023-12-30 RX ADMIN — ROSUVASTATIN CALCIUM 20 MG: 20 TABLET, FILM COATED ORAL at 20:37

## 2023-12-30 RX ADMIN — POLYETHYLENE GLYCOL 3350 17 G: 17 POWDER, FOR SOLUTION ORAL at 09:11

## 2023-12-30 RX ADMIN — LISINOPRIL 2.5 MG: 2.5 TABLET ORAL at 09:12

## 2023-12-30 RX ADMIN — ACETAMINOPHEN 975 MG: 325 TABLET, FILM COATED ORAL at 16:29

## 2023-12-30 RX ADMIN — ATENOLOL 100 MG: 50 TABLET ORAL at 20:37

## 2023-12-30 RX ADMIN — APIXABAN 2.5 MG: 2.5 TABLET, FILM COATED ORAL at 09:12

## 2023-12-30 ASSESSMENT — ACTIVITIES OF DAILY LIVING (ADL)
ADLS_ACUITY_SCORE: 25
ADLS_ACUITY_SCORE: 25
DEPENDENT_IADLS:: INDEPENDENT
ADLS_ACUITY_SCORE: 25
PREVIOUS_RESPONSIBILITIES: MEAL PREP;HOUSEKEEPING;LAUNDRY;SHOPPING;MEDICATION MANAGEMENT;DRIVING;FINANCES
ADLS_ACUITY_SCORE: 25
ADLS_ACUITY_SCORE: 30
ADLS_ACUITY_SCORE: 25
ADLS_ACUITY_SCORE: 25
ADLS_ACUITY_SCORE: 29
ADLS_ACUITY_SCORE: 25
ADLS_ACUITY_SCORE: 30

## 2023-12-30 NOTE — PROGRESS NOTES
[facility]    Medicine Progress Note - Hospitalist Service    Date of Admission:  12/27/2023    Assessment & Plan   Mercedes Nelson is a 78 year old female admitted on 12/27/2023. She has a past medical history significant for hypertension, hyperlipidemia, osteoarthritis, and recent total knee replacement.  She presented to the emergency room today with right knee pain after fall.  Found to have distal right femur fracture.     Distal right femur fractures/p ORIF with retrograde nail  Mechanical fall.  -Orthopedic surgery consult appreciated.  -Underwent repair in operating room with orthopedic surgery on 12/28.  -Pain medications as needed.  -Use incentive spirometry.  -Work with therapy.    Acute hypoxic  respiratory failure likely 2/2 atelectasis  Requiring 1L NC. Denied difficulty breathing. Does not appear volume overload. CXR without acute pathology. No leukocytosis. No cough. Anemia may be contributing. S/p 1 U PRBC. Likely atelectasis.     -incentive spirometry q2h  -wean off to RA as able.      Hypertension.  -Continue atenolol.  -Restart lisinopril at 2.5 mg a day.  -IV hydralazine if needed if SBP>180     Hyperlipidemia.  -Continue rosuvastatin.     Hypokalemia.  -Potassium replacement protocol.      Hyponatremia.  -Mild.  Sodium 131.  -Stop IV fluids.  -Recheck metabolic panel tomorrow.     Acute blood loss anemia.  -Postoperative.  -s/p 1 U PRBC. Hb now stable. No overt bleeding. Hb at 8.1 post transfusion.   -s/p Iron sucrose 300 mg IV once 12/29.  -monitor Hb      Drug-induced platelet defect.  Drug-induced coagulation defect.  -On aspirin prior to admission due to previous surgery.  -Orthopedic surgery now starting apixaban 2.5 mg twice a day for DVT prophylaxis on 12/29.        Daughter and son updated at bedside on 12/29.        Diet: Advance Diet as Tolerated: Regular Diet Adult    DVT Prophylaxis: DOAC  Johnson Catheter: Not present  Lines: None     Cardiac Monitoring:  "None  Code Status: Full Code      Clinically Significant Risk Factors                         # Overweight: Estimated body mass index is 26.61 kg/m  as calculated from the following:    Height as of this encounter: 1.727 m (5' 8\").    Weight as of this encounter: 79.4 kg (175 lb)., PRESENT ON ADMISSION            Disposition Plan   Pending weaning off of oxygen and TCU placement.          Judith Mcpherson MD  Hospitalist Service  [facility]  Securely message with [employer] (more info)  Text page via [employer] Paging/Directory   ______________________________________________________________________    Interval History   Does not feel short of breath.  No cough.  Denies chest pain, fevers, chills, nausea, vomiting. Pain is well controlled.     4 point ROS is otherwise negative.     Physical Exam   Vital Signs: Temp: 97.5  F (36.4  C) Temp src: Tympanic BP: (!) 187/83 Pulse: 67   Resp: 16 SpO2: 95 % O2 Device: Nasal cannula Oxygen Delivery: 1 LPM  Weight: 175 lbs 0 oz    Gen:  NAD, A&Ox3.  Eyes:  PERRL, sclera anicteric.  OP:  MMM, no lesions.  Neck:  Supple.  CV:  Regular, no murmurs.  Lung:  CTA b/l, normal effort.  Ab:  +BS, soft.  Skin:  Warm, dry to touch.  No rash.  Ext:  No pitting edema LE b/l.          44 MINUTES SPENT BY ME on the date of service doing chart review, history, exam, documentation & further activities per the note.      Data     I have personally reviewed the following data over the past 24 hrs:    6.6  \   8.1 (L)   / 170     134 (L) 101 11.3 /  96   3.9; 3.9 25 0.50 (L) \       Imaging results reviewed over the past 24 hrs:   No results found for this or any previous visit (from the past 24 hour(s)).    "

## 2023-12-30 NOTE — CONSULTS
Care Management Initial Consult    General Information  Assessment completed with: Patient, Spouse or significant other,    Type of CM/SW Visit: Initial Assessment    Primary Care Provider verified and updated as needed:     Readmission within the last 30 days:        Reason for Consult: discharge planning  Advance Care Planning:            Communication Assessment  Patient's communication style: spoken language (English or Bilingual)    Hearing Difficulty or Deaf: no   Wear Glasses or Blind: no    Cognitive  Cognitive/Neuro/Behavioral: WDL                      Living Environment:   People in home: spouse     Current living Arrangements: house      Able to return to prior arrangements:         Family/Social Support:  Care provided by: self  Provides care for:    Marital Status:             Description of Support System: Supportive, Involved         Current Resources:   Patient receiving home care services: No     Community Resources: None  Equipment currently used at home: walker, rolling  Supplies currently used at home:      Employment/Financial:  Employment Status:          Financial Concerns:             Does the patient's insurance plan have a 3 day qualifying hospital stay waiver?  Yes     Which insurance plan 3 day waiver is available? [employer]    Will the waiver be used for post-acute placement? No    Lifestyle & Psychosocial Needs:  Social Determinants of Health     Food Insecurity: Low Risk  (12/1/2023)    Food Insecurity     Within the past 12 months, did you worry that your food would run out before you got money to buy more?: No     Within the past 12 months, did the food you bought just not last and you didn t have money to get more?: No   Depression: Not at risk (7/13/2023)    PHQ-2     PHQ-2 Score: 0   Housing Stability: Low Risk  (12/1/2023)    Housing Stability     Do you have housing? : Yes     Are you worried about losing your housing?: No   Tobacco Use: Low Risk  (12/28/2023)     Patient History     Smoking Tobacco Use: Never     Smokeless Tobacco Use: Never     Passive Exposure: Not on file   Financial Resource Strain: Low Risk  (12/1/2023)    Financial Resource Strain     Within the past 12 months, have you or your family members you live with been unable to get utilities (heat, electricity) when it was really needed?: No   Alcohol Use: Not on file   Transportation Needs: Low Risk  (12/1/2023)    Transportation Needs     Within the past 12 months, has lack of transportation kept you from medical appointments, getting your medicines, non-medical meetings or appointments, work, or from getting things that you need?: No   Physical Activity: Not on file   Interpersonal Safety: Low Risk  (12/1/2023)    Interpersonal Safety     Do you feel physically and emotionally safe where you currently live?: Yes     Within the past 12 months, have you been hit, slapped, kicked or otherwise physically hurt by someone?: No     Within the past 12 months, have you been humiliated or emotionally abused in other ways by your partner or ex-partner?: No   Stress: Not on file   Social Connections: Not on file       Functional Status:  Prior to admission patient needed assistance:   Dependent ADLs:: Ambulation-walker, Ambulation-cane  Dependent IADLs:: Independent       Mental Health Status:  Mental Health Status: No Current Concerns       Chemical Dependency Status:  Chemical Dependency Status: No Current Concerns             Values/Beliefs:  Spiritual, Cultural Beliefs, Mosque Practices, Values that affect care:                 Additional Information:  SW met with pt and spouse.  They live in a house and pt had been independent with all ADL's and IADL's prior to this stay.  Pt was using a walker and cane after her knee placement, however, reports she was weaning herself off of that. She reports one fall in the last six months.  She has never been to TCU in the past.  She is requesting TCU referrals to [facility] and [facility].  Shared room. She is also requesting HE wc at discharge. She is aware of the additional cost.     Plan:  TCU referrals sent, will need PAS.      Sarah WALSH, [facility]  Inpatient Care Coordination   [facility]   [phone]

## 2023-12-30 NOTE — PLAN OF CARE
Blood completed last evening. Recheck HgB this morning. Oxycodone x1, otherwise scheduled tylenol for pain. Johnson out yesterday and patient has been able to void. Purewick overnight. Dressing CDI- immobilizer on OOB. Saline locked. Slept well. Likely to TCU if patient agrees.

## 2023-12-30 NOTE — PROGRESS NOTES
Orthopedic Surgery  Mercedes Nelson  12/30/2023     Admit Date:  12/27/2023    POD: 2 Days Post-Op   Procedure(s):  Open reduction internal fixation, right distal femur fracture with retrograde nail     Patient resting comfortably in chair. Reports minimal pain of the right leg today.   Pain controlled.  Tolerating oral intake.    Denies nausea or vomiting  Denies chest pain or shortness of breath    Temp:  [97.5  F (36.4  C)-100.3  F (37.9  C)] 97.5  F (36.4  C)  Pulse:  [67-80] 67  Resp:  [14-18] 16  BP: (110-187)/(43-83) 187/83  SpO2:  [92 %-95 %] 95 %    Alert and oriented  Knee immobilizer in place.   Dressing is clean, dry, and intact.   Minimal erythema of the surrounding skin.   Bilateral calves are soft, non-tender.  Right lower extremity is NVI.  Sensation intact bilateral lower extremities  Patient able to resist dorsi and plantar flexion bilaterally  +Dp pulse    Labs:  Recent Labs   Lab Test 12/30/23  0715 12/29/23  1525 12/29/23  0836 12/28/23  1204 12/28/23  0600 12/27/23  1427   WBC 6.6  --   --   --  6.9 10.6   HGB 8.1* 6.8* 7.1*   < > 9.6* 10.6*     --   --   --  218 256    < > = values in this interval not displayed.     Recent Labs   Lab Test 12/27/23  1427   INR 1.09     No lab results found.      1. PLAN:   Continue Eliquis 2.5 mg BID for DVT prophylaxis.     Mobilize with PT/OT    NWB RLE.    Continue current pain regiment.   Dressings: Keep intact.  Change if >60% saturated or peeling off.    Follow-up: 2 weeks post-op with Dr. Kimball's team    2. Disposition   Anticipate d/c to TCU when medically cleared and progressing in PT.  At two weeks, the staples can be removed. If all is going well, first followup could be 6 weeks with Dr. Kimball [facility], with 2 views of the right femur, sooner if there is any difficulty.      Becky Cazares PA-C

## 2023-12-30 NOTE — PROGRESS NOTES
"   12/30/23 0800   Appointment Info   Signing Clinician's Name / Credentials (OT) Tyra Ball, OTR/L   Living Environment   People in Home spouse   Current Living Arrangements house   Living Environment Comments 2 story home with bed and bathroom upstairs. Pt has a tub/shower combo with no chair. No needs in basement   Self-Care   Usual Activity Tolerance good   Current Activity Tolerance poor   Equipment Currently Used at Home walker, rolling   Fall history within last six months yes   Number of times patient has fallen within last six months 1   Activity/Exercise/Self-Care Comment patient was ind with all ADLs prior to TKA   Instrumental Activities of Daily Living (IADL)   Previous Responsibilities meal prep;housekeeping;laundry;shopping;medication management;driving;finances   IADL Comments ind with all stuff prior to TKA   General Information   Onset of Illness/Injury or Date of Surgery 12/27/23   Referring Physician Hal Kimball MD   Patient/Family Therapy Goal Statement (OT) \"get home\"   Additional Occupational Profile Info/Pertinent History of Current Problem Per medical chart: Mercedes Nelson is a 78 year old female admitted on 12/27/2023. She has a past medical history significant for hypertension, hyperlipidemia, osteoarthritis, and recent total knee replacement. She presented to the emergency room today with right knee pain after fall. Found to have distal right femur fracture. POD#2 s/p Right ORIF, right distal femur fracture with retrograde nail.   Existing Precautions/Restrictions fall   Right Lower Extremity (Weight-bearing Status) non weight-bearing (NWB)   Cognitive Status Examination   Orientation Status orientation to person, place and time   Cognitive Status Comments [place] impacting understanding of questions   Visual Perception   Visual Impairment/Limitations WNL   Pain Assessment   Patient Currently in Pain No  (when not moving)   Range of Motion Comprehensive   General Range of Motion no " range of motion deficits identified   Strength Comprehensive (MMT)   General Manual Muscle Testing (MMT) Assessment no strength deficits identified   Coordination   Upper Extremity Coordination No deficits were identified   Bed Mobility   Bed Mobility supine-sit   Supine-Sit [place] (Bed Mobility) moderate assist (50% patient effort)   Comment (Bed Mobility) A with right leg   Transfers   Transfers bed-chair transfer   Transfer Skill: Bed to Chair/Chair to Bed   Bed-Chair [place] (Transfers) moderate assist (50% patient effort);1 person to manage equipment   Activities of Daily Living   BADL Assessment/Intervention lower body dressing;toileting   Lower Body Dressing Assessment/Training   [place] Level (Lower Body Dressing) maximum assist (25% patient effort)   Toileting   [place] Level (Toileting) maximum assist (25% patient effort)   Clinical Impression   Criteria for Skilled Therapeutic Interventions Met (OT) Yes, treatment indicated   OT Diagnosis decreased ADLS   OT Problem List-Impairments impacting ADL problems related to;activity tolerance impaired;balance;range of motion (ROM);strength;post-surgical precautions   ADL comments/analysis LB dressing, UB dressing, showering, standing ADLs, functional transfers   Assessment of Occupational Performance 3-5 Performance Deficits   Planned Therapy Interventions (OT) ADL retraining;IADL retraining;transfer training;progressive activity/exercise   Clinical Decision Making Complexity (OT) problem focused assessment/low complexity   Risk & Benefits of therapy have been explained evaluation/treatment results reviewed;care plan/treatment goals reviewed;current/potential barriers reviewed;risks/benefits reviewed;participants included;patient;daughter   OT Total Evaluation Time   OT Eval, Low Complexity Minutes (84735) 10   OT Goals   Therapy Frequency (OT) 3 times/week   OT Predicted Duration/Target Date for Goal Attainment 01/03/24   OT Goals Upper Body  Dressing;Lower Body Dressing;Toilet Transfer/Toileting;Hygiene/Grooming   OT: Hygiene/Grooming minimal assist;while standing;within precautions   OT: Upper Body Dressing Minimal assist;within precautions   OT: Lower Body Dressing Maximum assist;within precautions   OT: Toilet Transfer/Toileting Minimal assist;within precautions;cleaning and garment management;toilet transfer   Interventions   Interventions Quick Adds Therapeutic Activity   OT Discharge Planning   OT Plan commode transfer, LB dressing (supine vs EOB with DME)   OT Discharge Recommendation (DC Rec) Transitional Care Facility   OT Rationale for DC Rec Pt is performing below baseline with deficits in ROM, weight bearing restrictions, pain, activity tolerance and balance limiting safety and (I) with ADLs. Would benefit from TCU to optimize ind with self care prior to discharge to least restrictive environment   OT Brief overview of current status bed mobility mod A, SPT min A, ongoing VC for WB restrictions   OT Equipment Needed at Discharge   (defer to TCU)   Total Session Time   Total Session Time (sum of timed and untimed services) 10

## 2023-12-31 ENCOUNTER — APPOINTMENT (OUTPATIENT)
Dept: PHYSICAL THERAPY | Facility: CLINIC | Age: 78
DRG: 480 | End: 2023-12-31
Payer: COMMERCIAL

## 2023-12-31 LAB
MAGNESIUM SERPL-MCNC: 1.9 MG/DL (ref 1.7–2.3)
POTASSIUM SERPL-SCNC: 3.7 MMOL/L (ref 3.4–5.3)

## 2023-12-31 PROCEDURE — 250N000013 HC RX MED GY IP 250 OP 250 PS 637: Performed by: INTERNAL MEDICINE

## 2023-12-31 PROCEDURE — 84132 ASSAY OF SERUM POTASSIUM: CPT | Performed by: STUDENT IN AN ORGANIZED HEALTH CARE EDUCATION/TRAINING PROGRAM

## 2023-12-31 PROCEDURE — 36415 COLL VENOUS BLD VENIPUNCTURE: CPT | Performed by: STUDENT IN AN ORGANIZED HEALTH CARE EDUCATION/TRAINING PROGRAM

## 2023-12-31 PROCEDURE — 97530 THERAPEUTIC ACTIVITIES: CPT | Mod: GP

## 2023-12-31 PROCEDURE — 120N000001 HC R&B MED SURG/OB

## 2023-12-31 PROCEDURE — 83735 ASSAY OF MAGNESIUM: CPT | Performed by: STUDENT IN AN ORGANIZED HEALTH CARE EDUCATION/TRAINING PROGRAM

## 2023-12-31 PROCEDURE — 99232 SBSQ HOSP IP/OBS MODERATE 35: CPT | Performed by: INTERNAL MEDICINE

## 2023-12-31 PROCEDURE — 250N000013 HC RX MED GY IP 250 OP 250 PS 637: Performed by: STUDENT IN AN ORGANIZED HEALTH CARE EDUCATION/TRAINING PROGRAM

## 2023-12-31 RX ORDER — LISINOPRIL 5 MG/1
5 TABLET ORAL DAILY
Status: COMPLETED | OUTPATIENT
Start: 2023-12-31 | End: 2023-12-31

## 2023-12-31 RX ORDER — LISINOPRIL 10 MG/1
10 TABLET ORAL DAILY
Status: DISCONTINUED | OUTPATIENT
Start: 2024-01-01 | End: 2024-01-03 | Stop reason: HOSPADM

## 2023-12-31 RX ADMIN — ROSUVASTATIN CALCIUM 20 MG: 20 TABLET, FILM COATED ORAL at 19:50

## 2023-12-31 RX ADMIN — SENNOSIDES AND DOCUSATE SODIUM 1 TABLET: 8.6; 5 TABLET ORAL at 08:18

## 2023-12-31 RX ADMIN — ATENOLOL 100 MG: 50 TABLET ORAL at 19:49

## 2023-12-31 RX ADMIN — APIXABAN 2.5 MG: 2.5 TABLET, FILM COATED ORAL at 19:50

## 2023-12-31 RX ADMIN — SENNOSIDES AND DOCUSATE SODIUM 1 TABLET: 8.6; 5 TABLET ORAL at 19:49

## 2023-12-31 RX ADMIN — POLYETHYLENE GLYCOL 3350 17 G: 17 POWDER, FOR SOLUTION ORAL at 08:18

## 2023-12-31 RX ADMIN — APIXABAN 2.5 MG: 2.5 TABLET, FILM COATED ORAL at 08:18

## 2023-12-31 RX ADMIN — LISINOPRIL 2.5 MG: 2.5 TABLET ORAL at 08:18

## 2023-12-31 RX ADMIN — LISINOPRIL 5 MG: 5 TABLET ORAL at 13:23

## 2023-12-31 RX ADMIN — ACETAMINOPHEN 975 MG: 325 TABLET, FILM COATED ORAL at 16:00

## 2023-12-31 RX ADMIN — ACETAMINOPHEN 975 MG: 325 TABLET, FILM COATED ORAL at 08:18

## 2023-12-31 ASSESSMENT — ACTIVITIES OF DAILY LIVING (ADL)
ADLS_ACUITY_SCORE: 30
ADLS_ACUITY_SCORE: 30
ADLS_ACUITY_SCORE: 29
ADLS_ACUITY_SCORE: 29
ADLS_ACUITY_SCORE: 30
ADLS_ACUITY_SCORE: 29
ADLS_ACUITY_SCORE: 29
ADLS_ACUITY_SCORE: 30

## 2023-12-31 NOTE — PROGRESS NOTES
Care Management Follow Up    Length of Stay (days): 4    Expected Discharge Date: 12/29/2023     Concerns to be Addressed: discharge planning       Patient plan of care discussed at interdisciplinary rounds: Yes    Anticipated Discharge Disposition: Skilled Nursing Facility     Additional Information:  Chart reviewed.  [facility] declined.  [facility] and [facility] are still pending.     Plan:  Anticipate not hearing back from TCU until Tuesday d/t the holiday.     Sarah WALSH, [facility]  Inpatient Care Coordination   [facility]   [phone]

## 2023-12-31 NOTE — PROGRESS NOTES
[facility]  Hospitalist Progress Note  Dea Mcneal MD 12/31/2023    Reason for Stay (Diagnosis):          Assessment and Plan:      Summary of Stay: Mercedes Nelson is a 78 year old female with a history of  htn/hlp, OA with prior R TKA  admitted on 12/27/2023 with right knee pain after a fall and found to have a distal right femur fx.     She underwent ORIF on 12/28/2021    She is doing well but will need TCU at discharge       Problem List:   Mechanical fall complicated by distal right femur fracture  Appreciate Ortho's help, s/p ORIF 12/28  PT/OT and TCU placement   Follow-up with ortho in 6 weeks with Dr Kimball  Can remove kg in 2 weeks      Acute anemia hgb typically in the 13-14 range and was so earlier this month  Iron/IBC/iron sat all low consistent with ACD but I still wonder a bit about a component of JOHN given rapid onset.  I wonder if the large effusion seen on admission film hemarthrosis and that's the reason for her initial drop   And ABL anemia   Hgb 10.6-->8.4.    No ongoing bleeding.   ml  She typically has normal hgb levels.  Admit hgb is anemic and this would be a new anemia. This has been confounded by ABL anemia via surgery  She was transfused a single unit   -follow-up with PCP       Hyponatremia mild and improving.  Not typically hyponatremic.  ? Related to pain from her fall and femur fx     Hypokalemia  Persistent - no culprit medications   Replace and recheck     Htn/hlp  Pta regimen atenolol 100 mg every day, lisinopril 10 mg every day, rosuvastatin 20 mg every day   -resumed atenolol 100 mg every day, lisinopril at 2.5 mg every day, and rosuvastatin  -BPs are high, will increase lisinopril back to baseline which should also help with her hypokalemia         DVT Prophylaxis: DOAC  Code Status: Full Code  Functional Status: neglected to ask   Diet: reg texture  Johnson:not in place  Access PIV    Disposition Plan   Expected discharge in 2 days to transitional care unit  "once bed found .     Entered: Dea Mcneal MD 12/31/2023, 11:56 AM       Securely message with [employer] (more info)  Text page via [employer] Paging/Directory       I spent 35 minutes reviewing epic including prior labs/imaging/medical history and notes related to this encounter  In addition time was spent in interveiwing the patient, communicating with contacts, and medical decision making      Interval History (Subjective):      Feels good, pain is controlled, pooping about every other day and no sense of constipation.  No cp or sob.                   Physical Exam:      Last Vital Signs:  BP (!) 143/84 (BP Location: Right arm, Patient Position: Semi-Whitten's, Cuff Size: Adult Regular)   Pulse 65   Temp 98.9  F (37.2  C) (Temporal)   Resp 16   Ht 1.727 m (5' 8\")   Wt 79.4 kg (175 lb)   SpO2 92%   BMI 26.61 kg/m      I/O:  Pleasant nad looks < stated age head nc/at sclera clear lungs ctab nl effort rrr no mrg no le edema skin warm and dry nocc alert and oriented affect appropriate sevilla belly s/nt/nd           Medications:      All current medications were reviewed with changes reflected in problem list.         Data:      All new lab and imaging data was reviewed.   Labs:  Recent Labs   Lab 12/31/23  0721 12/30/23  0715   NA  --  134*   POTASSIUM 3.7 3.9  3.9   CHLORIDE  --  101   CO2  --  25   ANIONGAP  --  8   GLC  --  96   BUN  --  11.3   CR  --  0.50*   GFRESTIMATED  --  >90   SARAH  --  7.9*     Recent Labs   Lab 12/30/23  0715   WBC 6.6   HGB 8.1*   HCT 24.7*   MCV 95        Recent Labs   Lab 12/30/23  0715 12/29/23  0836 12/28/23  0600 12/27/23  1427   GLC 96 112* 114* 108*     No results for input(s): \"AST\", \"ALT\", \"GGT\", \"ALKPHOS\", \"BILITOTAL\", \"BILICONJ\", \"BILIDIRECT\", \"WILTON\" in the last 168 hours.    Invalid input(s): \"BILIRUBININDIRECT\"   Imaging:   Results for orders placed or performed during the hospital encounter of 12/27/23   XR Femur Right 2 Views    Narrative    Examination:  XR KNEE " RIGHT 1/2 VIEWS, XR FEMUR RIGHT 2 VIEWS    Date:  12/27/2023 1:53 PM     Clinical Information: Right knee pain.    Comparison: none.      Impression    Impression:    1.  Acute oblique fracture of the distal femoral metadiaphysis,  moderately displaced, with minimal comminution. The cemented femoral  components are well seated without evidence of loosening. Joint  alignment remains normal. Large knee joint effusion.    2.  No additional femoral fracture. Normal right hip joint alignment  with maintained joint spacing.    BRANDON HOYT MD         SYSTEM ID:  [id number]   XR Knee Right 1/2 Views    Narrative    Examination:  XR KNEE RIGHT 1/2 VIEWS, XR FEMUR RIGHT 2 VIEWS    Date:  12/27/2023 1:53 PM     Clinical Information: Right knee pain.    Comparison: none.      Impression    Impression:    1.  Acute oblique fracture of the distal femoral metadiaphysis,  moderately displaced, with minimal comminution. The cemented femoral  components are well seated without evidence of loosening. Joint  alignment remains normal. Large knee joint effusion.    2.  No additional femoral fracture. Normal right hip joint alignment  with maintained joint spacing.    BRANDON HOYT MD         SYSTEM ID:  [id number]   CT Femur Right w/o Contrast    Narrative    CT RIGHT FEMUR WITHOUT CONTRAST 12/27/2023 2:47 PM    CLINICAL HISTORY: Right thigh pain, known distal femoral periprostatic  fracture. Treatment planning exam.    TECHNIQUE: Multiple contiguous axial CT images were obtained of the  right femur without intravenous contrast. Data was reformatted into  soft tissue and bone algorithms, in coronal and sagittal planes. Dose  reduction techniques were used.    COMPARISON: Femur radiographs 12/27/2023.    FINDINGS:    Distal femoral fractures. There is a primary obliquely oriented  fracture of the distal femoral metadiaphysis. The distal fracture  component is displaced posterior and proximally relative to the  proximal  component, by about 4 cm. There is mild fracture comminution,  with a few cortical fragments present medially and anteriorly.    There is a cemented right total knee arthroplasty. Fracture lines come  to the anterior margin of the cemented femoral component, but there is  no evidence that the component is lucent/ from the femoral  epiphysis.    The right knee joint remains normally aligned. The tibial component  and the patellar resurfacing component remain well seated without  complication.    There is a large knee joint effusion, and moderate soft tissue  swelling around the fracture. There is no drainable hematoma.    Bones are demineralized. No evidence of a discrete lytic or sclerotic  bone lesion.    Moderate degenerative arthritic joint space narrowing and spurring in  the right hip.    Mild generalized atrophy of the right thigh musculature.    Mild femoral artery atherosclerotic calcification.      Impression    IMPRESSION:    1.  Acute distal femoral fracture, a mildly comminuted fracture with  primary oblique orientation, involving the distal femoral  metadiaphysis. The fracture is displaced approximately 4 cm.    2.  Fracture lines come to the anterior margin of the cemented femoral  component for a right total knee arthroplasty. However, components  appear well-seated without evidence of loosening or other  complication.    3.  Moderate soft tissue edema/swelling around the fracture. Large  knee joint effusion.    4.  Right total knee arthroplasty hardware is in place. As noted  above, components are well seated without loosening or other  complication. Right knee joint alignment remains normal.    5.  Bones are demineralized. No concerning bone lesion or evidence of  pathologic fracture.    6.  Moderate degenerative arthritic changes in the right hip. The  right hip joint remains normally aligned.    BRANDON HOYT MD         SYSTEM ID:  [id number]   XR Surgery AVTAR L/T 5 Min Fluoro w  Stills    Narrative    This exam was marked as non-reportable because it will not be read by a   radiologist or a [place] non-radiologist provider.         XR Femur Port Right 2 Views    Narrative    XR FEMUR PORT RIGHT 2 VIEWS 12/28/2023 11:52 AM     HISTORY: retrograde femur    COMPARISON: 12/27/2023      Impression    IMPRESSION: Intramedullary nail, screw and cerclage wire fixation  across the comminuted distal femoral fracture have been placed.  Postoperative air is present. Skin staples are in place. The hardware  is intact. TKA with patellar resurfacing.    NEYDA ANTHONY MD         SYSTEM ID:  [id number]   XR Chest Port 1 View    Narrative    CHEST PORTABLE ONE VIEW  December 29, 2023 9:44 AM     HISTORY: Hypoxia.    COMPARISON: None.      Impression    IMPRESSION: Cardiomegaly. No focal airspace disease. No pulmonary  edema pattern identified at this time.    JAYLAN ARCE MD         SYSTEM ID:  [id number]

## 2023-12-31 NOTE — PLAN OF CARE
Goal Outcome Evaluation:      Plan of Care Reviewed With: patient      Pt A&O x 4. VSS. Assist x 2 with walker and gait belt. Was on 1 L of oxygen. Now on room air. O2 Sat 93 %. Hemoglobin was 6.8 on 12/29/23. 1 Units of blood was transfused. Today hemoglobin 8.1. CMS intact.  Taking scheduled tylenol for pain. Tolerating regular diet.

## 2023-12-31 NOTE — PROGRESS NOTES
Orthopedic Surgery  Mercedes Nelson  12/31/2023     Admit Date:  12/27/2023    POD: 3 Days Post-Op   Procedure(s):  Open reduction internal fixation, right distal femur fracture with retrograde nail     Patient resting comfortably in chair. Reports minimal pain of the right leg today.   Pain controlled.  Tolerating oral intake.    Denies nausea or vomiting  Denies chest pain or shortness of breath    Temp:  [97.2  F (36.2  C)-100  F (37.8  C)] 100  F (37.8  C)  Pulse:  [64-72] 68  Resp:  [14-16] 16  BP: (134-143)/(60-84) 137/68  SpO2:  [91 %-94 %] 91 %    Alert and oriented  Knee immobilizer in place.   Dressing is clean, dry, and intact.   Minimal erythema of the surrounding skin.   Bilateral calves are soft, non-tender.  Right lower extremity is NVI.  Sensation intact bilateral lower extremities  Patient able to resist dorsi and plantar flexion bilaterally  +Dp pulse    Labs:  Recent Labs   Lab Test 12/30/23  0715 12/29/23  1525 12/29/23  0836 12/28/23  1204 12/28/23  0600 12/27/23  1427   WBC 6.6  --   --   --  6.9 10.6   HGB 8.1* 6.8* 7.1*   < > 9.6* 10.6*     --   --   --  218 256    < > = values in this interval not displayed.     Recent Labs   Lab Test 12/27/23  1427   INR 1.09     No lab results found.      1. PLAN:   Continue Eliquis 2.5 mg BID for DVT prophylaxis.     Mobilize with PT/OT    NWB RLE.    Continue current pain regimen   Dressings: Keep intact.  Change if >60% saturated or peeling off.    Follow-up: 2 weeks post-op with Dr. Kimball's team    2. Disposition   Anticipate d/c to TCU when medically cleared and progressing in PT.  At two weeks, the staples can be removed. If all is going well, first followup could be 6 weeks with Dr. Kimball [facility], with 2 views of the right femur, sooner if there is any difficulty.      Nicolasa Pete PA-C

## 2023-12-31 NOTE — PROGRESS NOTES
"Cared for pt [id number]    Pt A & O x 4, /72   Pulse 72   Temp 100  F (37.8  C) (Temporal)   Resp 16   Ht 1.727 m (5' 8\")   Wt 79.4 kg (175 lb)   SpO2 93%   BMI 26.61 kg/m  . CMS intact, dressing to RLE C/D/I. Pt is is NWB to RLE. Pain has been manage with scheduled tylenol. Pure wick in place during the night. Discharge when medically cleared to possibly TCU.  "

## 2023-12-31 NOTE — PLAN OF CARE
Goal Outcome Evaluation:  Vital signs stable, encouraged inspirometer use.  CMS intact, dressing dry to left hip and leg, knee immobilizer on. Ice pack applied. NWB to RLE.  Pain controlled with tylenol, pt refuses to take any narcotic for pain control as well as prednisone eye drops.  Voiding.Hgb 8.1.      Plan of Care Reviewed With: patient, spouse

## 2023-12-31 NOTE — PLAN OF CARE
Goal Outcome Evaluation:       A&Ox4, VSS on RA. Not complaining of any pain during the shift. Pivoting to the commode and chair. Tolerating diet. SUSAN Westbrook'caryl LBM 12/31. Plan for TCU placement for rehab.

## 2024-01-01 LAB
ALBUMIN UR-MCNC: NEGATIVE MG/DL
ANION GAP SERPL CALCULATED.3IONS-SCNC: 8 MMOL/L (ref 7–15)
APPEARANCE UR: ABNORMAL
BILIRUB UR QL STRIP: NEGATIVE
BUN SERPL-MCNC: 9 MG/DL (ref 8–23)
CALCIUM SERPL-MCNC: 8.3 MG/DL (ref 8.8–10.2)
CHLORIDE SERPL-SCNC: 98 MMOL/L (ref 98–107)
COLOR UR AUTO: YELLOW
CREAT SERPL-MCNC: 0.5 MG/DL (ref 0.51–0.95)
DEPRECATED HCO3 PLAS-SCNC: 26 MMOL/L (ref 22–29)
EGFRCR SERPLBLD CKD-EPI 2021: >90 ML/MIN/1.73M2
ERYTHROCYTE [DISTWIDTH] IN BLOOD BY AUTOMATED COUNT: 15 % (ref 10–15)
GLUCOSE SERPL-MCNC: 112 MG/DL (ref 70–99)
GLUCOSE UR STRIP-MCNC: NEGATIVE MG/DL
HCT VFR BLD AUTO: 26.8 % (ref 35–47)
HGB BLD-MCNC: 8.7 G/DL (ref 11.7–15.7)
HGB BLD-MCNC: 8.9 G/DL (ref 11.7–15.7)
HGB UR QL STRIP: NEGATIVE
KETONES UR STRIP-MCNC: NEGATIVE MG/DL
LEUKOCYTE ESTERASE UR QL STRIP: NEGATIVE
MAGNESIUM SERPL-MCNC: 1.8 MG/DL (ref 1.7–2.3)
MCH RBC QN AUTO: 30.6 PG (ref 26.5–33)
MCHC RBC AUTO-ENTMCNC: 32.5 G/DL (ref 31.5–36.5)
MCV RBC AUTO: 94 FL (ref 78–100)
MUCOUS THREADS #/AREA URNS LPF: PRESENT /LPF
NITRATE UR QL: NEGATIVE
PH UR STRIP: 7 [PH] (ref 5–7)
PLATELET # BLD AUTO: 213 10E3/UL (ref 150–450)
POTASSIUM SERPL-SCNC: 3.3 MMOL/L (ref 3.4–5.3)
POTASSIUM SERPL-SCNC: 3.7 MMOL/L (ref 3.4–5.3)
RBC # BLD AUTO: 2.84 10E6/UL (ref 3.8–5.2)
RBC URINE: 1 /HPF
SODIUM SERPL-SCNC: 132 MMOL/L (ref 135–145)
SP GR UR STRIP: 1.02 (ref 1–1.03)
UROBILINOGEN UR STRIP-MCNC: NORMAL MG/DL
WBC # BLD AUTO: 6.1 10E3/UL (ref 4–11)
WBC URINE: 2 /HPF

## 2024-01-01 PROCEDURE — 85027 COMPLETE CBC AUTOMATED: CPT | Performed by: INTERNAL MEDICINE

## 2024-01-01 PROCEDURE — 36415 COLL VENOUS BLD VENIPUNCTURE: CPT | Performed by: INTERNAL MEDICINE

## 2024-01-01 PROCEDURE — 250N000013 HC RX MED GY IP 250 OP 250 PS 637: Performed by: STUDENT IN AN ORGANIZED HEALTH CARE EDUCATION/TRAINING PROGRAM

## 2024-01-01 PROCEDURE — 250N000013 HC RX MED GY IP 250 OP 250 PS 637: Performed by: INTERNAL MEDICINE

## 2024-01-01 PROCEDURE — 80048 BASIC METABOLIC PNL TOTAL CA: CPT | Performed by: INTERNAL MEDICINE

## 2024-01-01 PROCEDURE — 85018 HEMOGLOBIN: CPT | Performed by: INTERNAL MEDICINE

## 2024-01-01 PROCEDURE — 83735 ASSAY OF MAGNESIUM: CPT | Performed by: ORTHOPAEDIC SURGERY

## 2024-01-01 PROCEDURE — 84132 ASSAY OF SERUM POTASSIUM: CPT | Performed by: INTERNAL MEDICINE

## 2024-01-01 PROCEDURE — 120N000001 HC R&B MED SURG/OB

## 2024-01-01 PROCEDURE — 99232 SBSQ HOSP IP/OBS MODERATE 35: CPT | Performed by: INTERNAL MEDICINE

## 2024-01-01 PROCEDURE — 81001 URINALYSIS AUTO W/SCOPE: CPT | Performed by: INTERNAL MEDICINE

## 2024-01-01 RX ORDER — ACETAMINOPHEN 325 MG/1
650 TABLET ORAL EVERY 4 HOURS PRN
Qty: 30 TABLET | Refills: 0 | Status: SHIPPED | OUTPATIENT
Start: 2024-01-01

## 2024-01-01 RX ORDER — POTASSIUM CHLORIDE 1500 MG/1
40 TABLET, EXTENDED RELEASE ORAL ONCE
Status: COMPLETED | OUTPATIENT
Start: 2024-01-01 | End: 2024-01-01

## 2024-01-01 RX ORDER — AMOXICILLIN 250 MG
1 CAPSULE ORAL 2 TIMES DAILY
Qty: 30 TABLET | Refills: 0 | Status: SHIPPED | OUTPATIENT
Start: 2024-01-01 | End: 2024-01-04

## 2024-01-01 RX ADMIN — ROSUVASTATIN CALCIUM 20 MG: 20 TABLET, FILM COATED ORAL at 20:09

## 2024-01-01 RX ADMIN — ATENOLOL 100 MG: 50 TABLET ORAL at 20:09

## 2024-01-01 RX ADMIN — POLYETHYLENE GLYCOL 3350 17 G: 17 POWDER, FOR SOLUTION ORAL at 09:05

## 2024-01-01 RX ADMIN — SENNOSIDES AND DOCUSATE SODIUM 1 TABLET: 8.6; 5 TABLET ORAL at 20:09

## 2024-01-01 RX ADMIN — SENNOSIDES AND DOCUSATE SODIUM 1 TABLET: 8.6; 5 TABLET ORAL at 09:06

## 2024-01-01 RX ADMIN — LISINOPRIL 10 MG: 10 TABLET ORAL at 09:06

## 2024-01-01 RX ADMIN — APIXABAN 2.5 MG: 2.5 TABLET, FILM COATED ORAL at 20:10

## 2024-01-01 RX ADMIN — APIXABAN 2.5 MG: 2.5 TABLET, FILM COATED ORAL at 09:06

## 2024-01-01 RX ADMIN — POTASSIUM CHLORIDE 40 MEQ: 1500 TABLET, EXTENDED RELEASE ORAL at 09:05

## 2024-01-01 RX ADMIN — ACETAMINOPHEN 650 MG: 325 TABLET, FILM COATED ORAL at 14:20

## 2024-01-01 ASSESSMENT — ACTIVITIES OF DAILY LIVING (ADL)
ADLS_ACUITY_SCORE: 26
ADLS_ACUITY_SCORE: 30
ADLS_ACUITY_SCORE: 26
ADLS_ACUITY_SCORE: 30
ADLS_ACUITY_SCORE: 26

## 2024-01-01 NOTE — PROGRESS NOTES
Care Management Follow Up    Length of Stay (days): 5    Expected Discharge Date: 01/02/2024     Concerns to be Addressed:       Patient plan of care discussed at interdisciplinary rounds: Yes    Anticipated Discharge Disposition: Skilled Nursing Facility     Anticipated Discharge Services:    Anticipated Discharge DME:      Patient/family educated on Medicare website which has current facility and service quality ratings:    Education Provided on the Discharge Plan:    Patient/Family in Agreement with the Plan: yes    Referrals Placed by CM/SW:    Private pay costs discussed: Not applicable    Additional Information:    SW provided pt/family update on TCU search per family request. [facility] declined, [facility] and [facility] are still pending. Pt/family report that [facility] is top choice. Explained that admissions are closed due to the holiday. Pt confirms that her insurance will remain that same in the new year.     NICO Paz, LGSW  Inpatient Care Coordination  Emergency Room /[facility], [facility]

## 2024-01-01 NOTE — PROVIDER NOTIFICATION
MD Notification    Notified Person: MD    Notified Person Name: Dr. Mcneal    Notification Date/Time: 1/1/2024 5258    Notification Interaction: Vocera page    Purpose of Notification: FYI, patient has been feeling lightheaded on and off today. Temp 99.9 (temporal). Just gave her PRN Tylenol. Denies pain while sitting in chair. Encouraging PO fluids.     Orders Received: UA and Hgb ordered by MD.    Comments:

## 2024-01-01 NOTE — PROGRESS NOTES
[facility]  Hospitalist Progress Note  Dea Mcneal MD 01/01/2024    Reason for Stay (Diagnosis):          Assessment and Plan:      Summary of Stay: Mercedes Nelson is a 78 year old female with a history of  htn/hlp, OA with prior R TKA  admitted on 12/27/2023 with right knee pain after a fall and found to have a distal right femur fx.     She underwent ORIF on 12/28/2021    She is doing well but will need TCU at discharge       Problem List:   Mechanical fall complicated by distal right femur fracture  Appreciate Ortho's help, s/p ORIF 12/28  PT/OT and TCU placement   Follow-up with ortho in 6 weeks with Dr Kimball  Can remove kg in 2 weeks    LGF and dizziness today 1/1/2024  VS otherwise stable  Hgb looks good at 8.9  -ck UA    Acute anemia hgb typically in the 13-14 range and was so earlier this month  Iron/IBC/iron sat all low consistent with ACD but I still wonder a bit about a component of JOHN given rapid onset.  I wonder if the large effusion seen on admission film hemarthrosis and that's the reason for her initial drop   And ABL anemia   Hgb 10.6-->8.4.    No ongoing bleeding.   ml  She typically has normal hgb levels.  Admit hgb is anemic and this would be a new anemia. This has been confounded by ABL anemia via surgery  She was transfused a single unit   -follow-up with PCP       Hyponatremia mild and improving.  Not typically hyponatremic.  ? Related to pain from her fall and femur fx     Hypokalemia  Persistent - no culprit medications   Replace and recheck     Htn/hlp  Pta regimen atenolol 100 mg every day, lisinopril 10 mg every day, rosuvastatin 20 mg every day   -resumed atenolol 100 mg every day, lisinopril at 2.5 mg every day, and rosuvastatin  -BPs are high, will increase lisinopril back to baseline which should also help with her hypokalemia         DVT Prophylaxis: DOAC  Code Status: Full Code  Functional Status: neglected to ask   Diet: reg texture  Johnson:not in  "place  Access PIV    Disposition Plan   Expected discharge in 2 days to transitional care unit once bed found .     Entered: Dea Mcneal MD 01/01/2024, 3:29 PM       Securely message with [employer] (more info)  Text page via [employer] Paging/Directory       I spent 35 minutes reviewing epic including prior labs/imaging/medical history and notes related to this encounter  In addition time was spent in interveiwing the patient, communicating with contacts, and medical decision making      Interval History (Subjective):      Feeling ok although soaked her dressing which did need to be changed.  And now with low grade fever and sense of dizziness. No cp, no sob.                   Physical Exam:      Last Vital Signs:  BP (!) 153/66 (BP Location: Right arm)   Pulse 71   Temp 99.9  F (37.7  C) (Temporal)   Resp 16   Ht 1.727 m (5' 8\")   Wt 79.4 kg (175 lb)   SpO2 94%   BMI 26.61 kg/m      I/O:  Pleasant nad looks < stated age head nc/at sclera clear. Normal CV and respiratory status.  Tolerating po. Skin warm and dry no cyanosis or clubbing            Medications:      All current medications were reviewed with changes reflected in problem list.         Data:      All new lab and imaging data was reviewed.   Labs:  Recent Labs   Lab 01/01/24  1323 01/01/24  0608   NA  --  132*   POTASSIUM 3.7 3.3*   CHLORIDE  --  98   CO2  --  26   ANIONGAP  --  8   GLC  --  112*   BUN  --  9.0   CR  --  0.50*   GFRESTIMATED  --  >90   SARAH  --  8.3*     Recent Labs   Lab 01/01/24  1442 01/01/24  0608   WBC  --  6.1   HGB 8.9* 8.7*   HCT  --  26.8*   MCV  --  94   PLT  --  213     Recent Labs   Lab 01/01/24  0608 12/30/23  0715 12/29/23  0836 12/28/23  0600 12/27/23  1427   * 96 112* 114* 108*     No results for input(s): \"AST\", \"ALT\", \"GGT\", \"ALKPHOS\", \"BILITOTAL\", \"BILICONJ\", \"BILIDIRECT\", \"WILTON\" in the last 168 hours.    Invalid input(s): \"BILIRUBININDIRECT\"   Imaging:   Results for orders placed or performed during the " hospital encounter of 12/27/23   XR Femur Right 2 Views    Narrative    Examination:  XR KNEE RIGHT 1/2 VIEWS, XR FEMUR RIGHT 2 VIEWS    Date:  12/27/2023 1:53 PM     Clinical Information: Right knee pain.    Comparison: none.      Impression    Impression:    1.  Acute oblique fracture of the distal femoral metadiaphysis,  moderately displaced, with minimal comminution. The cemented femoral  components are well seated without evidence of loosening. Joint  alignment remains normal. Large knee joint effusion.    2.  No additional femoral fracture. Normal right hip joint alignment  with maintained joint spacing.    BRANDON HOYT MD         SYSTEM ID:  [id number]   XR Knee Right 1/2 Views    Narrative    Examination:  XR KNEE RIGHT 1/2 VIEWS, XR FEMUR RIGHT 2 VIEWS    Date:  12/27/2023 1:53 PM     Clinical Information: Right knee pain.    Comparison: none.      Impression    Impression:    1.  Acute oblique fracture of the distal femoral metadiaphysis,  moderately displaced, with minimal comminution. The cemented femoral  components are well seated without evidence of loosening. Joint  alignment remains normal. Large knee joint effusion.    2.  No additional femoral fracture. Normal right hip joint alignment  with maintained joint spacing.    BRANDON HOYT MD         SYSTEM ID:  [id number]   CT Femur Right w/o Contrast    Narrative    CT RIGHT FEMUR WITHOUT CONTRAST 12/27/2023 2:47 PM    CLINICAL HISTORY: Right thigh pain, known distal femoral periprostatic  fracture. Treatment planning exam.    TECHNIQUE: Multiple contiguous axial CT images were obtained of the  right femur without intravenous contrast. Data was reformatted into  soft tissue and bone algorithms, in coronal and sagittal planes. Dose  reduction techniques were used.    COMPARISON: Femur radiographs 12/27/2023.    FINDINGS:    Distal femoral fractures. There is a primary obliquely oriented  fracture of the distal femoral metadiaphysis.  The distal fracture  component is displaced posterior and proximally relative to the  proximal component, by about 4 cm. There is mild fracture comminution,  with a few cortical fragments present medially and anteriorly.    There is a cemented right total knee arthroplasty. Fracture lines come  to the anterior margin of the cemented femoral component, but there is  no evidence that the component is lucent/ from the femoral  epiphysis.    The right knee joint remains normally aligned. The tibial component  and the patellar resurfacing component remain well seated without  complication.    There is a large knee joint effusion, and moderate soft tissue  swelling around the fracture. There is no drainable hematoma.    Bones are demineralized. No evidence of a discrete lytic or sclerotic  bone lesion.    Moderate degenerative arthritic joint space narrowing and spurring in  the right hip.    Mild generalized atrophy of the right thigh musculature.    Mild femoral artery atherosclerotic calcification.      Impression    IMPRESSION:    1.  Acute distal femoral fracture, a mildly comminuted fracture with  primary oblique orientation, involving the distal femoral  metadiaphysis. The fracture is displaced approximately 4 cm.    2.  Fracture lines come to the anterior margin of the cemented femoral  component for a right total knee arthroplasty. However, components  appear well-seated without evidence of loosening or other  complication.    3.  Moderate soft tissue edema/swelling around the fracture. Large  knee joint effusion.    4.  Right total knee arthroplasty hardware is in place. As noted  above, components are well seated without loosening or other  complication. Right knee joint alignment remains normal.    5.  Bones are demineralized. No concerning bone lesion or evidence of  pathologic fracture.    6.  Moderate degenerative arthritic changes in the right hip. The  right hip joint remains normally  aligned.    BRANDON HOYT MD         SYSTEM ID:  [id number]   XR Surgery AVTAR L/T 5 Min Fluoro w Stills    Narrative    This exam was marked as non-reportable because it will not be read by a   radiologist or a [place] non-radiologist provider.         XR Femur Port Right 2 Views    Narrative    XR FEMUR PORT RIGHT 2 VIEWS 12/28/2023 11:52 AM     HISTORY: retrograde femur    COMPARISON: 12/27/2023      Impression    IMPRESSION: Intramedullary nail, screw and cerclage wire fixation  across the comminuted distal femoral fracture have been placed.  Postoperative air is present. Skin staples are in place. The hardware  is intact. TKA with patellar resurfacing.    NEYDA ANTHONY MD         SYSTEM ID:  [id number]   XR Chest Port 1 View    Narrative    CHEST PORTABLE ONE VIEW  December 29, 2023 9:44 AM     HISTORY: Hypoxia.    COMPARISON: None.      Impression    IMPRESSION: Cardiomegaly. No focal airspace disease. No pulmonary  edema pattern identified at this time.    JAYLAN ARCE MD         SYSTEM ID:  [id number]

## 2024-01-01 NOTE — PLAN OF CARE
[id number]    VSS on RA. A/O x4. Denies pain x RLE w/ activity. NWB RLE, up A2 GB W pivot. External cath at night. Dressing/immobilizer WDL/CDI. K, Mag protocols. Reg diet. Plan for discharge to TCU. Cares ongoing.

## 2024-01-01 NOTE — PLAN OF CARE
Goal Outcome Evaluation:      Plan of Care Reviewed With: patient    Overall Patient Progress: improving    Pt AOX4, denies pain. VSS RA. Voiding via purewick. Up with assist GB and walker. NWB on Rleg.  CMS intact. TCU at discharge.

## 2024-01-01 NOTE — PLAN OF CARE
Goal Outcome Evaluation:      Plan of Care Reviewed With: patient    Overall Patient Progress: improvingOverall Patient Progress: improving    A&Ox4. VSS on RA except HTN and temp max of 99.9 (temporal). Given scheduled BP med and PRN Tylenol. Denies pain. Up with assist of 1, gait belt, and walker to BSC and chair. Voiding via BSC. BM x 1. Family at bedside. CMS intact. Dressing to R knee changed as it became saturated with urine overnight. Immobilizer on. Dressing to R thigh, C/D/I. Mepilex applied to R buttock/hip abrasion. LS clear. C/o intermittent lightheadedness, Dr. Mcneal aware. Hgb 8.9. UA negative. K+ replaced, recheck in AM. IV SL.

## 2024-01-02 ENCOUNTER — APPOINTMENT (OUTPATIENT)
Dept: PHYSICAL THERAPY | Facility: CLINIC | Age: 79
DRG: 480 | End: 2024-01-02
Payer: COMMERCIAL

## 2024-01-02 ENCOUNTER — APPOINTMENT (OUTPATIENT)
Dept: OCCUPATIONAL THERAPY | Facility: CLINIC | Age: 79
DRG: 480 | End: 2024-01-02
Payer: COMMERCIAL

## 2024-01-02 LAB
MAGNESIUM SERPL-MCNC: 2 MG/DL (ref 1.7–2.3)
POTASSIUM SERPL-SCNC: 3.8 MMOL/L (ref 3.4–5.3)

## 2024-01-02 PROCEDURE — 83735 ASSAY OF MAGNESIUM: CPT | Performed by: INTERNAL MEDICINE

## 2024-01-02 PROCEDURE — 97530 THERAPEUTIC ACTIVITIES: CPT | Mod: GO | Performed by: REHABILITATION PRACTITIONER

## 2024-01-02 PROCEDURE — 36415 COLL VENOUS BLD VENIPUNCTURE: CPT | Performed by: INTERNAL MEDICINE

## 2024-01-02 PROCEDURE — 120N000001 HC R&B MED SURG/OB

## 2024-01-02 PROCEDURE — 99231 SBSQ HOSP IP/OBS SF/LOW 25: CPT | Performed by: INTERNAL MEDICINE

## 2024-01-02 PROCEDURE — 250N000013 HC RX MED GY IP 250 OP 250 PS 637: Performed by: STUDENT IN AN ORGANIZED HEALTH CARE EDUCATION/TRAINING PROGRAM

## 2024-01-02 PROCEDURE — 97530 THERAPEUTIC ACTIVITIES: CPT | Mod: GP

## 2024-01-02 PROCEDURE — 250N000013 HC RX MED GY IP 250 OP 250 PS 637: Performed by: INTERNAL MEDICINE

## 2024-01-02 PROCEDURE — 84132 ASSAY OF SERUM POTASSIUM: CPT | Performed by: INTERNAL MEDICINE

## 2024-01-02 RX ADMIN — ROSUVASTATIN CALCIUM 20 MG: 20 TABLET, FILM COATED ORAL at 20:51

## 2024-01-02 RX ADMIN — ACETAMINOPHEN 650 MG: 325 TABLET, FILM COATED ORAL at 20:50

## 2024-01-02 RX ADMIN — POLYETHYLENE GLYCOL 3350 17 G: 17 POWDER, FOR SOLUTION ORAL at 08:08

## 2024-01-02 RX ADMIN — APIXABAN 2.5 MG: 2.5 TABLET, FILM COATED ORAL at 20:51

## 2024-01-02 RX ADMIN — APIXABAN 2.5 MG: 2.5 TABLET, FILM COATED ORAL at 08:08

## 2024-01-02 RX ADMIN — LISINOPRIL 10 MG: 10 TABLET ORAL at 08:08

## 2024-01-02 RX ADMIN — ATENOLOL 100 MG: 50 TABLET ORAL at 20:51

## 2024-01-02 RX ADMIN — SENNOSIDES AND DOCUSATE SODIUM 1 TABLET: 8.6; 5 TABLET ORAL at 08:08

## 2024-01-02 RX ADMIN — ACETAMINOPHEN 650 MG: 325 TABLET, FILM COATED ORAL at 02:30

## 2024-01-02 ASSESSMENT — ACTIVITIES OF DAILY LIVING (ADL)
ADLS_ACUITY_SCORE: 30
ADLS_ACUITY_SCORE: 28
ADLS_ACUITY_SCORE: 30
ADLS_ACUITY_SCORE: 28
ADLS_ACUITY_SCORE: 32
ADLS_ACUITY_SCORE: 28
ADLS_ACUITY_SCORE: 30
ADLS_ACUITY_SCORE: 28

## 2024-01-02 NOTE — CARE PLAN
"Time of Care: 1912-1115     Orientation: A/O x 4   VS: /64 (BP Location: Right arm, Patient Position: Semi-Whitten's, Cuff Size: Adult Regular)   Pulse 74   Temp 99.5  F (37.5  C) (Temporal)   Resp 16   Ht 1.727 m (5' 8\")   Wt 79.4 kg (175 lb)   SpO2 98%   BMI 26.61 kg/m    Pain: Denies pain during time of care  Activity: Ax 1 W & GB   Resp: Wnl   GI: Wnl Pt. Stated had 2 BM today   : voiding spontaneously   Skin: R- Hip surgical site; clean, dry, intact with immobilizer   Lines: PIV- SL   Diet: Reg   Plan: TCU   Discharge: TBD     "

## 2024-01-02 NOTE — PLAN OF CARE
"  Assumed care of patient around 11 pm  Orientation: A&Ox4, able to make needs known   Pain: c/o aching in right leg 3/10, given PRN tylenol   Diet:tolerating reg diet, adequate appetite per pt  Activity:Ax1 with g/w  LDA's: PIV flushed/SL  GI/: voiding spontaneously, BS active, no BM during shift, denies N/V  Skin: R surgical incis, mepilex on saccral  r/t redness  Resp: Stable on RA  Labs: K, mg protocols, AM draws  Current plan of care: Awaiting placement for discharge     /62 (BP Location: Right arm)   Pulse 70   Temp 99  F (37.2  C) (Temporal)   Resp 18   Ht 1.727 m (5' 8\")   Wt 79.4 kg (175 lb)   SpO2 98%   BMI 26.61 kg/m     "

## 2024-01-02 NOTE — PROGRESS NOTES
[facility]  Hospitalist Progress Note  Dea Mcneal MD 01/02/2024    Reason for Stay (Diagnosis):          Assessment and Plan:      Summary of Stay: Mercedes Nelson is a 78 year old female with a history of  htn/hlp, OA with prior R TKA  admitted on 12/27/2023 with right knee pain after a fall and found to have a distal right femur fx.     She underwent ORIF on 12/28/2021    She is doing well but will need TCU at discharge       Problem List:   Mechanical fall complicated by distal right femur fracture  Appreciate Ortho's help, s/p ORIF 12/28  PT/OT and TCU placement   Follow-up with ortho in 6 weeks with Dr Kimball  Can remove kg in 2 weeks    LGF and dizziness 1/1/2024  VS otherwise stable  Hgb looks good at 8.9  cked  UA and without inflammatory findings  -now resolved     Acute anemia hgb typically in the 13-14 range and was so earlier this month  Iron/IBC/iron sat all low consistent with ACD but I still wonder a bit about a component of JOHN given rapid onset.  I wonder if the large effusion seen on admission film hemarthrosis and that's the reason for her initial drop   And ABL anemia   Hgb 10.6-->8.4.    No ongoing bleeding.   ml  She typically has normal hgb levels.  Admit hgb is anemic and this would be a new anemia. This has been confounded by ABL anemia via surgery  She was transfused a single unit   -follow-up with PCP       Hyponatremia mild and improving.  Not typically hyponatremic.  ? Related to pain from her fall and femur fx     Hypokalemia  Persistent - no culprit medications   Replace and recheck     Htn/hlp  Pta regimen atenolol 100 mg every day, lisinopril 10 mg every day, rosuvastatin 20 mg every day   -resumed atenolol 100 mg every day, lisinopril at 2.5 mg every day, and rosuvastatin  -BPs are high, will increase lisinopril back to baseline which should also help with her hypokalemia         DVT Prophylaxis: DOAC  Code Status: Full Code  Functional Status: neglected to  "ask   Diet: reg texture  Johnson:not in place  Access PIV    Disposition Plan   Expected discharge in 1 days to transitional care unit now that bed has been found .     Entered: Dea Mcneal MD 01/02/2024, 4:19 PM       Securely message with [employer] (more info)  Text page via [employer] Paging/Directory       I spent 25 minutes reviewing epic including prior labs/imaging/medical history and notes related to this encounter  In addition time was spent in interveiwing the patient, communicating with contacts, and medical decision making      Interval History (Subjective):      Feeling ok although soaked her dressing which did need to be changed.  And now with low grade fever and sense of dizziness. No cp, no sob.                   Physical Exam:      Last Vital Signs:  BP (!) 153/76 (BP Location: Right arm)   Pulse 69   Temp 99.7  F (37.6  C) (Temporal)   Resp 16   Ht 1.727 m (5' 8\")   Wt 79.4 kg (175 lb)   SpO2 94%   BMI 26.61 kg/m      I/O:  Pleasant nad looks < stated age head nc/at sclera clear. Normal CV and respiratory status.  Tolerating po. Skin warm and dry no cyanosis or clubbing            Medications:      All current medications were reviewed with changes reflected in problem list.         Data:      All new lab and imaging data was reviewed.   Labs:  Recent Labs   Lab 01/02/24  0735 01/01/24  1323 01/01/24  0608   NA  --   --  132*   POTASSIUM 3.8   < > 3.3*   CHLORIDE  --   --  98   CO2  --   --  26   ANIONGAP  --   --  8   GLC  --   --  112*   BUN  --   --  9.0   CR  --   --  0.50*   GFRESTIMATED  --   --  >90   SARAH  --   --  8.3*    < > = values in this interval not displayed.     Recent Labs   Lab 01/01/24  1442 01/01/24  0608   WBC  --  6.1   HGB 8.9* 8.7*   HCT  --  26.8*   MCV  --  94   PLT  --  213     Recent Labs   Lab 01/01/24  0608 12/30/23  0715 12/29/23  0836 12/28/23  0600 12/27/23  1427   * 96 112* 114* 108*     No results for input(s): \"AST\", \"ALT\", \"GGT\", \"ALKPHOS\", \"BILITOTAL\", " "\"BILICONJ\", \"BILIDIRECT\", \"WILTON\" in the last 168 hours.    Invalid input(s): \"BILIRUBININDIRECT\"   Imaging:   Results for orders placed or performed during the hospital encounter of 12/27/23   XR Femur Right 2 Views    Narrative    Examination:  XR KNEE RIGHT 1/2 VIEWS, XR FEMUR RIGHT 2 VIEWS    Date:  12/27/2023 1:53 PM     Clinical Information: Right knee pain.    Comparison: none.      Impression    Impression:    1.  Acute oblique fracture of the distal femoral metadiaphysis,  moderately displaced, with minimal comminution. The cemented femoral  components are well seated without evidence of loosening. Joint  alignment remains normal. Large knee joint effusion.    2.  No additional femoral fracture. Normal right hip joint alignment  with maintained joint spacing.    BRANDON HOYT MD         SYSTEM ID:  [id number]   XR Knee Right 1/2 Views    Narrative    Examination:  XR KNEE RIGHT 1/2 VIEWS, XR FEMUR RIGHT 2 VIEWS    Date:  12/27/2023 1:53 PM     Clinical Information: Right knee pain.    Comparison: none.      Impression    Impression:    1.  Acute oblique fracture of the distal femoral metadiaphysis,  moderately displaced, with minimal comminution. The cemented femoral  components are well seated without evidence of loosening. Joint  alignment remains normal. Large knee joint effusion.    2.  No additional femoral fracture. Normal right hip joint alignment  with maintained joint spacing.    BRANDON HOYT MD         SYSTEM ID:  [id number]   CT Femur Right w/o Contrast    Narrative    CT RIGHT FEMUR WITHOUT CONTRAST 12/27/2023 2:47 PM    CLINICAL HISTORY: Right thigh pain, known distal femoral periprostatic  fracture. Treatment planning exam.    TECHNIQUE: Multiple contiguous axial CT images were obtained of the  right femur without intravenous contrast. Data was reformatted into  soft tissue and bone algorithms, in coronal and sagittal planes. Dose  reduction techniques were " used.    COMPARISON: Femur radiographs 12/27/2023.    FINDINGS:    Distal femoral fractures. There is a primary obliquely oriented  fracture of the distal femoral metadiaphysis. The distal fracture  component is displaced posterior and proximally relative to the  proximal component, by about 4 cm. There is mild fracture comminution,  with a few cortical fragments present medially and anteriorly.    There is a cemented right total knee arthroplasty. Fracture lines come  to the anterior margin of the cemented femoral component, but there is  no evidence that the component is lucent/ from the femoral  epiphysis.    The right knee joint remains normally aligned. The tibial component  and the patellar resurfacing component remain well seated without  complication.    There is a large knee joint effusion, and moderate soft tissue  swelling around the fracture. There is no drainable hematoma.    Bones are demineralized. No evidence of a discrete lytic or sclerotic  bone lesion.    Moderate degenerative arthritic joint space narrowing and spurring in  the right hip.    Mild generalized atrophy of the right thigh musculature.    Mild femoral artery atherosclerotic calcification.      Impression    IMPRESSION:    1.  Acute distal femoral fracture, a mildly comminuted fracture with  primary oblique orientation, involving the distal femoral  metadiaphysis. The fracture is displaced approximately 4 cm.    2.  Fracture lines come to the anterior margin of the cemented femoral  component for a right total knee arthroplasty. However, components  appear well-seated without evidence of loosening or other  complication.    3.  Moderate soft tissue edema/swelling around the fracture. Large  knee joint effusion.    4.  Right total knee arthroplasty hardware is in place. As noted  above, components are well seated without loosening or other  complication. Right knee joint alignment remains normal.    5.  Bones are demineralized.  No concerning bone lesion or evidence of  pathologic fracture.    6.  Moderate degenerative arthritic changes in the right hip. The  right hip joint remains normally aligned.    BRANDON HOYT MD         SYSTEM ID:  [id number]   XR Surgery AVTAR L/T 5 Min Fluoro w Stills    Narrative    This exam was marked as non-reportable because it will not be read by a   radiologist or a [facility] non-radiologist provider.         XR Femur Port Right 2 Views    Narrative    XR FEMUR PORT RIGHT 2 VIEWS 12/28/2023 11:52 AM     HISTORY: retrograde femur    COMPARISON: 12/27/2023      Impression    IMPRESSION: Intramedullary nail, screw and cerclage wire fixation  across the comminuted distal femoral fracture have been placed.  Postoperative air is present. Skin staples are in place. The hardware  is intact. TKA with patellar resurfacing.    NEYDA ANTHONY MD         SYSTEM ID:  [id number]   XR Chest Port 1 View    Narrative    CHEST PORTABLE ONE VIEW  December 29, 2023 9:44 AM     HISTORY: Hypoxia.    COMPARISON: None.      Impression    IMPRESSION: Cardiomegaly. No focal airspace disease. No pulmonary  edema pattern identified at this time.    JAYLAN ARCE MD         SYSTEM ID:  [id number]

## 2024-01-02 NOTE — PROGRESS NOTES
"BP (!) 153/76 (BP Location: Right arm)   Pulse 69   Temp 99.7  F (37.6  C) (Temporal)   Resp 16   Ht 1.727 m (5' 8\")   Wt 79.4 kg (175 lb)   SpO2 94%   BMI 26.61 kg/m       Denies pain. Dressings CDI. Immobilizer on. CMS intact. Tolerating regular diet. Voiding via commode. A1 walker gait belt with RLE NWB. Plan for discharge to TCU tomorrow.   "

## 2024-01-02 NOTE — PROGRESS NOTES
Care Management Follow Up    Length of Stay (days): 6    Expected Discharge Date: 1/3/24     Patient plan of care discussed at interdisciplinary rounds: Yes    Anticipated Discharge Disposition: Skilled Nursing Facility     Patient/Family in Agreement with the Plan: yes    Private pay costs discussed: Not applicable    Additional Information:  Pt's TCU referral to [facility] was declined. Referrals to [facility] and [facility] are still pending. Called [facility] and, they are still reviewing referral and will call back once they have a decision. Called and spoke with [facility] TCU, they are also still reviewing and will call back with a decision. [facility] admissions informed me that they will not have any beds available regardless until earlier Thurs 1/4 or Fri 1/5. Will wait to hear back from facilities.       ADDENDUM 1/2/24 1420: [facility] in admissions called has accepted patient for admission to TCU tomorrow after 5pm. This will be an admit to a private room with no out of pocket private room fees since patient had requested a shared room and this is all that is available. [facility] does has COVID-19 on the unit and needs to make sure pt is aware and ok with this. Met with pt at bedside, explained about the COVID-19 at [facility] and she and spouse are ok with this. They will accept this bed for admission tomorrow after 5pm. Pt/spouse requesting [facility] w/c transport which was previously discussed. [facility] w/c transport has been set up for pickup tomorrow 1/3/24 from [id number]. Patient states she is ok with being followed by a [facility] provider while at TCU. Hospitalist, bedside RN, charge nurse, and admissions at [facility] notified.       Georgia Reyes, RN  Care Coordinator  [facility]   [facility]  [phone]

## 2024-01-03 VITALS
WEIGHT: 175 LBS | TEMPERATURE: 98.3 F | OXYGEN SATURATION: 95 % | DIASTOLIC BLOOD PRESSURE: 86 MMHG | HEART RATE: 65 BPM | SYSTOLIC BLOOD PRESSURE: 156 MMHG | HEIGHT: 68 IN | BODY MASS INDEX: 26.52 KG/M2 | RESPIRATION RATE: 16 BRPM

## 2024-01-03 LAB
ANION GAP SERPL CALCULATED.3IONS-SCNC: 8 MMOL/L (ref 7–15)
BUN SERPL-MCNC: 12.9 MG/DL (ref 8–23)
CALCIUM SERPL-MCNC: 8.4 MG/DL (ref 8.8–10.2)
CHLORIDE SERPL-SCNC: 99 MMOL/L (ref 98–107)
CREAT SERPL-MCNC: 0.59 MG/DL (ref 0.51–0.95)
DEPRECATED HCO3 PLAS-SCNC: 27 MMOL/L (ref 22–29)
EGFRCR SERPLBLD CKD-EPI 2021: >90 ML/MIN/1.73M2
ERYTHROCYTE [DISTWIDTH] IN BLOOD BY AUTOMATED COUNT: 16 % (ref 10–15)
GLUCOSE SERPL-MCNC: 104 MG/DL (ref 70–99)
HCT VFR BLD AUTO: 26.8 % (ref 35–47)
HGB BLD-MCNC: 8.6 G/DL (ref 11.7–15.7)
MAGNESIUM SERPL-MCNC: 1.9 MG/DL (ref 1.7–2.3)
MCH RBC QN AUTO: 31 PG (ref 26.5–33)
MCHC RBC AUTO-ENTMCNC: 32.1 G/DL (ref 31.5–36.5)
MCV RBC AUTO: 97 FL (ref 78–100)
PLATELET # BLD AUTO: 244 10E3/UL (ref 150–450)
POTASSIUM SERPL-SCNC: 3.6 MMOL/L (ref 3.4–5.3)
RBC # BLD AUTO: 2.77 10E6/UL (ref 3.8–5.2)
SODIUM SERPL-SCNC: 134 MMOL/L (ref 135–145)
WBC # BLD AUTO: 6.5 10E3/UL (ref 4–11)

## 2024-01-03 PROCEDURE — 80048 BASIC METABOLIC PNL TOTAL CA: CPT | Performed by: INTERNAL MEDICINE

## 2024-01-03 PROCEDURE — 250N000013 HC RX MED GY IP 250 OP 250 PS 637: Performed by: STUDENT IN AN ORGANIZED HEALTH CARE EDUCATION/TRAINING PROGRAM

## 2024-01-03 PROCEDURE — 99239 HOSP IP/OBS DSCHRG MGMT >30: CPT | Performed by: INTERNAL MEDICINE

## 2024-01-03 PROCEDURE — 85027 COMPLETE CBC AUTOMATED: CPT | Performed by: INTERNAL MEDICINE

## 2024-01-03 PROCEDURE — 36415 COLL VENOUS BLD VENIPUNCTURE: CPT | Performed by: INTERNAL MEDICINE

## 2024-01-03 PROCEDURE — 83735 ASSAY OF MAGNESIUM: CPT | Performed by: INTERNAL MEDICINE

## 2024-01-03 RX ORDER — IBUPROFEN 600 MG/1
600 TABLET, FILM COATED ORAL EVERY 8 HOURS PRN
Start: 2024-01-18 | End: 2024-01-04

## 2024-01-03 RX ORDER — ASPIRIN 325 MG
325 TABLET, DELAYED RELEASE (ENTERIC COATED) ORAL 2 TIMES DAILY
Start: 2024-01-18 | End: 2024-05-03

## 2024-01-03 RX ADMIN — APIXABAN 2.5 MG: 2.5 TABLET, FILM COATED ORAL at 08:46

## 2024-01-03 RX ADMIN — ACETAMINOPHEN 650 MG: 325 TABLET, FILM COATED ORAL at 15:54

## 2024-01-03 ASSESSMENT — ACTIVITIES OF DAILY LIVING (ADL)
ADLS_ACUITY_SCORE: 31
ADLS_ACUITY_SCORE: 29
ADLS_ACUITY_SCORE: 31
ADLS_ACUITY_SCORE: 29
ADLS_ACUITY_SCORE: 29

## 2024-01-03 NOTE — PLAN OF CARE
A&Ox4. VSS on room air, afebrile. Denies pain this shift. Immobilizer in place. NWB to right side, up Ax2 using the bedside commode.

## 2024-01-03 NOTE — PLAN OF CARE
Goal Outcome Evaluation:    Patient alert and oriented x4. Reports little/no pain. Dressing C/D/I. CMS+. Non weight bearing on RLE. Assist of 2 with walker and gait belt. Knee immobilizer on at all times. Up to commode. Regular diet.     Discharge packet given to patient and spouse. Questions answered. Patient belongings sent with patient. Discharged to TCU via [facility] transport.

## 2024-01-03 NOTE — DISCHARGE SUMMARY
Discharge Summary  Hospitalist Service    Mercedes Nelson MRN# [id number]   YOB: 1945 Age: 78 year old     Date of Admission:  12/27/2023  Date of Discharge:  1/3/2024  Admitting Physician:  Harry Mcdonald DO  Discharge Physician: Dea Mcneal MD  Discharging Service: Hospitalist Service     Primary Provider: Bill Castro  Primary Care Physician Phone Number: [phone]         Discharge Diagnoses/Problem Oriented Hospital Course (Providers):      Mercedes Nelson was admitted on 12/27/2023 by Harry Mcdonald DO and I would refer you to their history and physical.  The following problems were addressed during her hospitalization:    Mechanical fall complicated by femur fx  ABL anemia   Hyponatremia/hypokalemia   Htn/hlp  LGF and dizziness    Summary of Stay: Mercedes Nelson is a 78 year old female with a history of  htn/hlp, OA with prior R TKA  admitted on 12/27/2023 with right knee pain after a fall and found to have a distal right femur fx.      She underwent ORIF on 12/28/2021     She is doing well but will need TCU at discharge         Problem List:   Mechanical fall complicated by distal right femur fracture  Appreciate Ortho's help, s/p ORIF 12/28  PT/OT and TCU placement   NWB RLE  Follow-up with ortho in 6 weeks with Dr Kimball  Can remove kg in 2 weeks     LGF and dizziness 1/1/2024  VS otherwise stable  Hgb looks good at 8.9  cked  UA and without inflammatory findings  -now resolved      Acute anemia hgb typically in the 13-14 range and was so earlier this month  Iron/IBC/iron sat all low consistent with ACD but I still wonder a bit about a component of JOHN given rapid onset.  I wonder if the large effusion seen on admission film hemarthrosis and that's the reason for her initial drop   And ABL anemia   Hgb 10.6-->8.4.    No ongoing bleeding.   ml  She typically has normal hgb levels.  Admit hgb is anemic and this would be a new anemia. This has been  confounded by ABL anemia via surgery  She was transfused a single unit   -follow-up with PCP      Hyponatremia mild and improving.  Not typically hyponatremic.  ? Related to pain from her fall and femur fx   -booker in one week     Hypokalemia  Persistent - no culprit medications   Replace and recheck      Htn/hlp  Pta regimen atenolol 100 mg every day, lisinopril 10 mg every day, rosuvastatin 20 mg every day   -resumed atenolol 100 mg every day, lisinopril at 2.5 mg every day, and rosuvastatin  -BPs are high, will increase lisinopril back to baseline which should also help with her hypokalemia        DVT Prophylaxis: DOAC  Code Status: Full Code  Functional Status: neglected to ask   Diet: reg texture  Johnson:not in place         Code Status:      Full Code        Brief Hospital Stay Summary Sent Home With Patient in AVS:          Reason for your hospital stay      Femur fracture                     Important Results:        As noted below          Pending Results:        Unresulted Labs Ordered in the Past 30 Days of this Admission       No orders found from 11/27/2023 to 12/28/2023.              Discharge Instructions and Follow-Up:      Follow-up Appointments     Follow Up and recommended labs and tests      Please call the clinic to schedule an appointment with Dr. Kimball in 2   weeks in clinic.. [facility] [address] [place] [address] Phone: [phone].        Follow Up and recommended labs and tests      Follow-up with [facility] MD/NP in 2-3 weeks  Booker hgb and bmp in one week with results to [facility] MD/NP              Discharge Disposition:        Discharged to home         Discharge Medications:        Current Discharge Medication List        START taking these medications    Details   apixaban ANTICOAGULANT (ELIQUIS) 2.5 MG tablet Take 1 tablet (2.5 mg) by mouth 2 times daily  Qty: 30 tablet, Refills: 0    Associated Diagnoses: Closed fracture of distal end of right femur, unspecified fracture  morphology, initial encounter (H)           CONTINUE these medications which have CHANGED    Details   acetaminophen (TYLENOL) 325 MG tablet Take 2 tablets (650 mg) by mouth every 4 hours as needed for other (For optimal non-opioid multimodal pain management to improve pain control.)  Qty: 30 tablet, Refills: 0    Associated Diagnoses: Closed fracture of distal end of right femur, unspecified fracture morphology, initial encounter (H)      aspirin (ASA) 325 MG EC tablet Take 1 tablet (325 mg) by mouth 2 times daily Resume when apixaban completed    Associated Diagnoses: Closed fracture of distal end of right femur, unspecified fracture morphology, initial encounter (H)      ibuprofen (ADVIL/MOTRIN) 600 MG tablet Take 1 tablet (600 mg) by mouth every 8 hours as needed for moderate pain Can resume when apixaban completed    Associated Diagnoses: Closed fracture of distal end of right femur, unspecified fracture morphology, initial encounter (H)      senna-docusate (SENOKOT-S/PERICOLACE) 8.6-50 MG tablet Take 1 tablet by mouth 2 times daily  Qty: 30 tablet, Refills: 0    Associated Diagnoses: Closed fracture of distal end of right femur, unspecified fracture morphology, initial encounter (H)           CONTINUE these medications which have NOT CHANGED    Details   atenolol (TENORMIN) 100 MG tablet Take 100 mg by mouth daily      B Complex Vitamins (VITAMIN  B COMPLEX) tablet Take 1 tablet by mouth daily      clobetasol (TEMOVATE) 0.05 % external ointment Apply thin layer to affected area daily      Coenzyme Q10 (COQ-10 PO) Take 1 tablet by mouth daily      hydrOXYzine HCl (ATARAX) 10 MG tablet Take 10 mg by mouth every 8 hours as needed for itching      lisinopril (ZESTRIL) 10 MG tablet Take 1 tablet (10 mg) by mouth daily for 30 days  Qty: 30 tablet, Refills: 0      Multiple Vitamin (MULTI-VITAMIN) per tablet Take 1 tablet by mouth daily      prednisoLONE acetate (PRED FORTE) 1 % ophthalmic suspension Place 1 drop into  "both eyes 4 times daily      rosuvastatin (CRESTOR) 20 MG tablet Take 20 mg by mouth daily      VITAMIN D, CHOLECALCIFEROL, PO Take 1 capsule by mouth daily           STOP taking these medications       oxyCODONE (OXY-IR) 5 MG capsule Comments:   Reason for Stopping:                 Allergies:         Allergies   Allergen Reactions    Iodine-131 Angioedema, Hives and Itching           Consultations This Hospital Stay:        Ortho, SW/PT/OT         Condition and Physical on Discharge:        Discharge condition: Stable   Vitals: Blood pressure (!) 142/69, pulse 65, temperature 98.9  F (37.2  C), temperature source Temporal, resp. rate 16, height 1.727 m (5' 8\"), weight 79.4 kg (175 lb), SpO2 95%.     Constitutional: Pleasant nad looks < stated age head nc/at sclera clear     Lungs: Normal respiratory effort   Cardiovascular: Normal CV status   Abdomen: Tolerating po    Skin: Warm and dry no cc   Other: Alert and oriented affect appropriate sevilla          Discharge Time:      Greater than 30 minutes.        Image Results From This Hospital Stay (For Non-EPIC Providers):        Results for orders placed or performed during the hospital encounter of 12/27/23   XR Femur Right 2 Views    Narrative    Examination:  XR KNEE RIGHT 1/2 VIEWS, XR FEMUR RIGHT 2 VIEWS    Date:  12/27/2023 1:53 PM     Clinical Information: Right knee pain.    Comparison: none.      Impression    Impression:    1.  Acute oblique fracture of the distal femoral metadiaphysis,  moderately displaced, with minimal comminution. The cemented femoral  components are well seated without evidence of loosening. Joint  alignment remains normal. Large knee joint effusion.    2.  No additional femoral fracture. Normal right hip joint alignment  with maintained joint spacing.    BRANDON HOYT MD         SYSTEM ID:  [id number]   XR Knee Right 1/2 Views    Narrative    Examination:  XR KNEE RIGHT 1/2 VIEWS, XR FEMUR RIGHT 2 VIEWS    Date:  12/27/2023 1:53 " PM     Clinical Information: Right knee pain.    Comparison: none.      Impression    Impression:    1.  Acute oblique fracture of the distal femoral metadiaphysis,  moderately displaced, with minimal comminution. The cemented femoral  components are well seated without evidence of loosening. Joint  alignment remains normal. Large knee joint effusion.    2.  No additional femoral fracture. Normal right hip joint alignment  with maintained joint spacing.    BRANDON HOYT MD         SYSTEM ID:  [id number]   CT Femur Right w/o Contrast    Narrative    CT RIGHT FEMUR WITHOUT CONTRAST 12/27/2023 2:47 PM    CLINICAL HISTORY: Right thigh pain, known distal femoral periprostatic  fracture. Treatment planning exam.    TECHNIQUE: Multiple contiguous axial CT images were obtained of the  right femur without intravenous contrast. Data was reformatted into  soft tissue and bone algorithms, in coronal and sagittal planes. Dose  reduction techniques were used.    COMPARISON: Femur radiographs 12/27/2023.    FINDINGS:    Distal femoral fractures. There is a primary obliquely oriented  fracture of the distal femoral metadiaphysis. The distal fracture  component is displaced posterior and proximally relative to the  proximal component, by about 4 cm. There is mild fracture comminution,  with a few cortical fragments present medially and anteriorly.    There is a cemented right total knee arthroplasty. Fracture lines come  to the anterior margin of the cemented femoral component, but there is  no evidence that the component is lucent/ from the femoral  epiphysis.    The right knee joint remains normally aligned. The tibial component  and the patellar resurfacing component remain well seated without  complication.    There is a large knee joint effusion, and moderate soft tissue  swelling around the fracture. There is no drainable hematoma.    Bones are demineralized. No evidence of a discrete lytic or sclerotic  bone  lesion.    Moderate degenerative arthritic joint space narrowing and spurring in  the right hip.    Mild generalized atrophy of the right thigh musculature.    Mild femoral artery atherosclerotic calcification.      Impression    IMPRESSION:    1.  Acute distal femoral fracture, a mildly comminuted fracture with  primary oblique orientation, involving the distal femoral  metadiaphysis. The fracture is displaced approximately 4 cm.    2.  Fracture lines come to the anterior margin of the cemented femoral  component for a right total knee arthroplasty. However, components  appear well-seated without evidence of loosening or other  complication.    3.  Moderate soft tissue edema/swelling around the fracture. Large  knee joint effusion.    4.  Right total knee arthroplasty hardware is in place. As noted  above, components are well seated without loosening or other  complication. Right knee joint alignment remains normal.    5.  Bones are demineralized. No concerning bone lesion or evidence of  pathologic fracture.    6.  Moderate degenerative arthritic changes in the right hip. The  right hip joint remains normally aligned.    BRANDON HOYT MD         SYSTEM ID:  [id number]   XR Surgery AVTAR L/T 5 Min Fluoro w Stills    Narrative    This exam was marked as non-reportable because it will not be read by a   radiologist or a [place] non-radiologist provider.         XR Femur Port Right 2 Views    Narrative    XR FEMUR PORT RIGHT 2 VIEWS 12/28/2023 11:52 AM     HISTORY: retrograde femur    COMPARISON: 12/27/2023      Impression    IMPRESSION: Intramedullary nail, screw and cerclage wire fixation  across the comminuted distal femoral fracture have been placed.  Postoperative air is present. Skin staples are in place. The hardware  is intact. TKA with patellar resurfacing.    NEYDA ANTHONY MD         SYSTEM ID:  [id number]   XR Chest Port 1 View    Narrative    CHEST PORTABLE ONE VIEW  December 29, 2023 9:44 AM      HISTORY: Hypoxia.    COMPARISON: None.      Impression    IMPRESSION: Cardiomegaly. No focal airspace disease. No pulmonary  edema pattern identified at this time.    JAYLAN ARCE MD         SYSTEM ID:  [id number]           Most Recent Lab Results In EPIC (For Non-EPIC Providers):    Most Recent 3 CBC's:  Recent Labs   Lab Test 01/03/24  0604 01/01/24  1442 01/01/24  0608 12/30/23  0715   WBC 6.5  --  6.1 6.6   HGB 8.6* 8.9* 8.7* 8.1*   MCV 97  --  94 95     --  213 170      Most Recent 3 BMP's:  Recent Labs   Lab Test 01/03/24  0604 01/02/24  0735 01/01/24  1323 01/01/24  0608 12/31/23  0721 12/30/23  0715   *  --   --  132*  --  134*   POTASSIUM 3.6 3.8 3.7 3.3*   < > 3.9  3.9   CHLORIDE 99  --   --  98  --  101   CO2 27  --   --  26  --  25   BUN 12.9  --   --  9.0  --  11.3   CR 0.59  --   --  0.50*  --  0.50*   ANIONGAP 8  --   --  8  --  8   SARAH 8.4*  --   --  8.3*  --  7.9*   *  --   --  112*  --  96    < > = values in this interval not displayed.       Most Recent 3 INR's:  Recent Labs   Lab Test 12/27/23  1427   INR 1.09   Most Recent Cholesterol Panel:  Recent Labs   Lab Test 02/13/23  0000   TRIG 66

## 2024-01-03 NOTE — PLAN OF CARE
"Physical Therapy Discharge Summary    Reason for therapy discharge:    Discharged to transitional care facility.    Progress towards therapy goal(s). See goals on Care Plan in [facility] electronic health record for goal details.  Goals not met.  Barriers to achieving goals:   discharge from facility.    Therapy recommendation(s):    Continued therapy is recommended.  Rationale/Recommendations:  Per prior PT recommendation, \"Patient presents below baseline for functional mobility. Patient unable to maintain NWB during transfers consistently, unable to ambulate at this time. House is multi level and has stairs to enter. Recommend discharge to TCU in order to increase strength, activity tolerance and independence with mobility\".      "

## 2024-01-03 NOTE — PROGRESS NOTES
Care Management Discharge Note    Discharge Date: 01/03/2024       Discharge Disposition: Skilled Nursing Facility    Discharge Transportation: health plan transportation    Does the patient's insurance plan have a 3 day qualifying hospital stay waiver?  No    PAS Confirmation Code: [id number]  Patient/family educated on Medicare website which has current facility and service quality ratings:      Education Provided on the Discharge Plan:    Persons Notified of Discharge Plans: [facility] admissions, bedside RN, charge nurse, hospitalist  Patient/Family in Agreement with the Plan: yes    Additional Information:  Call from [facility] TCU to update us that that have received authorization from [facility] for admission to TCU today. [facility] w/c transport has been previously arranged for  today 1/3/24 from [id number] to transport patient to [facility]. Called [facility] admissions and updated them that patient is still on track for admission later today as planned. Discharge orders have been signed and faxed to TCU. No further CM needs noted.       Georgia Reyes, RN  Care Coordinator  [facility]  [phone]         Dr Wright

## 2024-01-03 NOTE — PLAN OF CARE
Inpatient progress note [id number]  R) leg w/ 2/10 pain, tylenol given. Dressings CDI. Immobilizer on.+CMS. sitting in chair, up to BSC & back to bed w/ A1 walker gait belt with RLE NWB. Plan for discharge to TCU tomorrow.

## 2024-01-04 ENCOUNTER — TRANSITIONAL CARE UNIT VISIT (OUTPATIENT)
Dept: GERIATRICS | Facility: CLINIC | Age: 79
End: 2024-01-04
Payer: COMMERCIAL

## 2024-01-04 VITALS
OXYGEN SATURATION: 98 % | HEART RATE: 60 BPM | TEMPERATURE: 99.7 F | BODY MASS INDEX: 26.52 KG/M2 | WEIGHT: 175 LBS | SYSTOLIC BLOOD PRESSURE: 153 MMHG | DIASTOLIC BLOOD PRESSURE: 72 MMHG | HEIGHT: 68 IN | RESPIRATION RATE: 14 BRPM

## 2024-01-04 DIAGNOSIS — R53.81 PHYSICAL DECONDITIONING: ICD-10-CM

## 2024-01-04 DIAGNOSIS — K59.03 DRUG-INDUCED CONSTIPATION: ICD-10-CM

## 2024-01-04 DIAGNOSIS — I10 BENIGN ESSENTIAL HYPERTENSION: ICD-10-CM

## 2024-01-04 DIAGNOSIS — E87.1 HYPONATREMIA: ICD-10-CM

## 2024-01-04 DIAGNOSIS — D62 ANEMIA DUE TO BLOOD LOSS, ACUTE: ICD-10-CM

## 2024-01-04 DIAGNOSIS — S72.401A CLOSED FRACTURE OF DISTAL END OF RIGHT FEMUR, UNSPECIFIED FRACTURE MORPHOLOGY, INITIAL ENCOUNTER (H): ICD-10-CM

## 2024-01-04 DIAGNOSIS — S72.471D CLOSED TORUS FRACTURE OF DISTAL END OF RIGHT FEMUR WITH ROUTINE HEALING, SUBSEQUENT ENCOUNTER: Primary | ICD-10-CM

## 2024-01-04 PROCEDURE — 99310 SBSQ NF CARE HIGH MDM 45: CPT | Performed by: NURSE PRACTITIONER

## 2024-01-04 RX ORDER — AMOXICILLIN 250 MG
1 CAPSULE ORAL 2 TIMES DAILY PRN
Start: 2024-01-04 | End: 2024-05-28

## 2024-01-04 RX ORDER — LISINOPRIL 10 MG/1
10 TABLET ORAL DAILY
Start: 2024-01-04 | End: 2024-05-03

## 2024-01-04 NOTE — PLAN OF CARE
Occupational Therapy Discharge Summary    Reason for therapy discharge:    Discharged to transitional care facility.    Progress towards therapy goal(s). See goals on Care Plan in [facility] electronic health record for goal details.  Goals not met.  Barriers to achieving goals:   discharge from facility.    Therapy recommendation(s):    Continued therapy is recommended.  Rationale/Recommendations:  Pt is performing below baseline with deficits in ROM, maintiaining weight bearing restrictions, activity tolerance and balance limiting safety and (I) with ADLs. Would benefit from TCU to optimize ind with self care prior to discharge to least restrictive environment.

## 2024-01-04 NOTE — LETTER
"    1/4/2024        RE: Mercedes Nelson  [address]  [facility] [place] [address]        [facility]    PRIMARY CARE PROVIDER AND CLINIC:  Bill Castro MD, [address] [facility] [place] / [place] [address]  Chief Complaint   Patient presents with     Hospital F/U      [facility] Medical Record Number:  [id number]  Place of Service where encounter took place:  [facility]) [25]    Mercedes Nelson  is a 78 year old  (1945), admitted to the above facility from  [facility]. Hospital stay 12/27/23 through 1/3/24..   HPI:    PMH of HTN, HLD, OA with prior right TKA who presented after a fall.   Distal right femur fracture s/p ORIF 12/28/23  Mechanical fall  NWB RLE, f/u with Dr. Kimball  Anemia: Hbg 13-14 at baseline, on admission Hgb 10.6--> 8.4 post op, transfused 1 units PRBC.   Hyponatremia: mild, improved likely r/t pain  On exam today: patient sitting up in w/c, alert, pleasant, denies pain to right leg, states she has some \"burning\" sensation to knee area, has been using tylenol prn, denies fever, chills, cough, congestion, N/V/D or constipation, states she slept well last night, appetite is good    CODE STATUS/ADVANCE DIRECTIVES DISCUSSION:  Prior  CPR/Full code   ALLERGIES:   Allergies   Allergen Reactions     Dust Mites Other (See Comments)     Iodine-131 Angioedema, Hives and Itching      PAST MEDICAL HISTORY:   Past Medical History:   Diagnosis Date     Dyslipidemia      Hypertension      Osteoarthritis       PAST SURGICAL HISTORY:   has a past surgical history that includes Cataract Extraction, Bilateral (04/2023) and Open reduction internal fixation femur distal (Right, 12/28/2023).  FAMILY HISTORY: family history includes Breast Cancer (age of onset: 80) in her mother; Myocardial Infarction (age of onset: 57) in her father.  SOCIAL HISTORY:   reports that she has never smoked. She has never used smokeless tobacco. She reports that she does not drink " alcohol.  Patient's living condition: lives alone    Post Discharge Medication Reconciliation Status:   MED REC REQUIRED  Post Medication Reconciliation Status: discharge medications reconciled and changed, per note/orders       Current Outpatient Medications   Medication Sig     acetaminophen (TYLENOL) 325 MG tablet Take 2 tablets (650 mg) by mouth every 4 hours as needed for other (For optimal non-opioid multimodal pain management to improve pain control.)     apixaban ANTICOAGULANT (ELIQUIS) 2.5 MG tablet Take 1 tablet (2.5 mg) by mouth 2 times daily     [START ON 1/18/2024] aspirin (ASA) 325 MG EC tablet Take 1 tablet (325 mg) by mouth 2 times daily Resume when apixaban completed     atenolol (TENORMIN) 100 MG tablet Take 100 mg by mouth daily     B Complex Vitamins (VITAMIN  B COMPLEX) tablet Take 1 tablet by mouth daily     clobetasol (TEMOVATE) 0.05 % external ointment Apply thin layer to affected area daily     Coenzyme Q10 (COQ-10 PO) Take 1 tablet by mouth daily     hydrOXYzine HCl (ATARAX) 10 MG tablet Take 10 mg by mouth every 8 hours as needed for itching     [START ON 1/18/2024] ibuprofen (ADVIL/MOTRIN) 600 MG tablet Take 1 tablet (600 mg) by mouth every 8 hours as needed for moderate pain Can resume when apixaban completed     lisinopril (ZESTRIL) 10 MG tablet Take 1 tablet (10 mg) by mouth daily for 30 days     Multiple Vitamin (MULTI-VITAMIN) per tablet Take 1 tablet by mouth daily     prednisoLONE acetate (PRED FORTE) 1 % ophthalmic suspension Place 1 drop into both eyes 4 times daily     rosuvastatin (CRESTOR) 20 MG tablet Take 20 mg by mouth daily     senna-docusate (SENOKOT-S/PERICOLACE) 8.6-50 MG tablet Take 1 tablet by mouth 2 times daily     VITAMIN D, CHOLECALCIFEROL, PO Take 1 capsule by mouth daily     No current facility-administered medications for this visit.       ROS:  10 point ROS of systems including Constitutional, Eyes, Respiratory, Cardiovascular, Gastroenterology, Genitourinary,  "Integumentary, Musculoskeletal, Psychiatric were all negative except for pertinent positives noted in my HPI.    Vitals:  Ht 1.727 m (5' 8\")   Wt 79.4 kg (175 lb)   BMI 26.61 kg/m    Exam:  GENERAL APPEARANCE:  Alert  ENT:  Mouth and posterior oropharynx normal, moist mucous membranes, normal hearing acuity  EYES:  EOM, conjunctivae, lids, pupils and irises normal, PERRL  RESP:  respiratory effort and palpation of chest normal, lungs clear to auscultation , no respiratory distress  CV:  Palpation and auscultation of heart done , regular rate and rhythm, no murmur, rub, or gallop, peripheral edema trace+ in RLE  ABDOMEN:  normal bowel sounds, soft, nontender, no hepatosplenomegaly or other masses  M/S:   patient sitting up in w/c  SKIN:  Inspection of skin and subcutaneous tissue baseline, did not visualize surgical  incision  NEURO:   speech wnl  PSYCH:  affect and mood normal    Lab/Diagnostic data:  Recent labs in [facility] reviewed by me today.  and Most Recent 3 CBC's:  Recent Labs   Lab Test 01/03/24  0604 01/01/24  1442 01/01/24  0608 12/30/23  0715   WBC 6.5  --  6.1 6.6   HGB 8.6* 8.9* 8.7* 8.1*   MCV 97  --  94 95     --  213 170     Most Recent 3 BMP's:  Recent Labs   Lab Test 01/03/24  0604 01/02/24  0735 01/01/24  1323 01/01/24  0608 12/31/23  0721 12/30/23  0715   *  --   --  132*  --  134*   POTASSIUM 3.6 3.8 3.7 3.3*   < > 3.9  3.9   CHLORIDE 99  --   --  98  --  101   CO2 27  --   --  26  --  25   BUN 12.9  --   --  9.0  --  11.3   CR 0.59  --   --  0.50*  --  0.50*   ANIONGAP 8  --   --  8  --  8   SARAH 8.4*  --   --  8.3*  --  7.9*   *  --   --  112*  --  96    < > = values in this interval not displayed.       ASSESSMENT/PLAN:    (A68.775O) Closed torus fracture of distal end of right femur with routine healing, subsequent encounter  (primary encounter diagnosis)  (R53.81) Physical deconditioning  Comment: acute/ongoing  Plan: ALEX[facility] PADILLA, f/u with Dr. Kimball as directed  PT and OT, " tylenol 650mg q 4 hours prn  Eliquis 2.5mg BID through 1/17/24 then start ASA 325mg BID dvt prophlaxis    (D62) Anemia due to blood loss, acute  Comment: acute/ongoing  Plan: Hgb on Monday, follow    (E87.1) Hyponatremia  Comment: acute/ongoing  Plan: BMP on Monday, follow    (I10) Benign essential hypertension  Comment: acute/ongoing  Plan: BMP follow, continue atenolol 100mg QD, lisinopril 10mg QD    (K59.03) Drug-induced constipation  Comment: acute/ongoing  Plan: senna s 1 PO BID prn      Orders:  BMP and Hgb on Monday      Total time spent with patient visit at the skilled nursing facility was 45 min including patient visit and review of past records. Reviewed ortho progress notes, imaging and medication changes, discussed pain control and medications with patient at bedside    Electronically signed by:  Tonya Lynn Haase, APRN CNP                     Sincerely,        Tonya Lynn Haase, APRN CNP

## 2024-01-04 NOTE — PROGRESS NOTES
"[facility] GERIATRICS    PRIMARY CARE PROVIDER AND CLINIC:  Bill Castro MD, [address] / [place] [address]  Chief Complaint   Patient presents with    Hospital F/U      [place] Medical Record Number:  [id number]  Place of Service where encounter took place:  [facility]) [25]    Mercedes Nelson  is a 78 year old  (1945), admitted to the above facility from  [facility]. Hospital stay 12/27/23 through 1/3/24..   HPI:    PMH of HTN, HLD, OA with prior right TKA who presented after a fall.   Distal right femur fracture s/p ORIF 12/28/23  Mechanical fall  NWB RLE, f/u with Dr. Kimball  Anemia: Hbg 13-14 at baseline, on admission Hgb 10.6--> 8.4 post op, transfused 1 units PRBC.   Hyponatremia: mild, improved likely r/t pain  On exam today: patient sitting up in w/c, alert, pleasant, denies pain to right leg, states she has some \"burning\" sensation to knee area, has been using tylenol prn, denies fever, chills, cough, congestion, N/V/D or constipation, states she slept well last night, appetite is good    CODE STATUS/ADVANCE DIRECTIVES DISCUSSION:  Prior  CPR/Full code   ALLERGIES:   Allergies   Allergen Reactions    Dust Mites Other (See Comments)    Iodine-131 Angioedema, Hives and Itching      PAST MEDICAL HISTORY:   Past Medical History:   Diagnosis Date    Dyslipidemia     Hypertension     Osteoarthritis       PAST SURGICAL HISTORY:   has a past surgical history that includes Cataract Extraction, Bilateral (04/2023) and Open reduction internal fixation femur distal (Right, 12/28/2023).  FAMILY HISTORY: family history includes Breast Cancer (age of onset: 80) in her mother; Myocardial Infarction (age of onset: 57) in her father.  SOCIAL HISTORY:   reports that she has never smoked. She has never used smokeless tobacco. She reports that she does not drink alcohol.  Patient's living condition: lives alone    Post Discharge Medication Reconciliation " Status:   MED REC REQUIRED  Post Medication Reconciliation Status: discharge medications reconciled and changed, per note/orders       Current Outpatient Medications   Medication Sig    acetaminophen (TYLENOL) 325 MG tablet Take 2 tablets (650 mg) by mouth every 4 hours as needed for other (For optimal non-opioid multimodal pain management to improve pain control.)    apixaban ANTICOAGULANT (ELIQUIS) 2.5 MG tablet Take 1 tablet (2.5 mg) by mouth 2 times daily    [START ON 1/18/2024] aspirin (ASA) 325 MG EC tablet Take 1 tablet (325 mg) by mouth 2 times daily Resume when apixaban completed    atenolol (TENORMIN) 100 MG tablet Take 100 mg by mouth daily    B Complex Vitamins (VITAMIN  B COMPLEX) tablet Take 1 tablet by mouth daily    clobetasol (TEMOVATE) 0.05 % external ointment Apply thin layer to affected area daily    Coenzyme Q10 (COQ-10 PO) Take 1 tablet by mouth daily    hydrOXYzine HCl (ATARAX) 10 MG tablet Take 10 mg by mouth every 8 hours as needed for itching    [START ON 1/18/2024] ibuprofen (ADVIL/MOTRIN) 600 MG tablet Take 1 tablet (600 mg) by mouth every 8 hours as needed for moderate pain Can resume when apixaban completed    lisinopril (ZESTRIL) 10 MG tablet Take 1 tablet (10 mg) by mouth daily for 30 days    Multiple Vitamin (MULTI-VITAMIN) per tablet Take 1 tablet by mouth daily    prednisoLONE acetate (PRED FORTE) 1 % ophthalmic suspension Place 1 drop into both eyes 4 times daily    rosuvastatin (CRESTOR) 20 MG tablet Take 20 mg by mouth daily    senna-docusate (SENOKOT-S/PERICOLACE) 8.6-50 MG tablet Take 1 tablet by mouth 2 times daily    VITAMIN D, CHOLECALCIFEROL, PO Take 1 capsule by mouth daily     No current facility-administered medications for this visit.       ROS:  10 point ROS of systems including Constitutional, Eyes, Respiratory, Cardiovascular, Gastroenterology, Genitourinary, Integumentary, Musculoskeletal, Psychiatric were all negative except for pertinent positives noted in my  "HPI.    Vitals:  Ht 1.727 m (5' 8\")   Wt 79.4 kg (175 lb)   BMI 26.61 kg/m    Exam:  GENERAL APPEARANCE:  Alert  ENT:  Mouth and posterior oropharynx normal, moist mucous membranes, normal hearing acuity  EYES:  EOM, conjunctivae, lids, pupils and irises normal, PERRL  RESP:  respiratory effort and palpation of chest normal, lungs clear to auscultation , no respiratory distress  CV:  Palpation and auscultation of heart done , regular rate and rhythm, no murmur, rub, or gallop, peripheral edema trace+ in RLE  ABDOMEN:  normal bowel sounds, soft, nontender, no hepatosplenomegaly or other masses  M/S:   patient sitting up in w/c  SKIN:  Inspection of skin and subcutaneous tissue baseline, did not visualize surgical  incision  NEURO:   speech wnl  PSYCH:  affect and mood normal    Lab/Diagnostic data:  Recent labs in [facility] reviewed by me today.  and Most Recent 3 CBC's:  Recent Labs   Lab Test 01/03/24  0604 01/01/24  1442 01/01/24  0608 12/30/23  0715   WBC 6.5  --  6.1 6.6   HGB 8.6* 8.9* 8.7* 8.1*   MCV 97  --  94 95     --  213 170     Most Recent 3 BMP's:  Recent Labs   Lab Test 01/03/24  0604 01/02/24  0735 01/01/24  1323 01/01/24  0608 12/31/23  0721 12/30/23  0715   *  --   --  132*  --  134*   POTASSIUM 3.6 3.8 3.7 3.3*   < > 3.9  3.9   CHLORIDE 99  --   --  98  --  101   CO2 27  --   --  26  --  25   BUN 12.9  --   --  9.0  --  11.3   CR 0.59  --   --  0.50*  --  0.50*   ANIONGAP 8  --   --  8  --  8   SARAH 8.4*  --   --  8.3*  --  7.9*   *  --   --  112*  --  96    < > = values in this interval not displayed.       ASSESSMENT/PLAN:    (P38.143O) Closed torus fracture of distal end of right femur with routine healing, subsequent encounter  (primary encounter diagnosis)  (R53.81) Physical deconditioning  Comment: acute/ongoing  Plan: NWB RLIAN, f/u with Dr. Kimball as directed  PT and OT, tylenol 650mg q 4 hours prn  Eliquis 2.5mg BID through 1/17/24 then start ASA 325mg BID dvt " prophlaxis    (D62) Anemia due to blood loss, acute  Comment: acute/ongoing  Plan: Hgb on Monday, follow    (E87.1) Hyponatremia  Comment: acute/ongoing  Plan: BMP on Monday, follow    (I10) Benign essential hypertension  Comment: acute/ongoing  Plan: BMP follow, continue atenolol 100mg QD, lisinopril 10mg QD    (K59.03) Drug-induced constipation  Comment: acute/ongoing  Plan: senna s 1 PO BID prn      Orders:  BMP and Hgb on Monday      Total time spent with patient visit at the skilled nursing facility was 45 min including patient visit and review of past records. Reviewed ortho progress notes, imaging and medication changes, discussed pain control and medications with patient at bedside    Electronically signed by:  Tonya Lynn Haase, APRN CNP

## 2024-01-05 ENCOUNTER — DOCUMENTATION ONLY (OUTPATIENT)
Dept: GERIATRICS | Facility: CLINIC | Age: 79
End: 2024-01-05
Payer: COMMERCIAL

## 2024-01-08 ENCOUNTER — TELEPHONE (OUTPATIENT)
Dept: GERIATRICS | Facility: CLINIC | Age: 79
End: 2024-01-08

## 2024-01-08 ENCOUNTER — TRANSITIONAL CARE UNIT VISIT (OUTPATIENT)
Dept: GERIATRICS | Facility: CLINIC | Age: 79
End: 2024-01-08
Payer: COMMERCIAL

## 2024-01-08 VITALS
BODY MASS INDEX: 28.79 KG/M2 | HEART RATE: 88 BPM | DIASTOLIC BLOOD PRESSURE: 65 MMHG | WEIGHT: 190 LBS | TEMPERATURE: 99.4 F | RESPIRATION RATE: 17 BRPM | HEIGHT: 68 IN | OXYGEN SATURATION: 96 % | SYSTOLIC BLOOD PRESSURE: 137 MMHG

## 2024-01-08 DIAGNOSIS — R53.81 PHYSICAL DECONDITIONING: ICD-10-CM

## 2024-01-08 DIAGNOSIS — K59.03 DRUG-INDUCED CONSTIPATION: ICD-10-CM

## 2024-01-08 DIAGNOSIS — I10 BENIGN ESSENTIAL HYPERTENSION: ICD-10-CM

## 2024-01-08 DIAGNOSIS — S72.471D CLOSED TORUS FRACTURE OF DISTAL END OF RIGHT FEMUR WITH ROUTINE HEALING, SUBSEQUENT ENCOUNTER: Primary | ICD-10-CM

## 2024-01-08 DIAGNOSIS — E87.1 HYPONATREMIA: ICD-10-CM

## 2024-01-08 DIAGNOSIS — D62 ANEMIA DUE TO BLOOD LOSS, ACUTE: ICD-10-CM

## 2024-01-08 PROCEDURE — 99310 SBSQ NF CARE HIGH MDM 45: CPT | Performed by: NURSE PRACTITIONER

## 2024-01-08 NOTE — LETTER
"    1/8/2024        RE: Mercedes Nelson  [address] [address] [facility] [address]        [facility] GERIATRICS    Chief Complaint   Patient presents with     RECHECK     HPI:  Mercedes Nelson is a 78 year old  (1945), who is being seen today for an episodic care visit at: [facility]) [25]. Today's concern is:   Right femur Fx: on exam today patient is sitting up in w/c, states pain in right leg is \"achy\" no acute pain noted. In therapy patient is working on stand pivot transfers and walking up to 15 feet using a RW with CGA able to maintain NWB  Anemia: see labs  Hyponatremia: see labs  HTN: /65, 115/76, 162/89 with HR 80 range, denies CP, palpitations, SOB  Constipation: patient states bowels are working    Allergies, and PMH/PSH reviewed in [facility] today.  REVIEW OF SYSTEMS:  10 point ROS of systems including Constitutional, Eyes, Respiratory, Cardiovascular, Gastroenterology, Genitourinary, Integumentary, Musculoskeletal, Psychiatric were all negative except for pertinent positives noted in my HPI.    Objective:   /65   Pulse 88   Temp 99.4  F (37.4  C)   Resp 17   Ht 1.727 m (5' 8\")   Wt 86.2 kg (190 lb)   SpO2 96%   BMI 28.89 kg/m    GENERAL APPEARANCE:  Alert, in no distress  ENT:  Mouth and posterior oropharynx normal, moist mucous membranes, normal hearing acuity  EYES:  EOM, conjunctivae, lids, pupils and irises normal, PERRL  RESP:  respiratory effort and palpation of chest normal, lungs clear to auscultation , no respiratory distress  CV:  Palpation and auscultation of heart done , regular rate and rhythm, no murmur, rub, or gallop, peripheral edema 1+ in RLE  ABDOMEN:  normal bowel sounds, soft, nontender, no hepatosplenomegaly or other masses  M/S:   patient sitting up in w/c  SKIN:  Inspection of skin and subcutaneous tissue baseline, did not visualize surgical incision  NEURO:   Cranial nerves 2-12 are normal tested and grossly at patient's baseline, speech " wnl  PSYCH:  affect and mood normal    Recent labs in [facility] reviewed by me today.  and Most Recent 3 CBC's:  Recent Labs   Lab Test 01/03/24  0604 01/01/24  1442 01/01/24  0608 12/30/23  0715   WBC 6.5  --  6.1 6.6   HGB 8.6* 8.9* 8.7* 8.1*   MCV 97  --  94 95     --  213 170     Most Recent 3 BMP's:  Recent Labs   Lab Test 01/03/24  0604 01/02/24  0735 01/01/24  1323 01/01/24  0608 12/31/23  0721 12/30/23  0715   *  --   --  132*  --  134*   POTASSIUM 3.6 3.8 3.7 3.3*   < > 3.9  3.9   CHLORIDE 99  --   --  98  --  101   CO2 27  --   --  26  --  25   BUN 12.9  --   --  9.0  --  11.3   CR 0.59  --   --  0.50*  --  0.50*   ANIONGAP 8  --   --  8  --  8   SARAH 8.4*  --   --  8.3*  --  7.9*   *  --   --  112*  --  96    < > = values in this interval not displayed.       Assessment/Plan:  (S27.279S) Closed torus fracture of distal end of right femur with routine healing, subsequent encounter  (primary encounter diagnosis)  (R53.81) Physical deconditioning  Comment: acute/ongoing, no change  Plan: NWB RLE, f/u with Dr. Kimball as directed  PT and OT, tylenol 650mg q 4 hours prn  Eliquis 2.5mg BID through 1/17/24 then start ASA 325mg BID dvt prophlaxis     (D62) Anemia due to blood loss, acute  Comment: acute/ongoing, no change  Plan: Hgb pending for today, follow     (E87.1) Hyponatremia  Comment: acute/ongoing, no change  Plan: BMP pending for today, follow     (I10) Benign essential hypertension  Comment: acute/ongoing, no change  Plan: BMP follow, continue atenolol 100mg QD, lisinopril 10mg QD     (K59.03) Drug-induced constipation  Comment: acute/ongoing, no change  Plan: senna s 1 PO BID prn    MED REC REQUIRED  Post Medication Reconciliation Status: medication reconcilation previously completed during another office visit      Orders:  No new orders      Electronically signed by: Tonya Lynn Haase, APRN CNP         Sincerely,        Tonya Lynn Haase, APRN CNP

## 2024-01-08 NOTE — PROGRESS NOTES
"[facility] GERIATRICS    Chief Complaint   Patient presents with    RECHECK     HPI:  Mercedes Nelson is a 78 year old  (1945), who is being seen today for an episodic care visit at: [facility]) [25]. Today's concern is:   Right femur Fx: on exam today patient is sitting up in w/c, states pain in right leg is \"achy\" no acute pain noted. In therapy patient is working on stand pivot transfers and walking up to 15 feet using a RW with CGA able to maintain NWB  Anemia: see labs  Hyponatremia: see labs  HTN: /65, 115/76, 162/89 with HR 80 range, denies CP, palpitations, SOB  Constipation: patient states bowels are working    Allergies, and PMH/PSH reviewed in [facility] today.  REVIEW OF SYSTEMS:  10 point ROS of systems including Constitutional, Eyes, Respiratory, Cardiovascular, Gastroenterology, Genitourinary, Integumentary, Musculoskeletal, Psychiatric were all negative except for pertinent positives noted in my HPI.    Objective:   /65   Pulse 88   Temp 99.4  F (37.4  C)   Resp 17   Ht 1.727 m (5' 8\")   Wt 86.2 kg (190 lb)   SpO2 96%   BMI 28.89 kg/m    GENERAL APPEARANCE:  Alert, in no distress  ENT:  Mouth and posterior oropharynx normal, moist mucous membranes, normal hearing acuity  EYES:  EOM, conjunctivae, lids, pupils and irises normal, PERRL  RESP:  respiratory effort and palpation of chest normal, lungs clear to auscultation , no respiratory distress  CV:  Palpation and auscultation of heart done , regular rate and rhythm, no murmur, rub, or gallop, peripheral edema 1+ in RLE  ABDOMEN:  normal bowel sounds, soft, nontender, no hepatosplenomegaly or other masses  M/S:   patient sitting up in w/c  SKIN:  Inspection of skin and subcutaneous tissue baseline, did not visualize surgical incision  NEURO:   Cranial nerves 2-12 are normal tested and grossly at patient's baseline, speech wnl  PSYCH:  affect and mood normal    Recent labs in [facility] reviewed by me today.  and Most " Recent 3 CBC's:  Recent Labs   Lab Test 01/03/24  0604 01/01/24  1442 01/01/24  0608 12/30/23  0715   WBC 6.5  --  6.1 6.6   HGB 8.6* 8.9* 8.7* 8.1*   MCV 97  --  94 95     --  213 170     Most Recent 3 BMP's:  Recent Labs   Lab Test 01/03/24  0604 01/02/24  0735 01/01/24  1323 01/01/24  0608 12/31/23  0721 12/30/23  0715   *  --   --  132*  --  134*   POTASSIUM 3.6 3.8 3.7 3.3*   < > 3.9  3.9   CHLORIDE 99  --   --  98  --  101   CO2 27  --   --  26  --  25   BUN 12.9  --   --  9.0  --  11.3   CR 0.59  --   --  0.50*  --  0.50*   ANIONGAP 8  --   --  8  --  8   SARAH 8.4*  --   --  8.3*  --  7.9*   *  --   --  112*  --  96    < > = values in this interval not displayed.       Assessment/Plan:  (E86.018J) Closed torus fracture of distal end of right femur with routine healing, subsequent encounter  (primary encounter diagnosis)  (R53.81) Physical deconditioning  Comment: acute/ongoing, no change  Plan: NWB RLE, f/u with Dr. Kimball as directed  PT and OT, tylenol 650mg q 4 hours prn  Eliquis 2.5mg BID through 1/17/24 then start ASA 325mg BID dvt prophlaxis     (D62) Anemia due to blood loss, acute  Comment: acute/ongoing, no change  Plan: Hgb pending for today, follow     (E87.1) Hyponatremia  Comment: acute/ongoing, no change  Plan: BMP pending for today, follow     (I10) Benign essential hypertension  Comment: acute/ongoing, no change  Plan: BMP follow, continue atenolol 100mg QD, lisinopril 10mg QD     (K59.03) Drug-induced constipation  Comment: acute/ongoing, no change  Plan: senna s 1 PO BID prn    MED REC REQUIRED  Post Medication Reconciliation Status: medication reconcilation previously completed during another office visit      Orders:  No new orders      Electronically signed by: Tonya Lynn Haase, APRN CNP

## 2024-01-08 NOTE — TELEPHONE ENCOUNTER
[facility]     Provider: Tonya Haase, APRN CNP  Facility: [facility] Facility Type:  TCU    Allergies   Allergen Reactions    Dust Mites Other (See Comments)    Iodine-131 Angioedema, Hives and Itching       Labs Reviewed by provider: BMP, Hgb     Verbal Order/Direction given by Provider: Potassium 20meq daily.  Check BMP on 1/11/24.      Provider giving Order:  Tonya Haase, APRN CNP    Verbal Order given to: Stephanie([phone])    Kvng Tatum RN

## 2024-01-10 ENCOUNTER — TRANSITIONAL CARE UNIT VISIT (OUTPATIENT)
Dept: GERIATRICS | Facility: CLINIC | Age: 79
End: 2024-01-10
Payer: COMMERCIAL

## 2024-01-10 VITALS
HEIGHT: 68 IN | SYSTOLIC BLOOD PRESSURE: 150 MMHG | DIASTOLIC BLOOD PRESSURE: 80 MMHG | HEART RATE: 70 BPM | RESPIRATION RATE: 18 BRPM | WEIGHT: 183.9 LBS | TEMPERATURE: 99.2 F | OXYGEN SATURATION: 95 % | BODY MASS INDEX: 27.87 KG/M2

## 2024-01-10 DIAGNOSIS — D62 ANEMIA DUE TO BLOOD LOSS, ACUTE: ICD-10-CM

## 2024-01-10 DIAGNOSIS — S72.471D CLOSED TORUS FRACTURE OF DISTAL END OF RIGHT FEMUR WITH ROUTINE HEALING, SUBSEQUENT ENCOUNTER: Primary | ICD-10-CM

## 2024-01-10 DIAGNOSIS — I10 BENIGN ESSENTIAL HYPERTENSION: ICD-10-CM

## 2024-01-10 DIAGNOSIS — E87.1 HYPONATREMIA: ICD-10-CM

## 2024-01-10 PROCEDURE — 99304 1ST NF CARE SF/LOW MDM 25: CPT | Performed by: INTERNAL MEDICINE

## 2024-01-10 NOTE — LETTER
1/10/2024        RE: Mercedes Nelson  [address]  [facility] [address]        [facility] GERIATRICS    PRIMARY CARE PROVIDER AND CLINIC:  Bill Castro MD, [address] [facility] [address] / [place] [address]  Chief Complaint   Patient presents with     Hospital F/U      [place] Medical Record Number:  [id number]  Place of Service where encounter took place:  [facility] ([facility])     Mercedes Nelson  is a 78 year old  (1945), admitted to the above facility from  [facility]. Hospital stay 12/27/23 through 1/3/24..     Hospital course was reviewed by me, is as per the hospital discharge summary and nurse practitioner note    Patient has a past medical history of hypertension, prior right TKA  She suffered a distal right femur fracture following a mechanical fall, necessitating an ORIF on 12/20/2023.  Postop course complicated by acute blood loss anemia for which she received 1 unit packed red blood cells.  She also had mild hyponatremia.  She is nonweightbearing right lower extremity.    Patient reports feeling well overall.  States pain is under fair control.  She is progressing well in therapy.  She does note nocturia without dysuria or incontinence.  She is having regular bowel movements.  Appetite has been good      CODE STATUS/ADVANCE DIRECTIVES DISCUSSION:  Prior  CPR/Full code   ALLERGIES:   Allergies   Allergen Reactions     Dust Mites Other (See Comments)     Iodine-131 Angioedema, Hives and Itching      PAST MEDICAL HISTORY:   Past Medical History:   Diagnosis Date     Dyslipidemia      Hypertension      Osteoarthritis       PAST SURGICAL HISTORY:   has a past surgical history that includes Cataract Extraction, Bilateral (04/2023) and Open reduction internal fixation femur distal (Right, 12/28/2023).  FAMILY HISTORY: family history includes Breast Cancer (age of onset: 80) in her mother; Myocardial Infarction (age of onset: 57) in her father.  SOCIAL HISTORY:    "reports that she has never smoked. She has never used smokeless tobacco. She reports that she does not drink alcohol.  Patient's living condition: lives with spouse    Current medications were reviewed by me today    Current Outpatient Medications   Medication Sig     acetaminophen (TYLENOL) 325 MG tablet Take 2 tablets (650 mg) by mouth every 4 hours as needed for other (For optimal non-opioid multimodal pain management to improve pain control.)     apixaban ANTICOAGULANT (ELIQUIS) 2.5 MG tablet Take 1 tablet (2.5 mg) by mouth 2 times daily     [START ON 1/18/2024] aspirin (ASA) 325 MG EC tablet Take 1 tablet (325 mg) by mouth 2 times daily Resume when apixaban completed     atenolol (TENORMIN) 100 MG tablet Take 100 mg by mouth daily     B Complex Vitamins (VITAMIN  B COMPLEX) tablet Take 1 tablet by mouth daily     clobetasol (TEMOVATE) 0.05 % external ointment Apply thin layer to affected area daily     Coenzyme Q10 (COQ-10 PO) Take 1 tablet by mouth daily     lisinopril (ZESTRIL) 10 MG tablet Take 1 tablet (10 mg) by mouth daily     Multiple Vitamin (MULTI-VITAMIN) per tablet Take 1 tablet by mouth daily     prednisoLONE acetate (PRED FORTE) 1 % ophthalmic suspension Place 1 drop into both eyes 4 times daily     rosuvastatin (CRESTOR) 20 MG tablet Take 20 mg by mouth daily     senna-docusate (SENOKOT-S/PERICOLACE) 8.6-50 MG tablet Take 1 tablet by mouth 2 times daily as needed for constipation     VITAMIN D, CHOLECALCIFEROL, PO Take 1 capsule by mouth daily     No current facility-administered medications for this visit.       ROS:  10 point ROS of systems including Constitutional, Eyes, Respiratory, Cardiovascular, Gastroenterology, Genitourinary, Integumentary, Musculoskeletal, Psychiatric were all negative except for pertinent positives noted in my HPI.    Vitals:  BP (!) 150/80   Pulse 70   Temp 99.2  F (37.3  C)   Resp 18   Ht 1.727 m (5' 8\")   Wt 83.4 kg (183 lb 14.4 oz)   SpO2 95%   BMI 27.96 " kg/m    Exam:  Very pleasant, well-appearing female, seen while participating physical therapy  She appears comfortable  HEENT: Oral mucosa moist  Lungs clear  CV RRR  Abdomen not distended  No right calf edema    Lab/Diagnostic data:  Most Recent 3 CBC's:  Recent Labs   Lab Test 01/03/24  0604 01/01/24  1442 01/01/24  0608 12/30/23  0715   WBC 6.5  --  6.1 6.6   HGB 8.6* 8.9* 8.7* 8.1*   MCV 97  --  94 95     --  213 170     Most Recent 3 BMP's:  Recent Labs   Lab Test 01/03/24  0604 01/02/24  0735 01/01/24  1323 01/01/24  0608 12/31/23  0721 12/30/23  0715   *  --   --  132*  --  134*   POTASSIUM 3.6 3.8 3.7 3.3*   < > 3.9  3.9   CHLORIDE 99  --   --  98  --  101   CO2 27  --   --  26  --  25   BUN 12.9  --   --  9.0  --  11.3   CR 0.59  --   --  0.50*  --  0.50*   ANIONGAP 8  --   --  8  --  8   SARAH 8.4*  --   --  8.3*  --  7.9*   *  --   --  112*  --  96    < > = values in this interval not displayed.       ASSESSMENT/PLAN:    Closed fracture distal right femur, status post ORIF  Patient doing well  Pain controlled  Plan: Continue nonweightbearing status.  Therapies.  Short term apixaban.  Orthopedics follow-up.    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  Patient does not feel urine culture necessary at this time.      Gavin Crockett MD      Sincerely,        Gavin Crockett MD

## 2024-01-11 ENCOUNTER — TRANSITIONAL CARE UNIT VISIT (OUTPATIENT)
Dept: GERIATRICS | Facility: CLINIC | Age: 79
End: 2024-01-11
Payer: COMMERCIAL

## 2024-01-11 VITALS
TEMPERATURE: 97.9 F | WEIGHT: 183 LBS | SYSTOLIC BLOOD PRESSURE: 114 MMHG | DIASTOLIC BLOOD PRESSURE: 69 MMHG | BODY MASS INDEX: 27.74 KG/M2 | OXYGEN SATURATION: 93 % | RESPIRATION RATE: 19 BRPM | HEART RATE: 75 BPM | HEIGHT: 68 IN

## 2024-01-11 DIAGNOSIS — S72.471D CLOSED TORUS FRACTURE OF DISTAL END OF RIGHT FEMUR WITH ROUTINE HEALING, SUBSEQUENT ENCOUNTER: Primary | ICD-10-CM

## 2024-01-11 DIAGNOSIS — E87.6 HYPOKALEMIA: ICD-10-CM

## 2024-01-11 DIAGNOSIS — K59.03 DRUG-INDUCED CONSTIPATION: ICD-10-CM

## 2024-01-11 DIAGNOSIS — D62 ANEMIA DUE TO BLOOD LOSS, ACUTE: ICD-10-CM

## 2024-01-11 DIAGNOSIS — I10 BENIGN ESSENTIAL HYPERTENSION: ICD-10-CM

## 2024-01-11 DIAGNOSIS — R53.81 PHYSICAL DECONDITIONING: ICD-10-CM

## 2024-01-11 DIAGNOSIS — E87.1 HYPONATREMIA: ICD-10-CM

## 2024-01-11 PROCEDURE — 99310 SBSQ NF CARE HIGH MDM 45: CPT | Performed by: NURSE PRACTITIONER

## 2024-01-11 RX ORDER — POTASSIUM CHLORIDE 750 MG/1
20 TABLET, EXTENDED RELEASE ORAL DAILY
Start: 2024-01-11 | End: 2024-05-03

## 2024-01-11 NOTE — LETTER
"    1/11/2024        RE: Mercedes Nelson  [address] [facility] [address]        [facility] GERIATRICS    Chief Complaint   Patient presents with     RECHECK     HPI:  Mercedes Nelson is a 78 year old  (1945), who is being seen today for an episodic care visit at: [facility]) [25]. Today's concern is:   Right femur Fx: on exam today patient is resting in bed,  no c/o pain in right leg. In therapy patient is working on stand pivot transfers and walking up to 40 feet using a RW with CGA, stand pivot transfers are independent  Anemia: Hgb 10.4 o 1/8/24  Hyponatremia/hypokalemia: Na 137 1/8/24 with K+ 3.0  HTN: /69, 150/80, 137/65 with HR 70 range, denies CP, palpitations, SOB  Constipation: patient states bowels are working       Allergies, and PMH/PSH reviewed in [facility] today.  REVIEW OF SYSTEMS:  10 point ROS of systems including Constitutional, Eyes, Respiratory, Cardiovascular, Gastroenterology, Genitourinary, Integumentary, Musculoskeletal, Psychiatric were all negative except for pertinent positives noted in my HPI.    Objective:   /69   Pulse 75   Temp 97.9  F (36.6  C)   Resp 19   Ht 1.727 m (5' 8\")   Wt 83 kg (183 lb)   SpO2 93%   BMI 27.83 kg/m    GENERAL APPEARANCE:  Alert, in no distress  ENT:  Mouth and posterior oropharynx normal, moist mucous membranes, normal hearing acuity  EYES:  EOM, conjunctivae, lids, pupils and irises normal, PERRL  RESP:  respiratory effort and palpation of chest normal, lungs clear to auscultation , no respiratory distress  CV:  Palpation and auscultation of heart done , regular rate and rhythm, no murmur, rub, or gallop, peripheral edema 1+ in RLE  ABDOMEN:  normal bowel sounds, soft, nontender, no hepatosplenomegaly or other masses  M/S:   patient resting in bed, straight leg brace RLE  SKIN:  Inspection of skin and subcutaneous tissue baseline, did not visualize surgical incisiojn  NEURO:   speech wnl  PSYCH:  affect and mood " normal    Recent labs in [facility] reviewed by me today.  and Most Recent 3 CBC's:  Recent Labs   Lab Test 01/03/24  0604 01/01/24  1442 01/01/24  0608 12/30/23  0715   WBC 6.5  --  6.1 6.6   HGB 8.6* 8.9* 8.7* 8.1*   MCV 97  --  94 95     --  213 170     Most Recent 3 BMP's:  Recent Labs   Lab Test 01/03/24  0604 01/02/24  0735 01/01/24  1323 01/01/24  0608 12/31/23  0721 12/30/23  0715   *  --   --  132*  --  134*   POTASSIUM 3.6 3.8 3.7 3.3*   < > 3.9  3.9   CHLORIDE 99  --   --  98  --  101   CO2 27  --   --  26  --  25   BUN 12.9  --   --  9.0  --  11.3   CR 0.59  --   --  0.50*  --  0.50*   ANIONGAP 8  --   --  8  --  8   SARAH 8.4*  --   --  8.3*  --  7.9*   *  --   --  112*  --  96    < > = values in this interval not displayed.       Assessment/Plan:  (Z50.979Z) Closed torus fracture of distal end of right femur with routine healing, subsequent encounter  (primary encounter diagnosis)  (R53.81) Physical deconditioning  Comment: acute/ongoing, no change  Plan: NWB RLE, f/u with Dr. Kimball as directed  PT and OT, tylenol 650mg q 4 hours prn  Eliquis 2.5mg BID through 1/17/24 then start ASA 325mg BID dvt prophlaxis     (D62) Anemia due to blood loss, acute  Comment: acute/ongoing, no change  Hgb 10.4 1/8/24  Plan: Hgb follow     (E87.1) Hyponatremia  Comment: stable  Na 137 1/8/24  Plan: BMP follow    (E87.6) hypokalmia  Acute/ogoing  K+ 3.0 o 1/8/24  Plan: started on KCL 20meq QD  BMP pending today     (I10) Benign essential hypertension  Comment: acute/ongoing, no change  Plan: BMP follow, continue atenolol 100mg QD, lisinopril 10mg QD     (K59.03) Drug-induced constipation  Comment: acute/ongoing, home  Plan: senna s 1 PO BID prn       MED REC REQUIRED\F2  Post Medication Reconciliation Status: medication reconcilation previously completed during another office visit      Orders:  BMP pending for today      Electronically signed by: Tonya Lynn Haase, APRN CNP         Sincerely,        Mita Torres  Haase, APRN CNP

## 2024-01-11 NOTE — PROGRESS NOTES
"[facility] GERIATRICS    Chief Complaint   Patient presents with    RECHECK     HPI:  Mercedes Nelson is a 78 year old  (1945), who is being seen today for an episodic care visit at: [facility]) [25]. Today's concern is:   Right femur Fx: on exam today patient is resting in bed,  no c/o pain in right leg. In therapy patient is working on stand pivot transfers and walking up to 40 feet using a RW with CGA, stand pivot transfers are independent  Anemia: Hgb 10.4 o 1/8/24  Hyponatremia/hypokalemia: Na 137 1/8/24 with K+ 3.0  HTN: /69, 150/80, 137/65 with HR 70 range, denies CP, palpitations, SOB  Constipation: patient states bowels are working       Allergies, and PMH/PSH reviewed in EPIC today.  REVIEW OF SYSTEMS:  10 point ROS of systems including Constitutional, Eyes, Respiratory, Cardiovascular, Gastroenterology, Genitourinary, Integumentary, Musculoskeletal, Psychiatric were all negative except for pertinent positives noted in my HPI.    Objective:   /69   Pulse 75   Temp 97.9  F (36.6  C)   Resp 19   Ht 1.727 m (5' 8\")   Wt 83 kg (183 lb)   SpO2 93%   BMI 27.83 kg/m    GENERAL APPEARANCE:  Alert, in no distress  ENT:  Mouth and posterior oropharynx normal, moist mucous membranes, normal hearing acuity  EYES:  EOM, conjunctivae, lids, pupils and irises normal, PERRL  RESP:  respiratory effort and palpation of chest normal, lungs clear to auscultation , no respiratory distress  CV:  Palpation and auscultation of heart done , regular rate and rhythm, no murmur, rub, or gallop, peripheral edema 1+ in RLE  ABDOMEN:  normal bowel sounds, soft, nontender, no hepatosplenomegaly or other masses  M/S:   patient resting in bed, straight leg brace RLE  SKIN:  Inspection of skin and subcutaneous tissue baseline, did not visualize surgical incisiojn  NEURO:   speech wnl  PSYCH:  affect and mood normal    Recent labs in [facility] reviewed by me today.  and Most Recent 3 CBC's:  Recent " Labs   Lab Test 01/03/24  0604 01/01/24  1442 01/01/24  0608 12/30/23  0715   WBC 6.5  --  6.1 6.6   HGB 8.6* 8.9* 8.7* 8.1*   MCV 97  --  94 95     --  213 170     Most Recent 3 BMP's:  Recent Labs   Lab Test 01/03/24  0604 01/02/24  0735 01/01/24  1323 01/01/24  0608 12/31/23  0721 12/30/23  0715   *  --   --  132*  --  134*   POTASSIUM 3.6 3.8 3.7 3.3*   < > 3.9  3.9   CHLORIDE 99  --   --  98  --  101   CO2 27  --   --  26  --  25   BUN 12.9  --   --  9.0  --  11.3   CR 0.59  --   --  0.50*  --  0.50*   ANIONGAP 8  --   --  8  --  8   SARAH 8.4*  --   --  8.3*  --  7.9*   *  --   --  112*  --  96    < > = values in this interval not displayed.       Assessment/Plan:  (Y90.117I) Closed torus fracture of distal end of right femur with routine healing, subsequent encounter  (primary encounter diagnosis)  (R53.81) Physical deconditioning  Comment: acute/ongoing, no change  Plan: NWB RLE, f/u with Dr. Kimball as directed  PT and OT, tylenol 650mg q 4 hours prn  Eliquis 2.5mg BID through 1/17/24 then start ASA 325mg BID dvt prophlaxis     (D62) Anemia due to blood loss, acute  Comment: acute/ongoing, no change  Hgb 10.4 1/8/24  Plan: Hgb follow     (E87.1) Hyponatremia  Comment: stable  Na 137 1/8/24  Plan: BMP follow    (E87.6) hypokalmia  Acute/ogoing  K+ 3.0 o 1/8/24  Plan: started on KCL 20meq QD  BMP pending today     (I10) Benign essential hypertension  Comment: acute/ongoing, no change  Plan: BMP follow, continue atenolol 100mg QD, lisinopril 10mg QD     (K59.03) Drug-induced constipation  Comment: acute/ongoing, home  Plan: senna s 1 PO BID prn       MED REC REQUIRED\F2  Post Medication Reconciliation Status: medication reconcilation previously completed during another office visit      Orders:  BMP pending for today      Electronically signed by: Tonya Lynn Haase, APRN CNP

## 2024-01-14 ENCOUNTER — TELEPHONE (OUTPATIENT)
Dept: GERIATRICS | Facility: CLINIC | Age: 79
End: 2024-01-14
Payer: COMMERCIAL

## 2024-01-14 NOTE — TELEPHONE ENCOUNTER
[facility] TELEPHONE ENCOUNTER    Chief Complaint   Patient presents with    Suspected Covid       Mercedes Nelson is a 78 year old  (1945),Nurse called today to report: Found to be covid positive on 1/12/24 with rapid testing. Treatment was started. Registered Nurse called today to report lab specimen for covid also came back positive.     ASSESSMENT/PLAN    Continue Molnupiravir BID x 5 days as directed which was started on 1/12/24    Electronically signed by:   LYNDSAY Humphrey CNP

## 2024-01-15 ENCOUNTER — TELEPHONE (OUTPATIENT)
Dept: GERIATRICS | Facility: CLINIC | Age: 79
End: 2024-01-15
Payer: COMMERCIAL

## 2024-01-15 ENCOUNTER — TRANSFERRED RECORDS (OUTPATIENT)
Dept: HEALTH INFORMATION MANAGEMENT | Facility: CLINIC | Age: 79
End: 2024-01-15
Payer: COMMERCIAL

## 2024-01-15 NOTE — TELEPHONE ENCOUNTER
[facility] Triage Nurse Telephone Encounter    Provider: Tonya Haase, APRN CNP  Facility: [facility] Facility Type:  TCU    Caller: Gabi  Call Back Number: [phone]    Allergies:    Allergies   Allergen Reactions    Dust Mites Other (See Comments)    Iodine-131 Angioedema, Hives and Itching        Reason for call: Nurse is calling to report follow up x-ray results.  See results below:            Verbal Order/Direction given by Provider: Update the ortho surgeon.      Provider giving Order:  Tonya Haase, APRN CNP    Verbal Order given to: Gabi Tatum RN

## 2024-01-16 ENCOUNTER — TRANSITIONAL CARE UNIT VISIT (OUTPATIENT)
Dept: GERIATRICS | Facility: CLINIC | Age: 79
End: 2024-01-16
Payer: COMMERCIAL

## 2024-01-16 VITALS
SYSTOLIC BLOOD PRESSURE: 125 MMHG | WEIGHT: 183 LBS | HEART RATE: 74 BPM | DIASTOLIC BLOOD PRESSURE: 75 MMHG | OXYGEN SATURATION: 92 % | RESPIRATION RATE: 16 BRPM | HEIGHT: 68 IN | TEMPERATURE: 97.6 F | BODY MASS INDEX: 27.74 KG/M2

## 2024-01-16 DIAGNOSIS — D62 ANEMIA DUE TO BLOOD LOSS, ACUTE: ICD-10-CM

## 2024-01-16 DIAGNOSIS — I10 BENIGN ESSENTIAL HYPERTENSION: ICD-10-CM

## 2024-01-16 DIAGNOSIS — E87.6 HYPOKALEMIA: ICD-10-CM

## 2024-01-16 DIAGNOSIS — E87.1 HYPONATREMIA: ICD-10-CM

## 2024-01-16 DIAGNOSIS — S72.471D CLOSED TORUS FRACTURE OF DISTAL END OF RIGHT FEMUR WITH ROUTINE HEALING, SUBSEQUENT ENCOUNTER: Primary | ICD-10-CM

## 2024-01-16 DIAGNOSIS — R53.81 PHYSICAL DECONDITIONING: ICD-10-CM

## 2024-01-16 DIAGNOSIS — K59.03 DRUG-INDUCED CONSTIPATION: ICD-10-CM

## 2024-01-16 PROCEDURE — 99310 SBSQ NF CARE HIGH MDM 45: CPT | Performed by: NURSE PRACTITIONER

## 2024-01-16 NOTE — LETTER
"    1/16/2024        RE: Mercedes Nelson  [address] [facility] [address]        [facility] GERIATRICS    Chief Complaint   Patient presents with     RECHECK     HPI:  Mercedes Nelson is a 78 year old  (1945), who is being seen today for an episodic care visit at: [facility] ([facility]) [25]. Today's concern is:   Right femur Fx: on exam today patient is resting in bed,  no c/o pain in right let at rest,  In therapy patient is working on stand pivot transfers and walking 10-40 feet using a RW with CGA, stand pivot transfers are independent plan is to discharge to home on 1/20/24 will order a w/c for home use  Anemia: Hgb 10.4 o 1/8/24  Hyponatremia/hypokalemia: Na 137 1/8/24 with K+ 3.0 repeat 1/11/24 Na 135 and K+ 3.4  HTN: /76, 129/87, 126/70  with HR 70 range, denies CP, palpitations, SOB  Constipation: patient states bowels are working       Allergies, and PMH/PSH reviewed in [facility] today.  REVIEW OF SYSTEMS:  10 point ROS of systems including Constitutional, Eyes, Respiratory, Cardiovascular, Gastroenterology, Genitourinary, Integumentary, Musculoskeletal, Psychiatric were all negative except for pertinent positives noted in my HPI.    Objective:   /75   Pulse 74   Temp 97.6  F (36.4  C)   Resp 16   Ht 1.727 m (5' 8\")   Wt 83 kg (183 lb)   SpO2 92%   BMI 27.83 kg/m    GENERAL APPEARANCE:  Alert, in no distress  ENT:  Mouth and posterior oropharynx normal, moist mucous membranes, normal hearing acuity  EYES:  EOM, conjunctivae, lids, pupils and irises normal, PERRL  RESP:  respiratory effort and palpation of chest normal, lungs clear to auscultation , no respiratory distress  CV:  Palpation and auscultation of heart done , regular rate and rhythm, no murmur, rub, or gallop, peripheral edema 1+ in LE bilaterally  ABDOMEN:  normal bowel sounds, soft, nontender, no hepatosplenomegaly or other masses  M/S:   patient resting in bed  SKIN:  Inspection of skin and subcutaneous " tissue baseline  NEURO:   speech wnl  PSYCH:  affect and mood normal    Recent labs in [facility] reviewed by me today.  and Most Recent 3 CBC's:  Recent Labs   Lab Test 01/03/24  0604 01/01/24  1442 01/01/24  0608 12/30/23  0715   WBC 6.5  --  6.1 6.6   HGB 8.6* 8.9* 8.7* 8.1*   MCV 97  --  94 95     --  213 170     Most Recent 3 BMP's:  Recent Labs   Lab Test 01/03/24  0604 01/02/24  0735 01/01/24  1323 01/01/24  0608 12/31/23  0721 12/30/23  0715   *  --   --  132*  --  134*   POTASSIUM 3.6 3.8 3.7 3.3*   < > 3.9  3.9   CHLORIDE 99  --   --  98  --  101   CO2 27  --   --  26  --  25   BUN 12.9  --   --  9.0  --  11.3   CR 0.59  --   --  0.50*  --  0.50*   ANIONGAP 8  --   --  8  --  8   SARAH 8.4*  --   --  8.3*  --  7.9*   *  --   --  112*  --  96    < > = values in this interval not displayed.       Assessment/Plan:  (D47.959N) Closed torus fracture of distal end of right femur with routine healing, subsequent encounter  (primary encounter diagnosis)  (R53.81) Physical deconditioning  Comment: acute/ongoing, no change  Plan: NWB RLE, f/u with Dr. Kimball as directed  PT and OT, tylenol 650mg q 4 hours prn  Eliquis 2.5mg BID through 1/17/24 then start ASA 325mg BID dvt prophlaxis  Planning on discharge to home 1/20/24 w/c ordered for home use     (D62) Anemia due to blood loss, acute  Comment: acute/ongoing, no change  Hgb 10.4 1/8/24  Plan: Hgb follow     (E87.1) Hyponatremia  Comment: stable, no change  Na 137 1/8/24  Plan: BMP follow     (E87.6) hypokalmia  Acute/ogoing, no change  K+ 3.0 o 1/8/24 improved to 3.4 on 1/11/24  Plan: started on KCL 20meq QD       (I10) Benign essential hypertension  Comment: acute/ongoing, no change  Plan: BMP follow, continue atenolol 100mg QD, lisinopril 10mg QD     (K59.03) Drug-induced constipation  Comment: acute/ongoing, no change  Plan: senna s 1 PO BID prn       MED REC REQUIRED  Post Medication Reconciliation Status: medication reconcilation previously  completed during another office visit      Orders:  Ordering a w/c for home use      Wheelchair Documentation  Size: 18 x 16  Corresponding cushion: Yes: comfort curve  Standard foot rests: No  Elevating leg rests: Yes due to Fx of right femur  Arm rests: Yes: full length  Lap tray: No  Dose the patient use oxygen? No   Is the patient able to propel wheelchair? Yes If no why not? N/a And is there someone who can?yes  1. The patient has mobility limitations that impairs their ability to participate in one or more mobility related activities: Toileting, Feeding, Grooming, and Bathing.  The wheelchair is suitable and necessary for use in the patient's home.  2. The patient's mobility limitations cannot be safely resolved by using a cane/walker:Yes    Reason why a cane or walker will not meet the patient's needs. (ie: balance, tolerance, level of assistance) NWB to right leg d/t femur fracture  3. The patients home has adequate access to use a manual wheelchair:Yes  4. The use of a manual wheelchair on a regular basis will improve the patients ability to participate in mobility related ADL's at home:Yes  5. The patient is willing to use a manual wheelchair at home:Yes  6. The patient has adequate upper body strength and the mental capability to safely use a manual wheelchair and/or has a caregiver that is able to assist: Yes  7. Does the patient have a lower extremity injury or edema?Yes  Reason for Type of Wheelchair  Patient weight: 0 lbs 0 oz      Electronically signed by: Tonya Lynn Haase, APRN CNP         Sincerely,        Tonya Lynn Haase, APRN CNP

## 2024-01-16 NOTE — PROGRESS NOTES
"[facility] GERIATRICS    Chief Complaint   Patient presents with    RECHECK     HPI:  Mercedes Nelson is a 78 year old  (1945), who is being seen today for an episodic care visit at: [facility]) [25]. Today's concern is:   Right femur Fx: on exam today patient is resting in bed,  no c/o pain in right let at rest,  In therapy patient is working on stand pivot transfers and walking 10-40 feet using a RW with CGA, stand pivot transfers are independent plan is to discharge to home on 1/20/24 will order a w/c for home use  Anemia: Hgb 10.4 o 1/8/24  Hyponatremia/hypokalemia: Na 137 1/8/24 with K+ 3.0 repeat 1/11/24 Na 135 and K+ 3.4  HTN: /76, 129/87, 126/70  with HR 70 range, denies CP, palpitations, SOB  Constipation: patient states bowels are working       Allergies, and PMH/PSH reviewed in [facility] today.  REVIEW OF SYSTEMS:  10 point ROS of systems including Constitutional, Eyes, Respiratory, Cardiovascular, Gastroenterology, Genitourinary, Integumentary, Musculoskeletal, Psychiatric were all negative except for pertinent positives noted in my HPI.    Objective:   /75   Pulse 74   Temp 97.6  F (36.4  C)   Resp 16   Ht 1.727 m (5' 8\")   Wt 83 kg (183 lb)   SpO2 92%   BMI 27.83 kg/m    GENERAL APPEARANCE:  Alert, in no distress  ENT:  Mouth and posterior oropharynx normal, moist mucous membranes, normal hearing acuity  EYES:  EOM, conjunctivae, lids, pupils and irises normal, PERRL  RESP:  respiratory effort and palpation of chest normal, lungs clear to auscultation , no respiratory distress  CV:  Palpation and auscultation of heart done , regular rate and rhythm, no murmur, rub, or gallop, peripheral edema 1+ in LE bilaterally  ABDOMEN:  normal bowel sounds, soft, nontender, no hepatosplenomegaly or other masses  M/S:   patient resting in bed  SKIN:  Inspection of skin and subcutaneous tissue baseline  NEURO:   speech wnl  PSYCH:  affect and mood normal    Recent labs in [facility] " reviewed by me today.  and Most Recent 3 CBC's:  Recent Labs   Lab Test 01/03/24  0604 01/01/24  1442 01/01/24  0608 12/30/23  0715   WBC 6.5  --  6.1 6.6   HGB 8.6* 8.9* 8.7* 8.1*   MCV 97  --  94 95     --  213 170     Most Recent 3 BMP's:  Recent Labs   Lab Test 01/03/24  0604 01/02/24  0735 01/01/24  1323 01/01/24  0608 12/31/23  0721 12/30/23  0715   *  --   --  132*  --  134*   POTASSIUM 3.6 3.8 3.7 3.3*   < > 3.9  3.9   CHLORIDE 99  --   --  98  --  101   CO2 27  --   --  26  --  25   BUN 12.9  --   --  9.0  --  11.3   CR 0.59  --   --  0.50*  --  0.50*   ANIONGAP 8  --   --  8  --  8   SARAH 8.4*  --   --  8.3*  --  7.9*   *  --   --  112*  --  96    < > = values in this interval not displayed.       Assessment/Plan:  (N51.018F) Closed torus fracture of distal end of right femur with routine healing, subsequent encounter  (primary encounter diagnosis)  (R53.81) Physical deconditioning  Comment: acute/ongoing, no change  Plan: NWB RLE, f/u with Dr. Kimball as directed  PT and OT, tylenol 650mg q 4 hours prn  Eliquis 2.5mg BID through 1/17/24 then start ASA 325mg BID dvt prophlaxis  Planning on discharge to home 1/20/24 w/c ordered for home use     (D62) Anemia due to blood loss, acute  Comment: acute/ongoing, no change  Hgb 10.4 1/8/24  Plan: Hgb follow     (E87.1) Hyponatremia  Comment: stable, no change  Na 137 1/8/24  Plan: BMP follow     (E87.6) hypokalmia  Acute/ogoing, no change  K+ 3.0 o 1/8/24 improved to 3.4 on 1/11/24  Plan: started on KCL 20meq QD       (I10) Benign essential hypertension  Comment: acute/ongoing, no change  Plan: BMP follow, continue atenolol 100mg QD, lisinopril 10mg QD     (K59.03) Drug-induced constipation  Comment: acute/ongoing, no change  Plan: senna s 1 PO BID prn       MED REC REQUIRED  Post Medication Reconciliation Status: medication reconcilation previously completed during another office visit      Orders:  Ordering a w/c for home use      Wheelchair  Documentation  Size: 18 x 16  Corresponding cushion: Yes: comfort curve  Standard foot rests: No  Elevating leg rests: Yes due to Fx of right femur  Arm rests: Yes: full length  Lap tray: No  Dose the patient use oxygen? No   Is the patient able to propel wheelchair? Yes If no why not? N/a And is there someone who can?yes  1. The patient has mobility limitations that impairs their ability to participate in one or more mobility related activities: Toileting, Feeding, Grooming, and Bathing.  The wheelchair is suitable and necessary for use in the patient's home.  2. The patient's mobility limitations cannot be safely resolved by using a cane/walker:Yes    Reason why a cane or walker will not meet the patient's needs. (ie: balance, tolerance, level of assistance) NWB to right leg d/t femur fracture  3. The patients home has adequate access to use a manual wheelchair:Yes  4. The use of a manual wheelchair on a regular basis will improve the patients ability to participate in mobility related ADL's at home:Yes  5. The patient is willing to use a manual wheelchair at home:Yes  6. The patient has adequate upper body strength and the mental capability to safely use a manual wheelchair and/or has a caregiver that is able to assist: Yes  7. Does the patient have a lower extremity injury or edema?Yes  Reason for Type of Wheelchair  Patient weight: 0 lbs 0 oz      Electronically signed by: Tonya Lynn Haase, APRN CNP

## 2024-01-17 VITALS
OXYGEN SATURATION: 93 % | SYSTOLIC BLOOD PRESSURE: 110 MMHG | TEMPERATURE: 98.1 F | DIASTOLIC BLOOD PRESSURE: 70 MMHG | HEART RATE: 77 BPM | HEIGHT: 68 IN | BODY MASS INDEX: 27.74 KG/M2 | WEIGHT: 183 LBS | RESPIRATION RATE: 18 BRPM

## 2024-01-17 NOTE — PROGRESS NOTES
[facility] GERIATRICS DISCHARGE SUMMARY  PATIENT'S NAME: Mercedes Nelson  YOB: 1945  MEDICAL RECORD NUMBER:  [id number]  Place of Service where encounter took place:  [facility]) [25]    PRIMARY CARE PROVIDER AND CLINIC RESPONSIBLE AFTER TRANSFER:   Bill Castro MD, [address] / [place] [address]    [facility] Provider     Transferring providers: Tonya Lynn Haase, APRN CNP, Gavin Crockett MD  Recent Hospitalization/ED:  [facility] Hospital stay 12/27/23 to 1/3/24.  Date of SNF Admission: January 03, 2024  Date of SNF (anticipated) Discharge: January 20, 2024  Discharged to: previous independent home  Cognitive Scores: BIMS: 15/15 and Short blessed: 0/28  Physical Function: Wheelchair dependent  DME: Wheelchair and Walker    CODE STATUS/ADVANCE DIRECTIVES DISCUSSION:  Prior   ALLERGIES: Dust mites and Iodine-131    NURSING FACILITY COURSE   Medication Changes/Rationale:   See assessment and plan    Summary of nursing facility stay:   Patient progressed to being independent from a w/c level, able to transfer on her own, able to Hop using RW up to 20 feet. Patient will discharge to her sister's home, will have home PT and HHA  through [facility]    Discharge Medications:  MED REC REQUIRED  Post Medication Reconciliation Status: discharge medications reconciled and changed, per note/orders       Current Outpatient Medications   Medication Sig Dispense Refill    acetaminophen (TYLENOL) 325 MG tablet Take 2 tablets (650 mg) by mouth every 4 hours as needed for other (For optimal non-opioid multimodal pain management to improve pain control.) 30 tablet 0    apixaban ANTICOAGULANT (ELIQUIS) 2.5 MG tablet Take 1 tablet (2.5 mg) by mouth 2 times daily 30 tablet 0    aspirin (ASA) 325 MG EC tablet Take 1 tablet (325 mg) by mouth 2 times daily Resume when apixaban completed      atenolol (TENORMIN) 100 MG tablet Take 100 mg by mouth daily      B Complex Vitamins  "(VITAMIN  B COMPLEX) tablet Take 1 tablet by mouth daily      clobetasol (TEMOVATE) 0.05 % external ointment Apply thin layer to affected area daily      Coenzyme Q10 (COQ-10 PO) Take 1 tablet by mouth daily      lisinopril (ZESTRIL) 10 MG tablet Take 1 tablet (10 mg) by mouth daily      Multiple Vitamin (MULTI-VITAMIN) per tablet Take 1 tablet by mouth daily      potassium chloride ER (KLOR-CON M) 10 MEQ CR tablet Take 2 tablets (20 mEq) by mouth daily      prednisoLONE acetate (PRED FORTE) 1 % ophthalmic suspension Place 1 drop into both eyes 4 times daily      rosuvastatin (CRESTOR) 20 MG tablet Take 20 mg by mouth daily      senna-docusate (SENOKOT-S/PERICOLACE) 8.6-50 MG tablet Take 1 tablet by mouth 2 times daily as needed for constipation      VITAMIN D, CHOLECALCIFEROL, PO Take 1 capsule by mouth daily            Controlled medications:   not applicable/none     Past Medical History:   Past Medical History:   Diagnosis Date    Dyslipidemia     Hypertension     Osteoarthritis      Physical Exam:   Vitals: /70   Pulse 77   Temp 98.1  F (36.7  C)   Resp 18   Ht 1.727 m (5' 8\")   Wt 83 kg (183 lb)   SpO2 93%   BMI 27.83 kg/m    BMI: Body mass index is 27.83 kg/m .  GENERAL APPEARANCE:  Alert, in no distress  ENT:  Mouth and posterior oropharynx normal, moist mucous membranes, normal hearing acuity  EYES:  EOM, conjunctivae, lids, pupils and irises normal, PERRL  RESP:  respiratory effort and palpation of chest normal, lungs clear to auscultation , no respiratory distress  CV:  Palpation and auscultation of heart done , regular rate and rhythm, no murmur, rub, or gallop, no edema  ABDOMEN:  normal bowel sounds, soft, nontender, no hepatosplenomegaly or other masses  M/S:   patient sitting up in w/c at time of exam  SKIN:  Inspection of skin and subcutaneous tissue baseline, did not visualize surgical incision  NEURO:   speech wnl  PSYCH:  affect and mood normal     SNF labs: Recent labs in [facility] " reviewed by me today.  and Most Recent 3 CBC's:  Recent Labs   Lab Test 01/03/24  0604 01/01/24  1442 01/01/24  0608 12/30/23  0715   WBC 6.5  --  6.1 6.6   HGB 8.6* 8.9* 8.7* 8.1*   MCV 97  --  94 95     --  213 170     Most Recent 3 BMP's:  Recent Labs   Lab Test 01/03/24  0604 01/02/24  0735 01/01/24  1323 01/01/24  0608 12/31/23  0721 12/30/23  0715   *  --   --  132*  --  134*   POTASSIUM 3.6 3.8 3.7 3.3*   < > 3.9  3.9   CHLORIDE 99  --   --  98  --  101   CO2 27  --   --  26  --  25   BUN 12.9  --   --  9.0  --  11.3   CR 0.59  --   --  0.50*  --  0.50*   ANIONGAP 8  --   --  8  --  8   SARAH 8.4*  --   --  8.3*  --  7.9*   *  --   --  112*  --  96    < > = values in this interval not displayed.     Assessment and Plan:   (I91.507C) Closed torus fracture of distal end of right femur with routine healing, subsequent encounter  (primary encounter diagnosis)  (R53.81) Physical deconditioning  Comment: acute/ongoing  Plan: NWB RLE, f/u with Dr. Kimball as directed  Discharge to Sister's home with home PT and HHA through [facility]  tylenol 650mg q 4 hours prn  Eliquis 2.5mg BID through 1/17/24 now on ASA 325mg BID dvt prophlaxis  W/c was ordered for home use     (D62) Anemia due to blood loss, acute  Comment: acute/ongoing  Hgb 10.4 1/8/24  Plan: f/u with PCP     (E87.1) Hyponatremia  Comment: stable  Na 137 1/8/24  Plan: f/u with PCP     (E87.6) hypokalmia  Acute/ogoing  K+ 3.0 o 1/8/24 improved to 3.4 on 1/11/24  Plan: started on KCL 20meq QD during TCU stay        (I10) Benign essential hypertension  Comment: acute/ongoing  Plan: continue atenolol 100mg QD, lisinopril 10mg QD     (K59.03) Drug-induced constipation  Comment: acute/ongoing  Plan: senna s 1 PO BID prn       DISCHARGE PLAN:  Follow up labs: No labs orders/due  Medical Follow Up:      Follow up with primary care provider in 1-2 weeks  [facility] scheduled appointments:     Discharge Services: Home Care:  Physical Therapy and Home  Health Aide  Discharge Instructions Verbalized to Patient at Discharge:   Weight bearing restrictions:  Non-weight bearing.     TOTAL DISCHARGE TIME:   Greater than 30 minutes  Electronically signed by:  Tonya Lynn Haase, APRN CNP

## 2024-01-18 ENCOUNTER — DISCHARGE SUMMARY NURSING HOME (OUTPATIENT)
Dept: GERIATRICS | Facility: CLINIC | Age: 79
End: 2024-01-18
Payer: COMMERCIAL

## 2024-01-18 DIAGNOSIS — E87.1 HYPONATREMIA: ICD-10-CM

## 2024-01-18 DIAGNOSIS — D62 ANEMIA DUE TO BLOOD LOSS, ACUTE: ICD-10-CM

## 2024-01-18 DIAGNOSIS — K59.03 DRUG-INDUCED CONSTIPATION: ICD-10-CM

## 2024-01-18 DIAGNOSIS — I10 BENIGN ESSENTIAL HYPERTENSION: ICD-10-CM

## 2024-01-18 DIAGNOSIS — R53.81 PHYSICAL DECONDITIONING: ICD-10-CM

## 2024-01-18 DIAGNOSIS — S72.471D CLOSED TORUS FRACTURE OF DISTAL END OF RIGHT FEMUR WITH ROUTINE HEALING, SUBSEQUENT ENCOUNTER: Primary | ICD-10-CM

## 2024-01-18 DIAGNOSIS — E87.6 HYPOKALEMIA: ICD-10-CM

## 2024-01-18 PROCEDURE — 99316 NF DSCHRG MGMT 30 MIN+: CPT | Performed by: NURSE PRACTITIONER

## 2024-01-18 NOTE — LETTER
1/18/2024        RE: Mercedes Nelson  [address]  [place] [address]        [facility] GERIATRICS DISCHARGE SUMMARY  PATIENT'S NAME: Mercedes Nelson  YOB: 1945  MEDICAL RECORD NUMBER:  [id number]  [facility]    PRIMARY CARE PROVIDER AND CLINIC RESPONSIBLE AFTER TRANSFER:   Bill Castro MD, [address] / [place] [address]    [facility] [place]     Transferring providers: Tonya Lynn Haase, APRN CNP, Gavin Crockett MD  Recent Hospitalization/ED:  [facility] Hospital stay 12/27/23 to 1/3/24.  Date of SNF Admission: January 03, 2024  Date of SNF (anticipated) Discharge: January 20, 2024  Discharged to: previous independent home  Cognitive Scores: BIMS: 15/15 and Short blessed: 0/28  Physical Function: Wheelchair dependent  DME: Wheelchair and Walker    CODE STATUS/ADVANCE DIRECTIVES DISCUSSION:  Prior   ALLERGIES: Dust mites and Iodine-131    NURSING FACILITY COURSE   Medication Changes/Rationale:   See assessment and plan    Summary of nursing facility stay:   Patient progressed to being independent from a w/c level, able to transfer on her own, able to Hop using RW up to 20 feet. Patient will discharge to her sister's home, will have home PT and HHA  through [facility]    Discharge Medications:  MED REC REQUIRED  Post Medication Reconciliation Status: discharge medications reconciled and changed, per note/orders       Current Outpatient Medications   Medication Sig Dispense Refill     acetaminophen (TYLENOL) 325 MG tablet Take 2 tablets (650 mg) by mouth every 4 hours as needed for other (For optimal non-opioid multimodal pain management to improve pain control.) 30 tablet 0     apixaban ANTICOAGULANT (ELIQUIS) 2.5 MG tablet Take 1 tablet (2.5 mg) by mouth 2 times daily 30 tablet 0     aspirin (ASA) 325 MG EC tablet Take 1 tablet (325 mg) by mouth 2 times daily Resume when apixaban completed  "      atenolol (TENORMIN) 100 MG tablet Take 100 mg by mouth daily       B Complex Vitamins (VITAMIN  B COMPLEX) tablet Take 1 tablet by mouth daily       clobetasol (TEMOVATE) 0.05 % external ointment Apply thin layer to affected area daily       Coenzyme Q10 (COQ-10 PO) Take 1 tablet by mouth daily       lisinopril (ZESTRIL) 10 MG tablet Take 1 tablet (10 mg) by mouth daily       Multiple Vitamin (MULTI-VITAMIN) per tablet Take 1 tablet by mouth daily       potassium chloride ER (KLOR-CON M) 10 MEQ CR tablet Take 2 tablets (20 mEq) by mouth daily       prednisoLONE acetate (PRED FORTE) 1 % ophthalmic suspension Place 1 drop into both eyes 4 times daily       rosuvastatin (CRESTOR) 20 MG tablet Take 20 mg by mouth daily       senna-docusate (SENOKOT-S/PERICOLACE) 8.6-50 MG tablet Take 1 tablet by mouth 2 times daily as needed for constipation       VITAMIN D, CHOLECALCIFEROL, PO Take 1 capsule by mouth daily            Controlled medications:   not applicable/none     Past Medical History:   Past Medical History:   Diagnosis Date     Dyslipidemia      Hypertension      Osteoarthritis      Physical Exam:   Vitals: /70   Pulse 77   Temp 98.1  F (36.7  C)   Resp 18   Ht 1.727 m (5' 8\")   Wt 83 kg (183 lb)   SpO2 93%   BMI 27.83 kg/m    BMI: Body mass index is 27.83 kg/m .  GENERAL APPEARANCE:  Alert, in no distress  ENT:  Mouth and posterior oropharynx normal, moist mucous membranes, normal hearing acuity  EYES:  EOM, conjunctivae, lids, pupils and irises normal, PERRL  RESP:  respiratory effort and palpation of chest normal, lungs clear to auscultation , no respiratory distress  CV:  Palpation and auscultation of heart done , regular rate and rhythm, no murmur, rub, or gallop, no edema  ABDOMEN:  normal bowel sounds, soft, nontender, no hepatosplenomegaly or other masses  M/S:   patient sitting up in w/c at time of exam  SKIN:  Inspection of skin and subcutaneous tissue baseline, did not visualize " surgical incision  NEURO:   speech wnl  PSYCH:  affect and mood normal     SNF labs: Recent labs in [facility] reviewed by me today.  and Most Recent 3 CBC's:  Recent Labs   Lab Test 01/03/24  0604 01/01/24  1442 01/01/24  0608 12/30/23  0715   WBC 6.5  --  6.1 6.6   HGB 8.6* 8.9* 8.7* 8.1*   MCV 97  --  94 95     --  213 170     Most Recent 3 BMP's:  Recent Labs   Lab Test 01/03/24  0604 01/02/24  0735 01/01/24  1323 01/01/24  0608 12/31/23  0721 12/30/23  0715   *  --   --  132*  --  134*   POTASSIUM 3.6 3.8 3.7 3.3*   < > 3.9  3.9   CHLORIDE 99  --   --  98  --  101   CO2 27  --   --  26  --  25   BUN 12.9  --   --  9.0  --  11.3   CR 0.59  --   --  0.50*  --  0.50*   ANIONGAP 8  --   --  8  --  8   SARAH 8.4*  --   --  8.3*  --  7.9*   *  --   --  112*  --  96    < > = values in this interval not displayed.     Assessment and Plan:   (D06.664B) Closed torus fracture of distal end of right femur with routine healing, subsequent encounter  (primary encounter diagnosis)  (R53.81) Physical deconditioning  Comment: acute/ongoing  Plan: NWB RLE, f/u with Dr. Kimball as directed  Discharge to Sister's home with home PT and HHA through [facility]  tylenol 650mg q 4 hours prn  Eliquis 2.5mg BID through 1/17/24 now on ASA 325mg BID dvt prophlaxis  W/c was ordered for home use     (D62) Anemia due to blood loss, acute  Comment: acute/ongoing  Hgb 10.4 1/8/24  Plan: f/u with PCP     (E87.1) Hyponatremia  Comment: stable  Na 137 1/8/24  Plan: f/u with PCP     (E87.6) hypokalmia  Acute/ogoing  K+ 3.0 o 1/8/24 improved to 3.4 on 1/11/24  Plan: started on KCL 20meq QD during TCU stay        (I10) Benign essential hypertension  Comment: acute/ongoing  Plan: continue atenolol 100mg QD, lisinopril 10mg QD     (K59.03) Drug-induced constipation  Comment: acute/ongoing  Plan: senna s 1 PO BID prn       DISCHARGE PLAN:  Follow up labs: No labs orders/due  Medical Follow Up:      Follow up with primary care provider in 1-2  weeks  Current [place] scheduled appointments:     Discharge Services: Home Care:  Physical Therapy and Home Health Aide  Discharge Instructions Verbalized to Patient at Discharge:   Weight bearing restrictions:  Non-weight bearing.     TOTAL DISCHARGE TIME:   Greater than 30 minutes  Electronically signed by:  Tonya Lynn Haase, APRN CNP                   Sincerely,        Tonya Lynn Haase, APRN CNP

## 2024-03-17 ENCOUNTER — HEALTH MAINTENANCE LETTER (OUTPATIENT)
Age: 79
End: 2024-03-17

## 2024-04-29 ENCOUNTER — TRANSFERRED RECORDS (OUTPATIENT)
Dept: HEALTH INFORMATION MANAGEMENT | Facility: CLINIC | Age: 79
End: 2024-04-29
Payer: COMMERCIAL

## 2024-04-29 ENCOUNTER — MEDICAL CORRESPONDENCE (OUTPATIENT)
Dept: HEALTH INFORMATION MANAGEMENT | Facility: CLINIC | Age: 79
End: 2024-04-29
Payer: COMMERCIAL

## 2024-04-29 DIAGNOSIS — R06.02 SOB (SHORTNESS OF BREATH): Primary | ICD-10-CM

## 2024-04-29 DIAGNOSIS — R60.0 PERIPHERAL EDEMA: ICD-10-CM

## 2024-04-30 ENCOUNTER — HOSPITAL ENCOUNTER (OUTPATIENT)
Dept: CARDIOLOGY | Facility: CLINIC | Age: 79
Discharge: HOME | End: 2024-04-30
Attending: FAMILY MEDICINE | Admitting: FAMILY MEDICINE
Payer: COMMERCIAL

## 2024-04-30 DIAGNOSIS — R60.0 PERIPHERAL EDEMA: ICD-10-CM

## 2024-04-30 DIAGNOSIS — R06.02 SOB (SHORTNESS OF BREATH): ICD-10-CM

## 2024-04-30 LAB — LVEF ECHO: NORMAL

## 2024-04-30 PROCEDURE — 93306 TTE W/DOPPLER COMPLETE: CPT | Mod: 26 | Performed by: INTERNAL MEDICINE

## 2024-04-30 PROCEDURE — 93306 TTE W/DOPPLER COMPLETE: CPT

## 2024-05-03 ENCOUNTER — TELEPHONE (OUTPATIENT)
Dept: CARDIOLOGY | Facility: CLINIC | Age: 79
End: 2024-05-03

## 2024-05-03 ENCOUNTER — LAB (OUTPATIENT)
Dept: LAB | Facility: CLINIC | Age: 79
End: 2024-05-03
Payer: COMMERCIAL

## 2024-05-03 ENCOUNTER — ORDERS ONLY (AUTO-RELEASED) (OUTPATIENT)
Dept: CARDIOLOGY | Facility: CLINIC | Age: 79
End: 2024-05-03
Payer: COMMERCIAL

## 2024-05-03 ENCOUNTER — OFFICE VISIT (OUTPATIENT)
Dept: CARDIOLOGY | Facility: CLINIC | Age: 79
End: 2024-05-03
Attending: FAMILY MEDICINE
Payer: COMMERCIAL

## 2024-05-03 VITALS
SYSTOLIC BLOOD PRESSURE: 148 MMHG | HEIGHT: 70 IN | BODY MASS INDEX: 26.18 KG/M2 | WEIGHT: 182.9 LBS | OXYGEN SATURATION: 97 % | HEART RATE: 67 BPM | DIASTOLIC BLOOD PRESSURE: 92 MMHG

## 2024-05-03 DIAGNOSIS — I50.33 ACUTE ON CHRONIC DIASTOLIC CONGESTIVE HEART FAILURE (H): ICD-10-CM

## 2024-05-03 DIAGNOSIS — R09.89 LABILE HYPERTENSION: ICD-10-CM

## 2024-05-03 DIAGNOSIS — E87.6 HYPOKALEMIA: ICD-10-CM

## 2024-05-03 DIAGNOSIS — R60.0 PERIPHERAL EDEMA: ICD-10-CM

## 2024-05-03 DIAGNOSIS — R06.02 SOB (SHORTNESS OF BREATH): ICD-10-CM

## 2024-05-03 DIAGNOSIS — I48.0 PAF (PAROXYSMAL ATRIAL FIBRILLATION) (H): ICD-10-CM

## 2024-05-03 DIAGNOSIS — I48.0 PAF (PAROXYSMAL ATRIAL FIBRILLATION) (H): Primary | ICD-10-CM

## 2024-05-03 DIAGNOSIS — R60.9 DEPENDENT EDEMA: ICD-10-CM

## 2024-05-03 LAB — TSH SERPL DL<=0.005 MIU/L-ACNC: 3.96 UIU/ML (ref 0.3–4.2)

## 2024-05-03 PROCEDURE — 93000 ELECTROCARDIOGRAM COMPLETE: CPT | Performed by: INTERNAL MEDICINE

## 2024-05-03 PROCEDURE — 84443 ASSAY THYROID STIM HORMONE: CPT | Performed by: INTERNAL MEDICINE

## 2024-05-03 PROCEDURE — 36415 COLL VENOUS BLD VENIPUNCTURE: CPT | Performed by: INTERNAL MEDICINE

## 2024-05-03 PROCEDURE — 99215 OFFICE O/P EST HI 40 MIN: CPT | Performed by: INTERNAL MEDICINE

## 2024-05-03 PROCEDURE — G2211 COMPLEX E/M VISIT ADD ON: HCPCS | Performed by: INTERNAL MEDICINE

## 2024-05-03 RX ORDER — METOPROLOL SUCCINATE 50 MG/1
50 TABLET, EXTENDED RELEASE ORAL DAILY
Qty: 90 TABLET | Refills: 4 | Status: SHIPPED | OUTPATIENT
Start: 2024-05-03 | End: 2024-05-03

## 2024-05-03 RX ORDER — AMOXICILLIN AND CLAVULANATE POTASSIUM 500; 125 MG/1; MG/1
1 TABLET, FILM COATED ORAL
COMMUNITY
Start: 2023-11-17 | End: 2024-05-03

## 2024-05-03 RX ORDER — POTASSIUM CHLORIDE 750 MG/1
20 TABLET, EXTENDED RELEASE ORAL DAILY
Qty: 90 TABLET | Refills: 4 | Status: SHIPPED | OUTPATIENT
Start: 2024-05-03 | End: 2024-05-06

## 2024-05-03 RX ORDER — IRBESARTAN 75 MG/1
1 TABLET ORAL
COMMUNITY
Start: 2024-03-20

## 2024-05-03 RX ORDER — CARVEDILOL 25 MG/1
25 TABLET ORAL 2 TIMES DAILY WITH MEALS
Qty: 180 TABLET | Refills: 4 | Status: SHIPPED | OUTPATIENT
Start: 2024-05-03

## 2024-05-03 NOTE — TELEPHONE ENCOUNTER
----- Message from Talat Medrano MD sent at 5/3/2024 12:12 PM CDT -----  TSH is okay.  Check because of newly diagnosed A-fib  ----- Message -----  From: Lab, Background User  Sent: 5/3/2024  11:53 AM CDT  To: Talat Medrano MD   Sent via EMR  Notified patient   Will  tomorrow   Diarrhea improved imodium

## 2024-05-03 NOTE — TELEPHONE ENCOUNTER
Spoke with patient to review that the coreg is in place of the atenolol. She has already picked up the new script.  Reviewed with patient to wait to start the ziopatch until she has been on the new coreg for at least a week. Patient verbalized understanding and agreed with plan.

## 2024-05-03 NOTE — TELEPHONE ENCOUNTER
[facility] Call Center    Phone Message    May a detailed message be left on voicemail: yes     Reason for Call: Other: Patient believes Dr Medrano wanted her to start a water pill. It is not on her AVS or was sent to pharmacy. Please call patient to verify.     Action Taken: Other: cardio    Travel Screening: Not Applicable

## 2024-05-03 NOTE — PROGRESS NOTES
HPI and Plan:   Mercedes is a very nice 78-year-old woman who follows with my partner Dr. Best Muhammad.  She is now sent into the clinic urgently.  As she has been found to have newly diagnosed atrial fibrillation.    She has longstanding labile hypertension, hypercholesterolemia, hypokalemia, dependent edema.  In December she was seen in the ER for her labile hypertension.  Later in December she again was seen after mechanical fall resulting in a fracture of her right femur.  On all these visits and previous event monitoring she is not been found to have A-fib although there was suspicion that she may have it.    She states for 3 weeks she has had increased cough.  She has worsening peripheral edema left greater than right and PND.    She was seen in her primary care office where chest x-ray demonstrated enlarged cardiac silhouette.  Subsequent echocardiogram noted atrial fibrillation.  She is completely unaware of her atrial fibrillation.    Family history is significant for mother dying at age 94.  Her father had coronary disease at age 63.  She is a lifelong non-smoker.  She does not drink alcohol.  She does not appear to have diabetes although some sugars are elevated possibly impaired fasting glucose versus nonfasting blood draws.    She denies exertional chest arm neck jaw or shoulder discomfort.  She has no lightheadedness dizziness syncope or near syncope.  She states she may be aware of her rhythm when she lies down at nighttime.    Echocardiogram demonstrates ejection fraction of 50 to 55% with moderate concentric left ventricular hypertrophy she has mild to moderate mitral regurgitation and tricuspid regurgitation.  Ascending aorta is borderline dilated at 3.8 cm.  IVC is described as dilated with decreased respiratory variation.  Pulmonary pressures are estimated to be 19 mmHg plus right atrial pressure    Review of the chart shows normal CBC.  She had an N-terminal proBNP of 3644.  Glucose is elevated at  126.  Most recent potassium is 3.3 EKG demonstrates atrial fibrillation with a rate of 85 with left anterior fascicular block    Assessment and plan.  Olga has newly diagnosed atrial fibrillation.  She is minimally aware of it.  Rate is controlled.  I will check a 7-day Zio patch.   I will start her on Eliquis 5 mg twice daily.  [id number] score is at least 4 for age x 2, gender, hypertension.  I will discontinue her aspirin.  I will check a TSH.    I will check a Lexiscan to evaluate for underlying coronary artery disease.    Of note she has difficult to control hypertension with a markedly low potassium consistently.  I suspect she may be hyper Manuel and we will send off an aldosterone renin ratio.  Then I will start her on spironolactone 25 mg daily.  This will help with heart failure, hypertension and hypokalemia.  She has not been taking her potassium supplement I recommend that she start that again    June clearly has congestive heart failure acute on chronic on a diastolic basis.  I think this is multifactorial partially due to her A-fib.  Partially due to her poorly controlled blood pressure and diastolic dysfunction.  I will start her on spironolactone.  Will get good control of her blood pressure.  I will diurese as able.  Will consider starting Jardiance down the road.      As her blood pressure is not well-controlled I will switch her atenolol to carvedilol 25 mg twice daily.  I will check her Zio Patch after she has been on the carvedilol for 1 week.    I will have her follow-up with me in 1 month to review all the studies.  I will recheck a BMP and N-terminal proBNP at that time.    Thank you for allow me to participate in this patient's care.  Sincerely,                               Talat Medrano MD [facility]    The longitudinal plan of care for (congestive heart failure, newly diagnosed atrial fibrillation, poorly controlled hypertension, hypokalemia and Mercedes) was addressed during this visit. Due  to the added complexity in care, I will continue to support Yamilet in the subsequent management of this condition(s) and with the ongoing continuity of care of these conditions      Today's clinic visit entailed:  Review of the result(s) of each unique test - echocardiogram, EKG, lab work, outside record  The following tests were independently interpreted by me as noted in my documentation: EKG  Ordering of each unique test  Prescription drug management  75 minutes spent by me on the date of the encounter doing chart review, history and exam, documentation and further activities per the note  Provider  Link to MDM Help Grid     The level of medical decision making during this visit was of high complexity.      Orders Placed This Encounter   Procedures    TSH with free T4 reflex    Basic metabolic panel    N terminal pro BNP outpatient    Follow-Up with Cardiology    EKG 12-lead complete w/read - Clinics (performed today)    Aldosterone Renin Ratio       Orders Placed This Encounter   Medications    irbesartan (AVAPRO) 75 MG tablet     Sig: Take 1 tablet by mouth daily at 2 pm    DISCONTD: amoxicillin-clavulanate (AUGMENTIN) 500-125 MG tablet     Sig: Take 1 tablet by mouth 2 times daily    apixaban ANTICOAGULANT (ELIQUIS ANTICOAGULANT) 5 MG tablet     Sig: Take 1 tablet (5 mg) by mouth 2 times daily     Dispense:  180 tablet     Refill:  4    potassium chloride mary ER (KLOR-CON M10) 10 MEQ CR tablet     Sig: Take 2 tablets (20 mEq) by mouth daily     Dispense:  90 tablet     Refill:  4       Medications Discontinued During This Encounter   Medication Reason    aspirin (ASA) 325 MG EC tablet     lisinopril (ZESTRIL) 10 MG tablet     amoxicillin-clavulanate (AUGMENTIN) 500-125 MG tablet     potassium chloride ER (KLOR-CON M) 10 MEQ CR tablet Reorder (No AVS)         Encounter Diagnoses   Name Primary?    SOB (shortness of breath)     Peripheral edema     PAF (paroxysmal atrial fibrillation) (H) Yes    Hypokalemia      Labile hypertension     Acute on chronic diastolic congestive heart failure (H)     Dependent edema        CURRENT MEDICATIONS:  Current Outpatient Medications   Medication Sig Dispense Refill    acetaminophen (TYLENOL) 325 MG tablet Take 2 tablets (650 mg) by mouth every 4 hours as needed for other (For optimal non-opioid multimodal pain management to improve pain control.) 30 tablet 0    apixaban ANTICOAGULANT (ELIQUIS ANTICOAGULANT) 5 MG tablet Take 1 tablet (5 mg) by mouth 2 times daily 180 tablet 4    atenolol (TENORMIN) 100 MG tablet Take 100 mg by mouth daily      B Complex Vitamins (VITAMIN  B COMPLEX) tablet Take 1 tablet by mouth daily      clobetasol (TEMOVATE) 0.05 % external ointment Apply thin layer to affected area daily      Coenzyme Q10 (COQ-10 PO) Take 1 tablet by mouth daily      irbesartan (AVAPRO) 75 MG tablet Take 1 tablet by mouth daily at 2 pm      Multiple Vitamin (MULTI-VITAMIN) per tablet Take 1 tablet by mouth daily      potassium chloride mary ER (KLOR-CON M10) 10 MEQ CR tablet Take 2 tablets (20 mEq) by mouth daily 90 tablet 4    prednisoLONE acetate (PRED FORTE) 1 % ophthalmic suspension Place 1 drop into both eyes 4 times daily      rosuvastatin (CRESTOR) 20 MG tablet Take 20 mg by mouth daily      senna-docusate (SENOKOT-S/PERICOLACE) 8.6-50 MG tablet Take 1 tablet by mouth 2 times daily as needed for constipation      VITAMIN D, CHOLECALCIFEROL, PO Take 1 capsule by mouth daily         ALLERGIES     Allergies   Allergen Reactions    Dust Mites Other (See Comments)    Iodine-131 Angioedema, Hives and Itching       PAST MEDICAL HISTORY:  Past Medical History:   Diagnosis Date    Dyslipidemia     Hypertension     Osteoarthritis        PAST SURGICAL HISTORY:  Past Surgical History:   Procedure Laterality Date    CATARACT EXTRACTION, BILATERAL  04/2023    OPEN REDUCTION INTERNAL FIXATION FEMUR DISTAL Right 12/28/2023    Procedure: Open reduction internal fixation, right distal femur  fracture with retrograde nail;  Surgeon: Hal Kimball MD;  Location: [facility] OR       FAMILY HISTORY:  Family History   Problem Relation Age of Onset    Breast Cancer Mother 80    Myocardial Infarction Father 57         @ 63 from [place]       SOCIAL HISTORY:  Social History     Socioeconomic History    Marital status:      Spouse name: None    Number of children: None    Years of education: None    Highest education level: None   Tobacco Use    Smoking status: Never    Smokeless tobacco: Never   Substance and Sexual Activity    Alcohol use: No     Social Determinants of Health     Financial Resource Strain: Low Risk  (2023)    Financial Resource Strain     Within the past 12 months, have you or your family members you live with been unable to get utilities (heat, electricity) when it was really needed?: No   Food Insecurity: Low Risk  (2023)    Food Insecurity     Within the past 12 months, did you worry that your food would run out before you got money to buy more?: No     Within the past 12 months, did the food you bought just not last and you didn t have money to get more?: No   Transportation Needs: Low Risk  (2023)    Transportation Needs     Within the past 12 months, has lack of transportation kept you from medical appointments, getting your medicines, non-medical meetings or appointments, work, or from getting things that you need?: No   Interpersonal Safety: Low Risk  (2023)    Interpersonal Safety     Do you feel physically and emotionally safe where you currently live?: Yes     Within the past 12 months, have you been hit, slapped, kicked or otherwise physically hurt by someone?: No     Within the past 12 months, have you been humiliated or emotionally abused in other ways by your partner or ex-partner?: No   Housing Stability: Low Risk  (2023)    Housing Stability     Do you have housing? : Yes     Are you worried about losing your housing?: No       Review of Systems:  Skin:   "not assessed       Eyes:  not assessed      ENT:  not assessed      Respiratory:    shortness of breath;dyspnea on exertion;cough     Cardiovascular:    edema;Positive for    Gastroenterology: not assessed      Genitourinary:  not assessed      Musculoskeletal:  not assessed      Neurologic:  not assessed      Psychiatric:  not assessed      Heme/Lymph/Imm:  not assessed      Endocrine:  not assessed        Physical Exam:  Vitals: BP (!) 148/92 (BP Location: Right arm, Patient Position: Sitting, Cuff Size: Adult Regular)   Pulse 67   Ht 1.778 m (5' 10\")   Wt 83 kg (182 lb 14.4 oz)   SpO2 97%   BMI 26.24 kg/m      Constitutional:  cooperative, alert and oriented, well developed, well nourished, in no acute distress overweight Uses walker    Skin:  warm and dry to the touch, no apparent skin lesions or masses noted          Head:  normocephalic, no masses or lesions        Eyes:  pupils equal and round, conjunctivae and lids unremarkable, sclera white, no xanthalasma, EOMS intact, no nystagmus        Lymph:      ENT:  no pallor or cyanosis, dentition good        Neck:  no carotid bruit        Respiratory:  normal breath sounds, clear to auscultation, normal A-P diameter, normal symmetry, normal respiratory excursion, no use of accessory muscles         Cardiac: no murmurs, gallops or rubs detected irregularly irregular rhythm           Rate controlled  pulses full and equal                                        GI:           Extremities and Muscular Skeletal:  no spinal abnormalities noted;normal muscle strength and tone   bilateral LE edema;L greater than R;1+;pitting;2+          Neurological:  no gross motor deficits        Psych:  affect appropriate, oriented to time, person and place          Bill Castro MD  [address] [facility] [address]  [place] [address]                "

## 2024-05-03 NOTE — TELEPHONE ENCOUNTER
----- Message from Talat Medrano MD sent at 5/3/2024 10:28 AM CDT -----  Please call patient and tell her I want to change her atenolol 100 mg to carvedilol 25 mg twice daily.  Please call her pharmacy as I initially sent in a prescription for metoprolol succinate but changed my mind to carvedilol.  Stop the metoprolol succinate prescription.  Also asked her not to put her Zio Patch on until she has been on the carvedilol for 1 week.       Called [facility] to confirm carvedilol Rx, cancel metoprolol and atenolol.     Called patient to review med changes and ziopatch recommendation, left a message to call back.

## 2024-05-03 NOTE — LETTER
5/3/2024    Bill Castro MD  [address]  [facility] [address]    RE: Mercedes Nelson       Dear Colleague,     I had the pleasure of seeing Mercedes Nelson in the [facility].  HPI and Plan:   Mercedes is a very nice 78-year-old woman who follows with my partner Dr. Best Muhammad.  She is now sent into the clinic urgently.  As she has been found to have newly diagnosed atrial fibrillation.    She has longstanding labile hypertension, hypercholesterolemia, hypokalemia, dependent edema.  In December she was seen in the ER for her labile hypertension.  Later in December she again was seen after mechanical fall resulting in a fracture of her right femur.  On all these visits and previous event monitoring she is not been found to have A-fib although there was suspicion that she may have it.    She states for 3 weeks she has had increased cough.  She has worsening peripheral edema left greater than right and PND.    She was seen in her primary care office where chest x-ray demonstrated enlarged cardiac silhouette.  Subsequent echocardiogram noted atrial fibrillation.  She is completely unaware of her atrial fibrillation.    Family history is significant for mother dying at age 94.  Her father had coronary disease at age 63.  She is a lifelong non-smoker.  She does not drink alcohol.  She does not appear to have diabetes although some sugars are elevated possibly impaired fasting glucose versus nonfasting blood draws.    She denies exertional chest arm neck jaw or shoulder discomfort.  She has no lightheadedness dizziness syncope or near syncope.  She states she may be aware of her rhythm when she lies down at nighttime.    Echocardiogram demonstrates ejection fraction of 50 to 55% with moderate concentric left ventricular hypertrophy she has mild to moderate mitral regurgitation and tricuspid regurgitation.  Ascending aorta is borderline dilated at 3.8 cm.  IVC is described as dilated with  decreased respiratory variation.  Pulmonary pressures are estimated to be 19 mmHg plus right atrial pressure    Review of the chart shows normal CBC.  She had an N-terminal proBNP of 3644.  Glucose is elevated at 126.  Most recent potassium is 3.3 EKG demonstrates atrial fibrillation with a rate of 85 with left anterior fascicular block    Assessment and plan.  Olga has newly diagnosed atrial fibrillation.  She is minimally aware of it.  Rate is controlled.  I will check a 7-day Zio patch.   I will start her on Eliquis 5 mg twice daily.  [id number] score is at least 4 for age x 2, gender, hypertension.  I will discontinue her aspirin.  I will check a TSH.    I will check a Lexiscan to evaluate for underlying coronary artery disease.    Of note she has difficult to control hypertension with a markedly low potassium consistently.  I suspect she may be hyper Manuel and we will send off an aldosterone renin ratio.  Then I will start her on spironolactone 25 mg daily.  This will help with heart failure, hypertension and hypokalemia.  She has not been taking her potassium supplement I recommend that she start that again    June clearly has congestive heart failure acute on chronic on a diastolic basis.  I think this is multifactorial partially due to her A-fib.  Partially due to her poorly controlled blood pressure and diastolic dysfunction.  I will start her on spironolactone.  Will get good control of her blood pressure.  I will diurese as able.  Will consider starting Jardiance down the road.      As her blood pressure is not well-controlled I will switch her atenolol to carvedilol 25 mg twice daily.  I will check her Zio Patch after she has been on the carvedilol for 1 week.    I will have her follow-up with me in 1 month to review all the studies.  I will recheck a BMP and N-terminal proBNP at that time.    Thank you for allow me to participate in this patient's care.  Sincerely,                                Talat Medrano MD [facility]    The longitudinal plan of care for (congestive heart failure, newly diagnosed atrial fibrillation, poorly controlled hypertension, hypokalemia and Mercedes) was addressed during this visit. Due to the added complexity in care, I will continue to support Yamilet in the subsequent management of this condition(s) and with the ongoing continuity of care of these conditions      Today's clinic visit entailed:  Review of the result(s) of each unique test - echocardiogram, EKG, lab work, outside record  The following tests were independently interpreted by me as noted in my documentation: EKG  Ordering of each unique test  Prescription drug management  75 minutes spent by me on the date of the encounter doing chart review, history and exam, documentation and further activities per the note  Provider  Link to MDM Help Grid     The level of medical decision making during this visit was of high complexity.      Orders Placed This Encounter   Procedures    TSH with free T4 reflex    Basic metabolic panel    N terminal pro BNP outpatient    Follow-Up with Cardiology    EKG 12-lead complete w/read - Clinics (performed today)    Aldosterone Renin Ratio       Orders Placed This Encounter   Medications    irbesartan (AVAPRO) 75 MG tablet     Sig: Take 1 tablet by mouth daily at 2 pm    DISCONTD: amoxicillin-clavulanate (AUGMENTIN) 500-125 MG tablet     Sig: Take 1 tablet by mouth 2 times daily    apixaban ANTICOAGULANT (ELIQUIS ANTICOAGULANT) 5 MG tablet     Sig: Take 1 tablet (5 mg) by mouth 2 times daily     Dispense:  180 tablet     Refill:  4    potassium chloride mary ER (KLOR-CON M10) 10 MEQ CR tablet     Sig: Take 2 tablets (20 mEq) by mouth daily     Dispense:  90 tablet     Refill:  4       Medications Discontinued During This Encounter   Medication Reason    aspirin (ASA) 325 MG EC tablet     lisinopril (ZESTRIL) 10 MG tablet     amoxicillin-clavulanate (AUGMENTIN) 500-125 MG tablet     potassium  chloride ER (KLOR-CON M) 10 MEQ CR tablet Reorder (No AVS)         Encounter Diagnoses   Name Primary?    SOB (shortness of breath)     Peripheral edema     PAF (paroxysmal atrial fibrillation) (H) Yes    Hypokalemia     Labile hypertension     Acute on chronic diastolic congestive heart failure (H)     Dependent edema        CURRENT MEDICATIONS:  Current Outpatient Medications   Medication Sig Dispense Refill    acetaminophen (TYLENOL) 325 MG tablet Take 2 tablets (650 mg) by mouth every 4 hours as needed for other (For optimal non-opioid multimodal pain management to improve pain control.) 30 tablet 0    apixaban ANTICOAGULANT (ELIQUIS ANTICOAGULANT) 5 MG tablet Take 1 tablet (5 mg) by mouth 2 times daily 180 tablet 4    atenolol (TENORMIN) 100 MG tablet Take 100 mg by mouth daily      B Complex Vitamins (VITAMIN  B COMPLEX) tablet Take 1 tablet by mouth daily      clobetasol (TEMOVATE) 0.05 % external ointment Apply thin layer to affected area daily      Coenzyme Q10 (COQ-10 PO) Take 1 tablet by mouth daily      irbesartan (AVAPRO) 75 MG tablet Take 1 tablet by mouth daily at 2 pm      Multiple Vitamin (MULTI-VITAMIN) per tablet Take 1 tablet by mouth daily      potassium chloride mary ER (KLOR-CON M10) 10 MEQ CR tablet Take 2 tablets (20 mEq) by mouth daily 90 tablet 4    prednisoLONE acetate (PRED FORTE) 1 % ophthalmic suspension Place 1 drop into both eyes 4 times daily      rosuvastatin (CRESTOR) 20 MG tablet Take 20 mg by mouth daily      senna-docusate (SENOKOT-S/PERICOLACE) 8.6-50 MG tablet Take 1 tablet by mouth 2 times daily as needed for constipation      VITAMIN D, CHOLECALCIFEROL, PO Take 1 capsule by mouth daily         ALLERGIES     Allergies   Allergen Reactions    Dust Mites Other (See Comments)    Iodine-131 Angioedema, Hives and Itching       PAST MEDICAL HISTORY:  Past Medical History:   Diagnosis Date    Dyslipidemia     Hypertension     Osteoarthritis        PAST SURGICAL HISTORY:  Past  Surgical History:   Procedure Laterality Date    CATARACT EXTRACTION, BILATERAL  2023    OPEN REDUCTION INTERNAL FIXATION FEMUR DISTAL Right 2023    Procedure: Open reduction internal fixation, right distal femur fracture with retrograde nail;  Surgeon: Hal Kimball MD;  Location: [facility] OR       FAMILY HISTORY:  Family History   Problem Relation Age of Onset    Breast Cancer Mother 80    Myocardial Infarction Father 57         @ 63 from MI       SOCIAL HISTORY:  Social History     Socioeconomic History    Marital status:      Spouse name: None    Number of children: None    Years of education: None    Highest education level: None   Tobacco Use    Smoking status: Never    Smokeless tobacco: Never   Substance and Sexual Activity    Alcohol use: No     Social Determinants of Health     Financial Resource Strain: Low Risk  (2023)    Financial Resource Strain     Within the past 12 months, have you or your family members you live with been unable to get utilities (heat, electricity) when it was really needed?: No   Food Insecurity: Low Risk  (2023)    Food Insecurity     Within the past 12 months, did you worry that your food would run out before you got money to buy more?: No     Within the past 12 months, did the food you bought just not last and you didn t have money to get more?: No   Transportation Needs: Low Risk  (2023)    Transportation Needs     Within the past 12 months, has lack of transportation kept you from medical appointments, getting your medicines, non-medical meetings or appointments, work, or from getting things that you need?: No   Interpersonal Safety: Low Risk  (2023)    Interpersonal Safety     Do you feel physically and emotionally safe where you currently live?: Yes     Within the past 12 months, have you been hit, slapped, kicked or otherwise physically hurt by someone?: No     Within the past 12 months, have you been humiliated or emotionally abused in  "other ways by your partner or ex-partner?: No   Housing Stability: Low Risk  (12/1/2023)    Housing Stability     Do you have housing? : Yes     Are you worried about losing your housing?: No       Review of Systems:  Skin:  not assessed       Eyes:  not assessed      ENT:  not assessed      Respiratory:    shortness of breath;dyspnea on exertion;cough     Cardiovascular:    edema;Positive for    Gastroenterology: not assessed      Genitourinary:  not assessed      Musculoskeletal:  not assessed      Neurologic:  not assessed      Psychiatric:  not assessed      Heme/Lymph/Imm:  not assessed      Endocrine:  not assessed        Physical Exam:  Vitals: BP (!) 148/92 (BP Location: Right arm, Patient Position: Sitting, Cuff Size: Adult Regular)   Pulse 67   Ht 1.778 m (5' 10\")   Wt 83 kg (182 lb 14.4 oz)   SpO2 97%   BMI 26.24 kg/m      Constitutional:  cooperative, alert and oriented, well developed, well nourished, in no acute distress overweight Uses walker    Skin:  warm and dry to the touch, no apparent skin lesions or masses noted          Head:  normocephalic, no masses or lesions        Eyes:  pupils equal and round, conjunctivae and lids unremarkable, sclera white, no xanthalasma, EOMS intact, no nystagmus        Lymph:      ENT:  no pallor or cyanosis, dentition good        Neck:  no carotid bruit        Respiratory:  normal breath sounds, clear to auscultation, normal A-P diameter, normal symmetry, normal respiratory excursion, no use of accessory muscles         Cardiac: no murmurs, gallops or rubs detected irregularly irregular rhythm           Rate controlled  pulses full and equal                                        GI:           Extremities and Muscular Skeletal:  no spinal abnormalities noted;normal muscle strength and tone   bilateral LE edema;L greater than R;1+;pitting;2+          Neurological:  no gross motor deficits        Psych:  affect appropriate, oriented to time, person and place  "       CC  Bill Castro MD  [address]  [place] [address]    Thank you for allowing me to participate in the care of your patient.      Sincerely,     Talat Medrano MD     [facility]

## 2024-05-04 ENCOUNTER — HOSPITAL ENCOUNTER (EMERGENCY)
Facility: CLINIC | Age: 79
Discharge: HOME | End: 2024-05-04
Attending: EMERGENCY MEDICINE | Admitting: EMERGENCY MEDICINE
Payer: COMMERCIAL

## 2024-05-04 ENCOUNTER — APPOINTMENT (OUTPATIENT)
Dept: GENERAL RADIOLOGY | Facility: CLINIC | Age: 79
End: 2024-05-04
Attending: EMERGENCY MEDICINE
Payer: COMMERCIAL

## 2024-05-04 ENCOUNTER — APPOINTMENT (OUTPATIENT)
Dept: CT IMAGING | Facility: CLINIC | Age: 79
End: 2024-05-04
Attending: EMERGENCY MEDICINE
Payer: COMMERCIAL

## 2024-05-04 VITALS
HEART RATE: 102 BPM | BODY MASS INDEX: 27.2 KG/M2 | OXYGEN SATURATION: 97 % | HEIGHT: 69 IN | SYSTOLIC BLOOD PRESSURE: 171 MMHG | TEMPERATURE: 98.9 F | WEIGHT: 183.64 LBS | RESPIRATION RATE: 23 BRPM | DIASTOLIC BLOOD PRESSURE: 116 MMHG

## 2024-05-04 DIAGNOSIS — I27.20 PULMONARY HYPERTENSION (H): ICD-10-CM

## 2024-05-04 DIAGNOSIS — I48.91 ATRIAL FIBRILLATION, UNSPECIFIED TYPE (H): ICD-10-CM

## 2024-05-04 DIAGNOSIS — S22.23XA CLOSED STERNAL MANUBRIAL DISSOCIATION, INITIAL ENCOUNTER: ICD-10-CM

## 2024-05-04 DIAGNOSIS — R91.8 PULMONARY INFILTRATE: ICD-10-CM

## 2024-05-04 DIAGNOSIS — I50.33 ACUTE ON CHRONIC DIASTOLIC CONGESTIVE HEART FAILURE (H): ICD-10-CM

## 2024-05-04 DIAGNOSIS — J18.0 BRONCHOPNEUMONIA: ICD-10-CM

## 2024-05-04 DIAGNOSIS — R06.2 WHEEZING: ICD-10-CM

## 2024-05-04 DIAGNOSIS — J40 BRONCHITIS: ICD-10-CM

## 2024-05-04 LAB
ALBUMIN SERPL BCG-MCNC: 3.9 G/DL (ref 3.5–5.2)
ALP SERPL-CCNC: 74 U/L (ref 40–150)
ALT SERPL W P-5'-P-CCNC: 26 U/L (ref 0–50)
ANION GAP SERPL CALCULATED.3IONS-SCNC: 9 MMOL/L (ref 7–15)
AST SERPL W P-5'-P-CCNC: 37 U/L (ref 0–45)
BASE EXCESS BLDV CALC-SCNC: 6.6 MMOL/L (ref -3–3)
BASOPHILS # BLD AUTO: 0 10E3/UL (ref 0–0.2)
BASOPHILS NFR BLD AUTO: 0 %
BILIRUB SERPL-MCNC: 1 MG/DL
BUN SERPL-MCNC: 11.7 MG/DL (ref 8–23)
CALCIUM SERPL-MCNC: 9 MG/DL (ref 8.8–10.2)
CHLORIDE SERPL-SCNC: 98 MMOL/L (ref 98–107)
CREAT SERPL-MCNC: 0.61 MG/DL (ref 0.51–0.95)
D DIMER PPP FEU-MCNC: 1.66 UG/ML FEU (ref 0–0.5)
DEPRECATED HCO3 PLAS-SCNC: 30 MMOL/L (ref 22–29)
EGFRCR SERPLBLD CKD-EPI 2021: 90 ML/MIN/1.73M2
EOSINOPHIL # BLD AUTO: 0 10E3/UL (ref 0–0.7)
EOSINOPHIL NFR BLD AUTO: 0 %
ERYTHROCYTE [DISTWIDTH] IN BLOOD BY AUTOMATED COUNT: 15.9 % (ref 10–15)
FLUAV RNA SPEC QL NAA+PROBE: NEGATIVE
FLUBV RNA RESP QL NAA+PROBE: NEGATIVE
GLUCOSE SERPL-MCNC: 109 MG/DL (ref 70–99)
HCO3 BLDV-SCNC: 32 MMOL/L (ref 21–28)
HCT VFR BLD AUTO: 36.6 % (ref 35–47)
HGB BLD-MCNC: 11.7 G/DL (ref 11.7–15.7)
HOLD SPECIMEN: NORMAL
HOLD SPECIMEN: NORMAL
IMM GRANULOCYTES # BLD: 0 10E3/UL
IMM GRANULOCYTES NFR BLD: 1 %
LYMPHOCYTES # BLD AUTO: 0.6 10E3/UL (ref 0.8–5.3)
LYMPHOCYTES NFR BLD AUTO: 17 %
MCH RBC QN AUTO: 28.8 PG (ref 26.5–33)
MCHC RBC AUTO-ENTMCNC: 32 G/DL (ref 31.5–36.5)
MCV RBC AUTO: 90 FL (ref 78–100)
MONOCYTES # BLD AUTO: 0.4 10E3/UL (ref 0–1.3)
MONOCYTES NFR BLD AUTO: 11 %
NEUTROPHILS # BLD AUTO: 2.4 10E3/UL (ref 1.6–8.3)
NEUTROPHILS NFR BLD AUTO: 71 %
NRBC # BLD AUTO: 0 10E3/UL
NRBC BLD AUTO-RTO: 0 /100
NT-PROBNP SERPL-MCNC: 3164 PG/ML (ref 0–1800)
O2/TOTAL GAS SETTING VFR VENT: 0 %
OXYHGB MFR BLDV: 50 % (ref 70–75)
PCO2 BLDV: 49 MM HG (ref 40–50)
PH BLDV: 7.43 [PH] (ref 7.32–7.43)
PLATELET # BLD AUTO: 131 10E3/UL (ref 150–450)
PO2 BLDV: 29 MM HG (ref 25–47)
POTASSIUM SERPL-SCNC: 3.2 MMOL/L (ref 3.4–5.3)
PROT SERPL-MCNC: 6 G/DL (ref 6.4–8.3)
RBC # BLD AUTO: 4.06 10E6/UL (ref 3.8–5.2)
RSV RNA SPEC NAA+PROBE: NEGATIVE
SAO2 % BLDV: 50.5 % (ref 70–75)
SARS-COV-2 RNA RESP QL NAA+PROBE: NEGATIVE
SODIUM SERPL-SCNC: 137 MMOL/L (ref 135–145)
TROPONIN T SERPL HS-MCNC: 19 NG/L
TROPONIN T SERPL HS-MCNC: 23 NG/L
WBC # BLD AUTO: 3.4 10E3/UL (ref 4–11)

## 2024-05-04 PROCEDURE — 36415 COLL VENOUS BLD VENIPUNCTURE: CPT | Performed by: EMERGENCY MEDICINE

## 2024-05-04 PROCEDURE — 94640 AIRWAY INHALATION TREATMENT: CPT

## 2024-05-04 PROCEDURE — 250N000011 HC RX IP 250 OP 636: Performed by: EMERGENCY MEDICINE

## 2024-05-04 PROCEDURE — 80053 COMPREHEN METABOLIC PANEL: CPT | Performed by: EMERGENCY MEDICINE

## 2024-05-04 PROCEDURE — 93005 ELECTROCARDIOGRAM TRACING: CPT

## 2024-05-04 PROCEDURE — 71275 CT ANGIOGRAPHY CHEST: CPT

## 2024-05-04 PROCEDURE — 83880 ASSAY OF NATRIURETIC PEPTIDE: CPT | Performed by: EMERGENCY MEDICINE

## 2024-05-04 PROCEDURE — 85379 FIBRIN DEGRADATION QUANT: CPT | Performed by: EMERGENCY MEDICINE

## 2024-05-04 PROCEDURE — 250N000013 HC RX MED GY IP 250 OP 250 PS 637: Performed by: EMERGENCY MEDICINE

## 2024-05-04 PROCEDURE — 250N000009 HC RX 250: Performed by: EMERGENCY MEDICINE

## 2024-05-04 PROCEDURE — 84484 ASSAY OF TROPONIN QUANT: CPT | Performed by: EMERGENCY MEDICINE

## 2024-05-04 PROCEDURE — 82805 BLOOD GASES W/O2 SATURATION: CPT | Performed by: EMERGENCY MEDICINE

## 2024-05-04 PROCEDURE — 85025 COMPLETE CBC W/AUTO DIFF WBC: CPT | Performed by: EMERGENCY MEDICINE

## 2024-05-04 PROCEDURE — 71046 X-RAY EXAM CHEST 2 VIEWS: CPT

## 2024-05-04 PROCEDURE — 99285 EMERGENCY DEPT VISIT HI MDM: CPT | Mod: 25

## 2024-05-04 PROCEDURE — 87637 SARSCOV2&INF A&B&RSV AMP PRB: CPT | Performed by: EMERGENCY MEDICINE

## 2024-05-04 RX ORDER — POTASSIUM CHLORIDE 1500 MG/1
20 TABLET, EXTENDED RELEASE ORAL ONCE
Status: COMPLETED | OUTPATIENT
Start: 2024-05-04 | End: 2024-05-04

## 2024-05-04 RX ORDER — ALBUTEROL SULFATE 90 UG/1
2 AEROSOL, METERED RESPIRATORY (INHALATION) EVERY 6 HOURS PRN
Qty: 18 G | Refills: 0 | Status: SHIPPED | OUTPATIENT
Start: 2024-05-04

## 2024-05-04 RX ORDER — IPRATROPIUM BROMIDE AND ALBUTEROL SULFATE 2.5; .5 MG/3ML; MG/3ML
3 SOLUTION RESPIRATORY (INHALATION) ONCE
Status: COMPLETED | OUTPATIENT
Start: 2024-05-04 | End: 2024-05-04

## 2024-05-04 RX ORDER — SPIRONOLACTONE 25 MG/1
25 TABLET ORAL DAILY
Qty: 30 TABLET | Refills: 0 | Status: SHIPPED | OUTPATIENT
Start: 2024-05-04 | End: 2024-05-28

## 2024-05-04 RX ORDER — FUROSEMIDE 10 MG/ML
40 INJECTION INTRAMUSCULAR; INTRAVENOUS ONCE
Status: DISCONTINUED | OUTPATIENT
Start: 2024-05-04 | End: 2024-05-04

## 2024-05-04 RX ORDER — PREDNISONE 20 MG/1
TABLET ORAL
Qty: 10 TABLET | Refills: 0 | Status: SHIPPED | OUTPATIENT
Start: 2024-05-04 | End: 2024-05-16

## 2024-05-04 RX ORDER — IOPAMIDOL 755 MG/ML
500 INJECTION, SOLUTION INTRAVASCULAR ONCE
Status: COMPLETED | OUTPATIENT
Start: 2024-05-04 | End: 2024-05-04

## 2024-05-04 RX ORDER — SPIRONOLACTONE 25 MG/1
25 TABLET ORAL ONCE
Status: COMPLETED | OUTPATIENT
Start: 2024-05-04 | End: 2024-05-04

## 2024-05-04 RX ADMIN — IPRATROPIUM BROMIDE AND ALBUTEROL SULFATE 3 ML: .5; 3 SOLUTION RESPIRATORY (INHALATION) at 12:07

## 2024-05-04 RX ADMIN — IOPAMIDOL 63 ML: 755 INJECTION, SOLUTION INTRAVENOUS at 14:15

## 2024-05-04 RX ADMIN — SPIRONOLACTONE 25 MG: 25 TABLET ORAL at 14:36

## 2024-05-04 RX ADMIN — POTASSIUM CHLORIDE 20 MEQ: 1500 TABLET, EXTENDED RELEASE ORAL at 12:30

## 2024-05-04 ASSESSMENT — ACTIVITIES OF DAILY LIVING (ADL)
ADLS_ACUITY_SCORE: 40

## 2024-05-04 ASSESSMENT — COLUMBIA-SUICIDE SEVERITY RATING SCALE - C-SSRS
2. HAVE YOU ACTUALLY HAD ANY THOUGHTS OF KILLING YOURSELF IN THE PAST MONTH?: NO
1. IN THE PAST MONTH, HAVE YOU WISHED YOU WERE DEAD OR WISHED YOU COULD GO TO SLEEP AND NOT WAKE UP?: NO
6. HAVE YOU EVER DONE ANYTHING, STARTED TO DO ANYTHING, OR PREPARED TO DO ANYTHING TO END YOUR LIFE?: NO

## 2024-05-04 NOTE — ED TRIAGE NOTES
Pt states she has had SOB, wheezing for the past week. Hx of cardiac problems - states she has an enlarged heart. Pt has CHF. Pt here for wheezing, gurgling, coughing. ABCs intact. A&OX4.     Triage Assessment (Adult)       Row Name 05/04/24 1059          Triage Assessment    Airway WDL WDL        Respiratory WDL    Respiratory WDL X        Skin Circulation/Temperature WDL    Skin Circulation/Temperature WDL WDL        Cardiac WDL    Cardiac WDL WDL        Peripheral/Neurovascular WDL    Peripheral Neurovascular WDL WDL        Cognitive/Neuro/Behavioral WDL    Cognitive/Neuro/Behavioral WDL WDL

## 2024-05-04 NOTE — ED PROVIDER NOTES
Patient signed out to me by my colleague, Dr. Pineda.  In brief, the patient is an 78-year-old female with a history of HFpEF who presented with shortness of breath.  She was saw her cardiologist yesterday, was started on Eliquis for new onset atrial fibrillation and also started on carvedilol.  There was a plan to start her on spironolactone as well.  Chest x-ray shows right lower lobe infiltrate versus small pleural effusion.  D-dimer was positive.  Plan at the time of signout is to follow-up on CT PE.  If there is a PE, the patient can continue her Eliquis and also started on prednisone for wheezing.  If there is no PE and evidence of lobar infiltrate, would defer steroids and initiate community-acquired pneumonia coverage.    CT PE shows bronchitis, bronchopneumonia, pulmonary hypertension, manubrial fracture.  Upon reevaluation, the patient denies any central chest pain or tenderness to palpation or pain with movement.  No respiratory distress.  Discussed the CT results.  Since the CT shows bronchitis and bronchopneumonia, will place on both prednisone and Augmentin.  Patient understands her new medications including spironolactone, Augmentin, prednisone.  She will follow-up with her primary care provider, also recommended she ask about the possibility of a metabolic panel about a week from now after initiation of diuretics, though spironolactone is potassium sparing.  She expressed understanding.  She also has follow-up with cardiology later this month.  Patient discharged home.  The patient, her , and her daughter expressed understanding of and agreement with the plan.     Sadiq Morgan MD  05/04/24 [id number]

## 2024-05-04 NOTE — ED PROVIDER NOTES
Emergency Department Note      History of Present Illness     Chief Complaint  Shortness of Breath    HPI  Mercedes Nelson is a 78 year old female who presents to the emergency department 1 week of cough, increasing wheezing and shortness of breath.  She notes she has also had increased lower extremity swelling over the past month.  She denies associated leg pain.  She denies fever or chills.  She denies off colored mucus.  She denies any known sick contacts.  She denies chest pain.  She notes she had an echocardiogram earlier in the week after seeing her primary care doctor and then saw her cardiologist yesterday.  She notes he recommends starting on Eliquis as well as a diuretic and changing blood pressure medication however he only prescribes the Eliquis.  She did take 1 dose this morning.  She notes she did not worsen suddenly this morning although her  states that she did not sleep well last night and seemed to have a lot of difficulty breathing.  The patient denies a history of asthma or COPD.  She is not a smoker.  She notes in the past when she has had respiratory illness she sometimes gets wheezy.    Independent Historian  None    Review of External Notes  I reviewed cardiology visit from yesterday. At that time patient was felt to be in acute on chronic diastolic heart failure with atrial fibrillation.  Spironolactone was initiated and she was switched to carvedilol.  Eliquis was added to her regimen.  She was recommended follow-up in 1 month's time with cardiology.    Echocardiogram 4/30/24  Interpretation Summary     The visual ejection fraction is 50-55%.  There is mod-severe biatrial enlargement.  There is mild to moderate (1-2+) mitral regurgitation.  There is mild to moderate (1-2+) tricuspid regurgitation.  Ascending aorta dilatation is present.  Dilation of the inferior vena cava is present with abnormal respiratory  variation in diameter.  Mild (35-45mmHg) pulmonary hypertension is  "present.  The rhythm was atrial fibrillation.  Compared to prior study, changes are noted.    Past Medical History   Medical History and Problem List  Past Medical History:   Diagnosis Date     Dyslipidemia      Hypertension      Osteoarthritis        Medications  acetaminophen (TYLENOL) 325 MG tablet  apixaban ANTICOAGULANT (ELIQUIS ANTICOAGULANT) 5 MG tablet  B Complex Vitamins (VITAMIN  B COMPLEX) tablet  carvedilol (COREG) 25 MG tablet  clobetasol (TEMOVATE) 0.05 % external ointment  Coenzyme Q10 (COQ-10 PO)  irbesartan (AVAPRO) 75 MG tablet  Multiple Vitamin (MULTI-VITAMIN) per tablet  potassium chloride mary ER (KLOR-CON M10) 10 MEQ CR tablet  prednisoLONE acetate (PRED FORTE) 1 % ophthalmic suspension  rosuvastatin (CRESTOR) 20 MG tablet  senna-docusate (SENOKOT-S/PERICOLACE) 8.6-50 MG tablet  VITAMIN D, CHOLECALCIFEROL, PO        Surgical History   Past Surgical History:   Procedure Laterality Date     CATARACT EXTRACTION, BILATERAL  04/2023     OPEN REDUCTION INTERNAL FIXATION FEMUR DISTAL Right 12/28/2023    Procedure: Open reduction internal fixation, right distal femur fracture with retrograde nail;  Surgeon: Hal Kimball MD;  Location:  OR     Physical Exam   Patient Vitals for the past 24 hrs:   BP Temp Temp src Pulse Resp SpO2 Height Weight   05/04/24 1100 137/85 98.9  F (37.2  C) Temporal 86 20 98 % 1.753 m (5' 9\") 83.3 kg (183 lb 10.3 oz)     Physical Exam    Lab Results   Labs Ordered and Resulted from Time of ED Arrival to Time of ED Departure   COMPREHENSIVE METABOLIC PANEL - Abnormal       Result Value    Sodium 137      Potassium 3.2 (*)     Carbon Dioxide (CO2) 30 (*)     Anion Gap 9      Urea Nitrogen 11.7      Creatinine 0.61      GFR Estimate 90      Calcium 9.0      Chloride 98      Glucose 109 (*)     Alkaline Phosphatase 74      AST 37      ALT 26      Protein Total 6.0 (*)     Albumin 3.9      Bilirubin Total 1.0     TROPONIN T, HIGH SENSITIVITY - Abnormal    Troponin T, High " Sensitivity 23 (*)    BLOOD GAS VENOUS - Abnormal    pH Venous 7.43      pCO2 Venous 49      pO2 Venous 29      Bicarbonate Venous 32 (*)     Base Excess/Deficit Venous 6.6 (*)     FIO2 0      Oxyhemoglobin Venous 50 (*)     O2 Sat, Venous 50.5 (*)    CBC WITH PLATELETS AND DIFFERENTIAL - Abnormal    WBC Count 3.4 (*)     RBC Count 4.06      Hemoglobin 11.7      Hematocrit 36.6      MCV 90      MCH 28.8      MCHC 32.0      RDW 15.9 (*)     Platelet Count 131 (*)     % Neutrophils 71      % Lymphocytes 17      % Monocytes 11      % Eosinophils 0      % Basophils 0      % Immature Granulocytes 1      NRBCs per 100 WBC 0      Absolute Neutrophils 2.4      Absolute Lymphocytes 0.6 (*)     Absolute Monocytes 0.4      Absolute Eosinophils 0.0      Absolute Basophils 0.0      Absolute Immature Granulocytes 0.0      Absolute NRBCs 0.0     NT PROBNP INPATIENT - Abnormal    N terminal Pro BNP Inpatient 3,164 (*)    D DIMER QUANTITATIVE - Abnormal    D-Dimer Quantitative 1.66 (*)    TROPONIN T, HIGH SENSITIVITY - Abnormal    Troponin T, High Sensitivity 19 (*)    INFLUENZA A/B, RSV, & SARS-COV2 PCR - Normal    Influenza A PCR Negative      Influenza B PCR Negative      RSV PCR Negative      SARS CoV2 PCR Negative         Imaging  CT Chest Pulmonary Embolism w Contrast   Final Result   IMPRESSION: pending   Chest XR,  PA & LAT   Final Result   IMPRESSION: Cardiomegaly is similar to previous. The pulmonary vascularity is within normal limits. Mild patchy infiltrate right lung base is new from previous, suspicious for a pneumonia.          EKG     ECG results from 05/04/24   EKG 12-lead, tracing only     Value    Systolic Blood Pressure     Diastolic Blood Pressure     Ventricular Rate 80    Atrial Rate     IL Interval     QRS Duration 100        QTc 472    P Axis     R AXIS -43    T Axis -15    Interpretation ECG      Atrial fibrillation  Left axis deviation  Minimal voltage criteria for LVH, may be normal variant (  Irineo product )  Abnormal ECG  When compared with ECG of 04-DEC-2023 23:10,  Atrial fibrillation has replaced Sinus rhythm         Independent Interpretation  Chest x-ray reviewed and demonstrated possible infiltrate at the right lung base.  Cardiomegaly.  ED Course    Medications Administered  Medications   ipratropium - albuterol 0.5 mg/2.5 mg/3 mL (DUONEB) neb solution 3 mL (3 mLs Nebulization $Given 5/4/24 1207)   potassium chloride mary ER (KLOR-CON M20) CR tablet 20 mEq (20 mEq Oral $Given 5/4/24 1230)   spironolactone (ALDACTONE) tablet 25 mg (25 mg Oral $Given 5/4/24 1436)   iopamidol (ISOVUE-370) solution 500 mL (63 mLs Intravenous $Given 5/4/24 1415)   sodium chloride (PF) 0.9% PF flush 100 mL (83 mLs Intravenous $Given 5/4/24 1415)         Discussion of Management  None    Social Determinants of Health adding to complexity of care  None    ED Course  Past medical records, nursing notes, and vitals reviewed.  1123 I performed an exam of the patient and obtained history, as documented above.   I reassessed the patient.  She notes she is feeling comfortable and denies any new concerns.  She has ambulated without desaturation.  I discussed findings of today's evaluation and she feels comfortable to plan for discharge.  We are awaiting CT chest PE study for final disposition.  Dr. Morgan of the emergency department will follow-up on this study.    Medical Decision Making / Diagnosis       MIPS     CT chest PE study was obtained due to elevated D-dimer.    ALISHA Springerian URMILA Nelson is a 78 year old female with known diastolic heart dysfunction who presents emergency department with increasing shortness of breath, cough and wheezing.  She was seen recent by cardiology and recommended gentle diuresis but it appears as though spironolactone did not end up being prescribed as per the cardiology note.  She is in no significant respiratory distress here.  She does have mild wheezing which did improve with albuterol in  the setting of past wheezing with respiratory illness.  I feel there is a mild component of CHF and possibly an infectious component.  Chest x-ray did show what appears to be an infiltrate.  She just started anticoagulation today and therefore PE was still considered as a possible explanation for her symptoms.  D-dimer was elevated and CT chest is pending on this dictation.  She will likely need treatment for presumed respiratory illness as well as bronchodilator therapy and potentially steroid therapy.  Spironolactone as per cardiology recommendations was also prescribed.  She is recommended to follow-up closely with her primary care provider in the next 1 to 2 days for reassessment.  Return precautions discussed.  All questions answered prior to transfer of care.  38 okay    Disposition  The patient was discharged.     ICD-10 Codes:    ICD-10-CM    1. Acute on chronic diastolic congestive heart failure (H)  I50.33       2. Pulmonary infiltrate  R91.8       3. Atrial fibrillation, unspecified type (H)  I48.91       4. Wheezing  R06.2       5. Bronchopneumonia  J18.0       6. Bronchitis  J40       7. Closed sternal manubrial dissociation, initial encounter  S22.23XA       8. Pulmonary hypertension (H)  I27.20            Discharge Medications  Discharge Medication List as of 5/4/2024  3:30 PM        START taking these medications    Details   albuterol (PROAIR HFA/PROVENTIL HFA/VENTOLIN HFA) 108 (90 Base) MCG/ACT inhaler Inhale 2 puffs into the lungs every 6 hours as needed for shortness of breath, wheezing or cough, Disp-18 g, R-0, E-PrescribePharmacy may dispense brand covered by insurance (Proair, or proventil or ventolin or generic albuterol inhaler)      amoxicillin-clavulanate (AUGMENTIN) 875-125 MG tablet Take 1 tablet by mouth 2 times daily for 7 days, Disp-14 tablet, R-0, E-Prescribe      predniSONE (DELTASONE) 20 MG tablet Take two tablets (= 40mg) each day for 5 (five) days, Disp-10 tablet, R-0,  E-Prescribe      spironolactone (ALDACTONE) 25 MG tablet Take 1 tablet (25 mg) by mouth daily for 30 days, Disp-30 tablet, R-0, E-Prescribe               Bruna Pineda MD    [employer]        Bruna Pineda MD  05/05/24 [id number]

## 2024-05-04 NOTE — DISCHARGE INSTRUCTIONS
Hold your potassium supplements now that you will be taking spironolactone and eliquis as this may increase risk of high potassium.

## 2024-05-04 NOTE — ED NOTES
Cough off and on for the past couple days. 99.5 temp this morning. Hx CHF, was newly diagnosed on Monday 4/29/24. Changed to eliquis yesterday, was originally on aspirin. Has been having swelling bilateral feet off and on but denies weight gain. Denies any sick contact. Broke right femur in Dec 2023.

## 2024-05-04 NOTE — ED NOTES
Per NATALYA Sousa, O2 sats 90-91% on RA with ambulation, minimal increased respiratory effort with exertion. O2 sats increase to the mid 90s on RA when patient lays down.

## 2024-05-05 LAB
ATRIAL RATE - MUSE: NORMAL BPM
DIASTOLIC BLOOD PRESSURE - MUSE: NORMAL MMHG
INTERPRETATION ECG - MUSE: NORMAL
P AXIS - MUSE: NORMAL DEGREES
PR INTERVAL - MUSE: NORMAL MS
QRS DURATION - MUSE: 100 MS
QT - MUSE: 410 MS
QTC - MUSE: 472 MS
R AXIS - MUSE: -43 DEGREES
SYSTOLIC BLOOD PRESSURE - MUSE: NORMAL MMHG
T AXIS - MUSE: -15 DEGREES
VENTRICULAR RATE- MUSE: 80 BPM

## 2024-05-06 DIAGNOSIS — E87.6 HYPOKALEMIA: ICD-10-CM

## 2024-05-06 RX ORDER — POTASSIUM CHLORIDE 750 MG/1
20 TABLET, EXTENDED RELEASE ORAL DAILY
Qty: 180 TABLET | Refills: 4 | Status: SHIPPED | OUTPATIENT
Start: 2024-05-06 | End: 2024-05-17 | Stop reason: ALTCHOICE

## 2024-05-06 NOTE — TELEPHONE ENCOUNTER
Per Dr. Medrano's dictation 5/3/2024:  Of note she has difficult to control hypertension with a markedly low potassium consistently.  I suspect she may be hyper Manuel and we will send off an aldosterone renin ratio.  Then I will start her on spironolactone 25 mg daily.  This will help with heart failure, hypertension and hypokalemia.  She has not been taking her potassium supplement I recommend that she start that again     June clearly has congestive heart failure acute on chronic on a diastolic basis.  I think this is multifactorial partially due to her A-fib.  Partially due to her poorly controlled blood pressure and diastolic dysfunction.  I will start her on spironolactone.  Will get good control of her blood pressure.  I will diurese as able.  Will consider starting Jardiance down the road.        0830 called patient to discuss. She states she was in the ED over the weekend and they started the spironolactone. She has taken 2 doses. Reviewed that Dr. Medrano wanted a specific lab test completed before starting the spironolactone. She will stop the medication for now.    Message to scheduling to add the aldosterone-renin ratio lab to her visit on 5/17/2024 for nuclear study.      ED note 5/4/2024:  CT PE shows bronchitis, bronchopneumonia, pulmonary hypertension, manubrial fracture.  Upon reevaluation, the patient denies any central chest pain or tenderness to palpation or pain with movement.  No respiratory distress.  Discussed the CT results.  Since the CT shows bronchitis and bronchopneumonia, will place on both prednisone and Augmentin.  Patient understands her new medications including spironolactone, Augmentin, prednisone.

## 2024-05-10 ENCOUNTER — TELEPHONE (OUTPATIENT)
Dept: CARDIOLOGY | Facility: CLINIC | Age: 79
End: 2024-05-10
Payer: COMMERCIAL

## 2024-05-10 NOTE — TELEPHONE ENCOUNTER
Spoke with Patient who after the ED visit took 2 days of Spironolactone and then stopped it as she realized Labs were to be drawn prior to starting.   Other meds as listed in EPIC reviewed and taking.      aldosterone renin ratio lab scheduled 5-17-24. Patient may call scheduling to move up lab appointment if possible to early next week.     Next Dr. Marcela DRAKE 5-28-24    ED 5-4-24.   Patient understands her new medications including spironolactone, Augmentin, prednisone.  She will follow-up with her primary care provider, also recommended she ask about the possibility of a metabolic panel about a week from now after initiation of diuretics, though spironolactone is potassium sparing.  She expressed understanding.  She also has follow-up with cardiology later this month.     Last Dr. Medrano OV 5-3-24   Of note she has difficult to control hypertension with a markedly low potassium consistently. I suspect she may be hyper Manuel and we will send off an aldosterone renin ratio. Then I will start her on spironolactone 25 mg daily. This will help with heart failure, hypertension and hypokalemia

## 2024-05-10 NOTE — TELEPHONE ENCOUNTER
Spoke with patient again and will call scheduling now to move up the labs to early next week.     Patient agrees and will call now.     Dr. Medrano's reply -   As my note states start spironolactone as soon as she has had her aldosterone/renin ratio level drawn.  Why is it taking so long to get her lab drawn or has not already been drawn?

## 2024-05-10 NOTE — TELEPHONE ENCOUNTER
Health Call Center    Phone Message    May a detailed message be left on voicemail: yes     Reason for Call: Medication Question or concern regarding medication   Prescription Clarification  Name of Medication: spironolactone (ALDACTONE) 25 MG tablet   Prescribing Provider: Dr. Medrano   Pharmacy:    What on the order needs clarification? Patient is confused on how to take this medication, please call her back to discuss.       Action Taken: Other: cardiology    Travel Screening: Not Applicable   Thank you!  [facility]

## 2024-05-14 ENCOUNTER — LAB (OUTPATIENT)
Dept: LAB | Facility: CLINIC | Age: 79
End: 2024-05-14
Payer: COMMERCIAL

## 2024-05-14 DIAGNOSIS — R09.89 LABILE HYPERTENSION: ICD-10-CM

## 2024-05-14 DIAGNOSIS — I50.33 ACUTE ON CHRONIC DIASTOLIC CONGESTIVE HEART FAILURE (H): ICD-10-CM

## 2024-05-14 DIAGNOSIS — E87.6 HYPOKALEMIA: ICD-10-CM

## 2024-05-14 LAB
ANION GAP SERPL CALCULATED.3IONS-SCNC: 10 MMOL/L (ref 7–15)
BUN SERPL-MCNC: 13.5 MG/DL (ref 8–23)
CALCIUM SERPL-MCNC: 9.5 MG/DL (ref 8.8–10.2)
CHLORIDE SERPL-SCNC: 99 MMOL/L (ref 98–107)
CREAT SERPL-MCNC: 0.69 MG/DL (ref 0.51–0.95)
DEPRECATED HCO3 PLAS-SCNC: 25 MMOL/L (ref 22–29)
EGFRCR SERPLBLD CKD-EPI 2021: 87 ML/MIN/1.73M2
GLUCOSE SERPL-MCNC: 102 MG/DL (ref 70–99)
NT-PROBNP SERPL-MCNC: 1838 PG/ML (ref 0–1800)
POTASSIUM SERPL-SCNC: 3.8 MMOL/L (ref 3.4–5.3)
SODIUM SERPL-SCNC: 134 MMOL/L (ref 135–145)

## 2024-05-14 PROCEDURE — 80048 BASIC METABOLIC PNL TOTAL CA: CPT | Performed by: INTERNAL MEDICINE

## 2024-05-14 PROCEDURE — 82088 ASSAY OF ALDOSTERONE: CPT | Performed by: INTERNAL MEDICINE

## 2024-05-14 PROCEDURE — 36415 COLL VENOUS BLD VENIPUNCTURE: CPT | Performed by: INTERNAL MEDICINE

## 2024-05-14 PROCEDURE — 99000 SPECIMEN HANDLING OFFICE-LAB: CPT | Performed by: INTERNAL MEDICINE

## 2024-05-14 PROCEDURE — 83880 ASSAY OF NATRIURETIC PEPTIDE: CPT | Performed by: INTERNAL MEDICINE

## 2024-05-14 PROCEDURE — 84244 ASSAY OF RENIN: CPT | Mod: 90 | Performed by: INTERNAL MEDICINE

## 2024-05-15 ENCOUNTER — TELEPHONE (OUTPATIENT)
Dept: FAMILY MEDICINE | Facility: CLINIC | Age: 79
End: 2024-05-15
Payer: COMMERCIAL

## 2024-05-15 NOTE — TELEPHONE ENCOUNTER
Do not change   Will figure out tomorrow and change it if needed   We can still discuss her cough  Will make appointment for follow up with provider who is taking new patients  Dr.Nasima Sepideh MD

## 2024-05-15 NOTE — TELEPHONE ENCOUNTER
Patient Contact    Attempt # 1    Was call answered?  Yes. Writer relayed provider's message below, patient expressed verbal understanding.    Cindy Thornton RN  [facility]

## 2024-05-15 NOTE — TELEPHONE ENCOUNTER
Patient has annual appt with doctor Bunn tomorrow 05/16/2024 but wants to discuss ongoing cough and get second opinion. Pt has hx of CHF and pneumonia.  Pt denies acute SOB, wheezing or chest pain during call. Pt was seen at ED 05/04/2024.       ICD-10 Codes:      ICD-10-CM     1. Acute on chronic diastolic congestive heart failure (H)  I50.33         2. Pulmonary infiltrate  R91.8         3. Atrial fibrillation, unspecified type (H)  I48.91         4. Wheezing  R06.2         5. Bronchopneumonia  J18.0         6. Bronchitis  J40         7. Closed sternal manubrial dissociation, initial encounter  S22.23XA         8. Pulmonary hypertension (H)  I27.20                   Because wellness exam may not cover her cough and congestion, pt wants to know if appt type needs to be changed. Should it be changed to ED follow up? Or establish care visit? Pt plans to establish care at [facility]. Doctor Sepideh forrester you be willing to be PCP?

## 2024-05-16 ENCOUNTER — OFFICE VISIT (OUTPATIENT)
Dept: FAMILY MEDICINE | Facility: CLINIC | Age: 79
End: 2024-05-16
Payer: COMMERCIAL

## 2024-05-16 ENCOUNTER — TELEPHONE (OUTPATIENT)
Dept: CARDIOLOGY | Facility: CLINIC | Age: 79
End: 2024-05-16

## 2024-05-16 VITALS
SYSTOLIC BLOOD PRESSURE: 148 MMHG | HEART RATE: 90 BPM | HEIGHT: 69 IN | WEIGHT: 171.5 LBS | OXYGEN SATURATION: 99 % | TEMPERATURE: 98.3 F | BODY MASS INDEX: 25.4 KG/M2 | RESPIRATION RATE: 16 BRPM | DIASTOLIC BLOOD PRESSURE: 80 MMHG

## 2024-05-16 DIAGNOSIS — R93.89 ABNORMAL CHEST CT: ICD-10-CM

## 2024-05-16 DIAGNOSIS — J98.01 BRONCHOSPASM: ICD-10-CM

## 2024-05-16 DIAGNOSIS — I1A.0 RESISTANT HYPERTENSION: ICD-10-CM

## 2024-05-16 DIAGNOSIS — I48.0 PAF (PAROXYSMAL ATRIAL FIBRILLATION) (H): ICD-10-CM

## 2024-05-16 DIAGNOSIS — R06.02 SOB (SHORTNESS OF BREATH): ICD-10-CM

## 2024-05-16 DIAGNOSIS — E87.6 HYPOKALEMIA: ICD-10-CM

## 2024-05-16 DIAGNOSIS — R60.0 PERIPHERAL EDEMA: ICD-10-CM

## 2024-05-16 DIAGNOSIS — J18.9 PNEUMONIA DUE TO INFECTIOUS ORGANISM, UNSPECIFIED LATERALITY, UNSPECIFIED PART OF LUNG: ICD-10-CM

## 2024-05-16 DIAGNOSIS — Z00.00 ENCOUNTER FOR MEDICARE ANNUAL WELLNESS EXAM: Primary | ICD-10-CM

## 2024-05-16 DIAGNOSIS — I50.33 ACUTE ON CHRONIC DIASTOLIC CONGESTIVE HEART FAILURE (H): ICD-10-CM

## 2024-05-16 DIAGNOSIS — I71.21 ANEURYSM OF ASCENDING AORTA WITHOUT RUPTURE (H): ICD-10-CM

## 2024-05-16 DIAGNOSIS — E78.5 DYSLIPIDEMIA: ICD-10-CM

## 2024-05-16 DIAGNOSIS — I50.33 ACUTE ON CHRONIC DIASTOLIC CONGESTIVE HEART FAILURE (H): Primary | ICD-10-CM

## 2024-05-16 PROCEDURE — G0439 PPPS, SUBSEQ VISIT: HCPCS | Performed by: INTERNAL MEDICINE

## 2024-05-16 PROCEDURE — 99214 OFFICE O/P EST MOD 30 MIN: CPT | Mod: 25 | Performed by: INTERNAL MEDICINE

## 2024-05-16 SDOH — HEALTH STABILITY: PHYSICAL HEALTH: ON AVERAGE, HOW MANY DAYS PER WEEK DO YOU ENGAGE IN MODERATE TO STRENUOUS EXERCISE (LIKE A BRISK WALK)?: 3 DAYS

## 2024-05-16 ASSESSMENT — SOCIAL DETERMINANTS OF HEALTH (SDOH): HOW OFTEN DO YOU GET TOGETHER WITH FRIENDS OR RELATIVES?: TWICE A WEEK

## 2024-05-16 ASSESSMENT — PAIN SCALES - GENERAL: PAINLEVEL: NO PAIN (0)

## 2024-05-16 NOTE — PROGRESS NOTES
Preventive Care Visit  [facility]  Christy Bunn MD, Internal Medicine  May 16, 2024      Mercedes was seen today for physical.    She came with her daughter and     Diagnoses and all orders for this visit:    Encounter for Medicare annual wellness exam  Preventive health counseling was also done.  She had mammogram and bone density done via [facility]   It was done at [facility] imaging  We have asked them to send us the report  She is due for all the immunization  She is going to get those once all her symptoms improved    Acute on chronic diastolic congestive heart failure (H)  -   Patient has been followed by cardiology  She has not restarted spironolactone yet  She is a scheduled for stress test tomorrow and will restart after that    PAF (paroxysmal atrial fibrillation) (H)  This is a new diagnosis  Her heart rate is under control  She is going to mail Zio patch monitor results back today  She wore that for 7 days  She is on Eliquis  Educated her about blood thinner    Aneurysm of ascending aorta without rupture (H24)  Follows cardiology    Dyslipidemia  Reordered lipid panel today    Pneumonia due to infectious organism, unspecified laterality, unspecified part of lung  -     XR Chest 2 Views; Future  She is slowly and gradually recovering from her pneumonia and bronchospasm  Per patient she still has a cough and shortness of breath but they are improving  She finished prednisone and Augmentin    Resistant hypertension  Spironolactone would be a good choice   workup for hyperaldosteronism is in progress  Cardiology is managing it  She is going to start spironolactone after her test gets done tomorrow    Bronchospasm  Albuterol inhaler as needed    Abnormal chest CT  Discussed chest CT finding  We need to recheck in 3 months  Will do an x-ray to make sure pneumonia has improved     discussed the importance of getting pneumonia shot and other immunization        Other orders  -      PRIMARY CARE FOLLOW-UP SCHEDULING; Future       Accompanied by  and daughter today.    Last colonoscopy 11/2018  Last DEXA 2/2021 completed at [facility] imaging  Last mammo 12/2022 was nl, asked patient to have [facility] fax PCP records.    Discussed 5/4/2024 CT Chest scan results.   Reminded she's due for a repeat scan in 3 months.    Discussed EKG feature on Apple Watch.    Daughter inquired about pulmonary HTN, scheduled to see pulmonology, Dr. Smart at [facility]    Finished Zio patch last night 5/15, mailing it today.    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    Follows Dr. Medrano.    Patient declined vaccines today d/t cocurrent illness and b/c she's scheduled for stress test.    Was recently seen in ER for cough, wheezing an SOB.    BP elevated at 151/100, rechecked at . Monitors BP at home and reported readings fluctuate.  Discussed factors that can elevate BP.  Denied SILVER and snoring.    Staring spirolactone after completing stress test tomorrow 5/17    Finished prednisone course.  Finished Augmentin course.    Advised to take fall precautions including using a cane if needed.    Marcelino Long is a 78 year old, presenting for the following:  Physical          Health Care Directive  Patient does not have a Health Care Directive or Living Will: Discussed advance care planning with patient; however, patient declined at this time.    HPI  Mercedes is a 78 year old, presenting for the following:  Physical        5/16/2024   General Health   How would you rate your overall physical health? Good   Feel stress (tense, anxious, or unable to sleep) Not at all         5/16/2024   Nutrition   Diet: Low salt         5/16/2024   Exercise   Days per week of moderate/strenous exercise 3 days         5/16/2024   Social Factors   Frequency of gathering with friends or relatives Twice a week   Worry food won't last until get money to buy more No   Food not last or not have  enough money for food? No   Do you have housing?  No   Are you worried about losing your housing? No   Lack of transportation? No   Unable to get utilities (heat,electricity)? No   Want help with housing or utility concern? No   (!) HOUSING CONCERN PRESENT      5/16/2024   Fall Risk   Fallen 2 or more times in the past year? No   Trouble with walking or balance? No          5/16/2024   Activities of Daily Living- Home Safety   Needs help with the following daily activites None of the above   Safety concerns in the home None of the above         5/16/2024   Dental   Dentist two times every year? Yes         5/16/2024   Hearing Screening   Hearing concerns? (!) I NEED TO ASK PEOPLE TO SPEAK UP OR REPEAT THEMSELVES.    (!) TROUBLE UNDERSTANDING SOFT OR WHISPERED SPEECH.         5/16/2024   Driving Risk Screening   Patient/family members have concerns about driving No         5/16/2024   General Alertness/Fatigue Screening   Have you been more tired than usual lately? No         5/16/2024   Urinary Incontinence Screening   Bothered by leaking urine in past 6 months No         5/16/2024   TB Screening   Were you born outside of the [place]? No         Today's PHQ-2 Score:       5/16/2024     2:13 PM   PHQ-2 ( 1999 Pfizer)   Q1: Little interest or pleasure in doing things 0   Q2: Feeling down, depressed or hopeless 0   PHQ-2 Score 0   Q1: Little interest or pleasure in doing things Not at all   Q2: Feeling down, depressed or hopeless Not at all   PHQ-2 Score 0           5/16/2024   Substance Use   Alcohol more than 3/day or more than 7/wk Not Applicable   Do you have a current opioid prescription? No   How severe/bad is pain from 1 to 10? 0/10 (No Pain)   Do you use any other substances recreationally? No     Social History     Tobacco Use    Smoking status: Never    Smokeless tobacco: Never   Substance Use Topics    Alcohol use: No          Mammogram Screening - Mammogram every 1-2 years updated in Health Maintenance based on  "mutual decision making        Reviewed and updated as needed this visit by Provider                  Current providers sharing in care for this patient include:  Patient Care Team:  Christy Bunn MD as PCP - General (Internal Medicine)  Seferino Muhammad MD as Assigned Heart and Vascular Provider  Honey Jones DNP as Assigned PCP    The following health maintenance items are reviewed in Epic and correct as of today:  Health Maintenance   Topic Date Due    HF ACTION PLAN  Never done    Pneumococcal Vaccine: 65+ Years (1 of 2 - PCV) Never done    DTAP/TDAP/TD IMMUNIZATION (1 - Tdap) Never done    ZOSTER IMMUNIZATION (1 of 2) Never done    RSV VACCINE (Pregnancy & 60+) (1 - 1-dose 60+ series) Never done    COVID-19 Vaccine (3 - 2023-24 season) 09/01/2023    LIPID  02/13/2024    INFLUENZA VACCINE (Season Ended) 09/01/2024    BMP  11/14/2024    ANNUAL REVIEW OF HM ORDERS  12/01/2024    ALT  05/04/2025    CBC  05/04/2025    MEDICARE ANNUAL WELLNESS VISIT  05/16/2025    FALL RISK ASSESSMENT  05/16/2025    GLUCOSE  05/14/2027    COLORECTAL CANCER SCREENING  11/15/2028    ADVANCE CARE PLANNING  05/16/2029    DEXA  02/25/2036    TSH W/FREE T4 REFLEX  Completed    PHQ-2 (once per calendar year)  Completed    IPV IMMUNIZATION  Aged Out    HPV IMMUNIZATION  Aged Out    MENINGITIS IMMUNIZATION  Aged Out    RSV MONOCLONAL ANTIBODY  Aged Out       Review of Systems  Constitutional, HEENT, cardiovascular, pulmonary, GI, , musculoskeletal, neuro, skin, endocrine and psych systems are negative, except as otherwise noted.     Objective    Exam  BP (!) 148/80 (BP Location: Right arm, Patient Position: Sitting)   Pulse 90   Temp 98.3  F (36.8  C) (Tympanic)   Resp 16   Ht 1.753 m (5' 9\")   Wt 77.8 kg (171 lb 8 oz)   SpO2 99%   BMI 25.33 kg/m     Estimated body mass index is 25.33 kg/m  as calculated from the following:    Height as of this encounter: 1.753 m (5' 9\").    Weight as of this encounter: 77.8 kg (171 lb 8 " oz).    Physical Exam  GENERAL: alert and no distress  EYES: Eyes grossly normal to inspection, PERRL and conjunctivae and sclerae normal  HENT: ear canals and TM's normal, nose and mouth without ulcers or lesions  NECK: no adenopathy, no asymmetry, masses, or scars  RESP: lungs clear to auscultation - no rales, rhonchi or wheezes coughing  CV: irregular beat and regular rhythm, normal S1 S2, no S3 or S4, no murmur, click or rub, no peripheral edema   ABDOMEN: soft, nontender, no hepatosplenomegaly, no masses and bowel sounds normal  MS: no gross musculoskeletal defects noted, edema in bilateral lower extremities  SKIN: no suspicious lesions or rashes  NEURO: Normal strength and tone, mentation intact and speech normal  PSYCH: mentation appears normal, affect normal/bright  LYMPH: no cervical, supraclavicular, axillary, or inguinal adenopathy    Labs pending         5/16/2024   Mini Cog   Clock Draw Score 2 Normal   3 Item Recall 3 objects recalled   Mini Cog Total Score 5            Signed Electronically by: Christy Bunn MD    This document serves as a record of the services and decisions personally performed and made by Dr. Bunn. It was created on her behalf by Julia Martines, a trained medical scribe. The creation of this document is based the provider's statements to the medical scribe.

## 2024-05-16 NOTE — PATIENT INSTRUCTIONS
"There is a new shingles vaccine available called shingrex  It is a series of 2 shots 2-6 months apart.  Considered more than 90% effective.  Please go to any pharmacy to get the  vaccine    Respiratory syncytial virus (RSV) is an important cause of lower respiratory tract disease in older adults.   In 2023, the [place] Food and Drug Administration approved two recombinant RSV vaccines for the prevention of lower respiratory tract disease in individuals 60 years of age and older [1,2].     this vaccine prevented RSV-related respiratory infection and lower respiratory tract disease in adults age 60 years and older who received one dose of an AS01E-adjuvanted RSV Prefusion F Protein Vaccine [51].     This infection may cause serious infection like pneumonia in older patients     So it is good idea to get this RSV vaccine from any local pharmacy .    You are due for tdap    You are due for Covid booster and PCV 20    PCV 20 is very important       You need CXR in 3 months     Follow up in 3 months.  Seek sooner medical attention if there is any worsening of symptoms or problems.   Preventive Care Advice   This is general advice we often give to help people stay healthy. Your care team may have specific advice just for you. Please talk to your care team about your own preventive care needs.  Lifestyle  Exercise at least 150 minutes each week (30 minutes a day, 5 days a week).  Do muscle strengthening activities 2 days a week. These help control your weight and prevent disease.  No smoking.  Wear sunscreen to prevent skin cancer.  Have your home tested for radon every 2 to 5 years. Radon is a colorless, odorless gas that can harm your lungs. To learn more, go to www.health.[facility].[place].us and search for \"Radon in Homes.\"  Keep guns unloaded and locked up in a safe place like a safe or gun vault, or, use a gun lock and hide the keys. Always lock away bullets separately. To learn more, visit [facility].mn.gov and search for \"safe gun " "storage.\"  Nutrition  Eat 5 or more servings of fruits and vegetables each day.  Try wheat bread, brown rice and whole grain pasta (instead of white bread, rice, and pasta).  Get enough calcium and vitamin D. Check the label on foods and aim for 100% of the RDA (recommended daily allowance).  Regular exams  Have a dental exam and cleaning every 6 months.  See your health care team every year to talk about:  Any changes in your health.  Any medicines your care team has prescribed.  Preventive care, family planning, and ways to prevent chronic diseases.  Shots (vaccines)   HPV shots (up to age 26), if you've never had them before.  Hepatitis B shots (up to age 59), if you've never had them before.  COVID-19 shot: Get this shot when it's due.  Flu shot: Get a flu shot every year.  Tetanus shot: Get a tetanus shot every 10 years.  Pneumococcal, hepatitis A, and RSV shots: Ask your care team if you need these based on your risk.  Shingles shot (for age 50 and up).  General health tests  Diabetes screening:  Starting at age 35, Get screened for diabetes at least every 3 years.  If you are younger than age 35, ask your care team if you should be screened for diabetes.  Cholesterol test: At age 39, start having a cholesterol test every 5 years, or more often if advised.  Bone density scan (DEXA): At age 50, ask your care team if you should have this scan for osteoporosis (brittle bones).  Hepatitis C: Get tested at least once in your life.  Abdominal aortic aneurysm screening: Talk to your doctor about having this screening if you:  Have ever smoked; and  Are biologically male; and  Are between the ages of 65 and 75.  STIs (sexually transmitted infections)  Before age 24: Ask your care team if you should be screened for STIs.  After age 24: Get screened for STIs if you're at risk. You are at risk for STIs (including HIV) if:  You are sexually active with more than one person.  You don't use condoms every time.  You or a " partner was diagnosed with a sexually transmitted infection.  If you are at risk for HIV, ask about PrEP medicine to prevent HIV.  Get tested for HIV at least once in your life, whether you are at risk for HIV or not.  Cancer screening tests  Cervical cancer screening: If you have a cervix, begin getting regular cervical cancer screening tests at age 21. Most people who have regular screenings with normal results can stop after age 65. Talk about this with your provider.  Breast cancer scan (mammogram): If you've ever had breasts, begin having regular mammograms starting at age 40. This is a scan to check for breast cancer.  Colon cancer screening: It is important to start screening for colon cancer at age 45.  Have a colonoscopy test every 10 years (or more often if you're at risk) Or, ask your provider about stool tests like a FIT test every year or Cologuard test every 3 years.  To learn more about your testing options, visit: www.[email or web address]/696436.pdf.  For help making a decision, visit: bruno/[email or web address].  Prostate cancer screening test: If you have a prostate and are age 55 to 69, ask your provider if you would benefit from a yearly prostate cancer screening test.  Lung cancer screening: If you are a current or former smoker age 50 to 80, ask your care team if ongoing lung cancer screenings are right for you.  For informational purposes only. Not to replace the advice of your health care provider. Copyright   2023 [facility]. All rights reserved. Clinically reviewed by the [facility] Transitions Program. Inter[employer] Me [id number] - REV 04/24.    Hearing Loss: Care Instructions  Overview     Hearing loss is a sudden or slow decrease in how well you hear. It can range from slight to profound. Permanent hearing loss can occur with aging. It also can happen when you are exposed long-term to loud noise. Examples include listening to loud music, riding motorcycles, or being around other loud  machines.  Hearing loss can affect your work and home life. It can make you feel lonely or depressed. You may feel that you have lost your independence. But hearing aids and other devices can help you hear better and feel connected to others.  Follow-up care is a key part of your treatment and safety. Be sure to make and go to all appointments, and call your doctor if you are having problems. It's also a good idea to know your test results and keep a list of the medicines you take.  How can you care for yourself at home?  Avoid loud noises whenever possible. This helps keep your hearing from getting worse.  Always wear hearing protection around loud noises.  Wear a hearing aid as directed.  A professional can help you pick a hearing aid that will work best for you.  You can also get hearing aids over the counter for mild to moderate hearing loss.  Have hearing tests as your doctor suggests. They can show whether your hearing has changed. Your hearing aid may need to be adjusted.  Use other devices as needed. These may include:  Telephone amplifiers and hearing aids that can connect to a television, stereo, radio, or microphone.  Devices that use lights or vibrations. These alert you to the doorbell, a ringing telephone, or a baby monitor.  Television closed-captioning. This shows the words at the bottom of the screen. Most new TVs can do this.  TTY (text telephone). This lets you type messages back and forth on the telephone instead of talking or listening. These devices are also called TDD. When messages are typed on the keyboard, they are sent over the phone line to a receiving TTY. The message is shown on a monitor.  Use text messaging, social media, and email if it is hard for you to communicate by telephone.  Try to learn a listening technique called speechreading. It is not lipreading. You pay attention to people's gestures, expressions, posture, and tone of voice. These clues can help you understand what a  "person is saying. Face the person you are talking to, and have them face you. Make sure the lighting is good. You need to see the other person's face clearly.  Think about counseling if you need help to adjust to your hearing loss.  When should you call for help?  Watch closely for changes in your health, and be sure to contact your doctor if:    You think your hearing is getting worse.     You have new symptoms, such as dizziness or nausea.   Where can you learn more?  Go to https://www.Navut.net/patiented  Enter R798 in the search box to learn more about \"Hearing Loss: Care Instructions.\"  Current as of: September 27, 2023               Content Version: 14.0    8213-6649 [employer].   Care instructions adapted under license by your healthcare professional. If you have questions about a medical condition or this instruction, always ask your healthcare professional. [employer] disclaims any warranty or liability for your use of this information.      "

## 2024-05-16 NOTE — TELEPHONE ENCOUNTER
Message from Dr. Medrano re: lab results  Talat Medrano MD  P [facility] Heart Team 2  Potassium is improved.  N-terminal proBNP is improved.  Mild hyponatremia probably secondary to spironolactone.  We can discontinue potassium.  ==============================    The aldosterone/renin ratio lab is pending.  The patient waited to start spironolactone until after her labs were done yesterday    The bmp and BNP were planned for 5/28/2024 for 1 month check. These were drawn with the aldosterone/renin ratio yesterday.    Will clarify with Dr. Medrano if repeat labs are needed.    Awaiting plan before calling the patient to stop Kcl for now

## 2024-05-16 NOTE — COMMUNITY RESOURCES LIST (ENGLISH)
May 16, 2024           YOUR PERSONALIZED LIST OF SERVICES & PROGRAMS           & SHELTER    Housing      [address] [place] [address] - Violence Prevention Services - Domestic Violence Shelter  [place] [address] (Distance: 4.6 miles)  Phone: [phone]) [phone]  Website: https://[address] [place], [place] [address]Eat Club.org/  Language: English      Action Partnership (CAP) [facility] - Shelter for individuals  [address] [place] [address] (Distance: 4.2 miles)  Language: English, Japanese  Fee: Free      Home Health Care [facility] - [facility] [facility]  Phone: [phone]  Website: https://www.[employer] Group[facility].[employer]/  Language: English, Hmong, Oromo, Tristanian, Japanese  Hours: Mon 9:00 AM - 5:00 PM Tue 9:00 AM - 5:00 PM Wed 9:00 AM - 5:00 PM Thu 9:00 AM - 5:00 PM Fri 9:00 AM - 5:00 PM  Fee: Insurance  Accessibility: Blind accommodation, Deaf or hard of hearing, Translation services  Transportation Options: Free transportation    Case Management      [facility] - Housing search assistance  [address] [place] [address] (Distance: 10.8 miles)  Phone: [phone]) [phone]  Language: English  Fee: Free, Sliding scale, Insurance  Accessibility: Translation services, Ada accessible, Blind accommodation, Deaf or hard of hearing      [employer] - Housing search assistance  [address] [place] [address] [address] [facility] [address] [place] [address] (Distance: 4.1 miles)  Phone: [phone]) [phone]  Language: English  Fee: Sliding scale, Self pay      [employer] - Housing Stabilization Services  Phone: (928) 321-5534  Website: [email or web address]/  Language: English  Hours: Mon 8:00 AM - 4:00 PM Tue 8:00 AM - 4:00 PM Wed 8:00 AM - 4:00 PM Thu 8:00 AM - 4:00 PM Fri 8:00 AM - 4:00 PM  Fee: Free  Accessibility: Blind accommodation, Deaf or hard of hearing  Transportation Options: Free transportation    Drop-In Services      [employer] - [employer]  [address] [place] [address] 5 [place] [address] (Distance: 16.1 miles)  Phone: (273) [phone]   Language: English  Fee: Free      [employer] [facility] - Elder Independent Living Program  [address] [place] [address] (Distance: 8.2 miles)  Website: https://www.amn.info/elders  Language: English      [employer]OVE - LAUNDRY LOVE  Website: http://www.laundrylove.org               IMPORTANT NUMBERS & WEBSITES        Emergency Services  911  .   United Way  211 http://211unitedway.org  .   Poison Control  (570) 969-1437 http://[place]poison.org http://wisconsinpoison.org  .     Suicide and Crisis Lifeline  988 http://988Anzuline.org  .   Childhelp [place] Child Abuse Hotline  828.323.8209 http://Childhelphotline.org   .   National Sexual Assault Hotline  (554) 298-6275 (HOPE) http://Wooboard.com.org   .     [place] Runaway Safeline  (185) 304-3219 (RUNAWAY) http://Secure MentemruFeesheh.org  .   Pregnancy & Postpartum Support  Call/text 107-522-7781  [place]: http://ppsupportmn.org  [place]: http://Tagboard.com/wi  .   Substance Abuse National Helpline ([facility])  458-798-HELP (4817) http://Findtreatment.gov   .                DISCLAIMER: These resources have been generated via the [employer] Platform. [employer] does not endorse any service providers mentioned in this resource list. [employer] does not guarantee that the services mentioned in this resource list will be available to you or will improve your health or wellness.    [facility]

## 2024-05-17 ENCOUNTER — TELEPHONE (OUTPATIENT)
Dept: CARDIOLOGY | Facility: CLINIC | Age: 79
End: 2024-05-17

## 2024-05-17 ENCOUNTER — HOSPITAL ENCOUNTER (OUTPATIENT)
Dept: NUCLEAR MEDICINE | Facility: CLINIC | Age: 79
Setting detail: NUCLEAR MEDICINE
Discharge: HOME | End: 2024-05-17
Attending: INTERNAL MEDICINE
Payer: COMMERCIAL

## 2024-05-17 ENCOUNTER — HOSPITAL ENCOUNTER (OUTPATIENT)
Dept: CARDIOLOGY | Facility: CLINIC | Age: 79
Discharge: HOME | End: 2024-05-17
Attending: INTERNAL MEDICINE
Payer: COMMERCIAL

## 2024-05-17 DIAGNOSIS — I50.33 ACUTE ON CHRONIC DIASTOLIC CONGESTIVE HEART FAILURE (H): ICD-10-CM

## 2024-05-17 DIAGNOSIS — E26.9 HYPERALDOSTERONISM (H): Primary | ICD-10-CM

## 2024-05-17 DIAGNOSIS — R06.02 SOB (SHORTNESS OF BREATH): ICD-10-CM

## 2024-05-17 DIAGNOSIS — I48.0 PAF (PAROXYSMAL ATRIAL FIBRILLATION) (H): ICD-10-CM

## 2024-05-17 LAB
ALDOST SERPL-MCNC: 13.1 NG/DL (ref 0–31)
ALDOST/RENIN PLAS-RTO: 43.7 {RATIO} (ref 0–25)
CV STRESS MAX HR HE: 95
RATE PRESSURE PRODUCT: NORMAL
RENIN PLAS-CCNC: 0.3 NG/ML/HR
STRESS ECHO BASELINE DIASTOLIC HE: 105
STRESS ECHO BASELINE HR: 82 BPM
STRESS ECHO BASELINE SYSTOLIC BP: 170
STRESS ECHO CALCULATED PERCENT HR: 68 %
STRESS ECHO LAST STRESS DIASTOLIC BP: 77
STRESS ECHO LAST STRESS SYSTOLIC BP: 144
STRESS ECHO TARGET HR: 140

## 2024-05-17 PROCEDURE — 343N000001 HC RX 343: Performed by: INTERNAL MEDICINE

## 2024-05-17 PROCEDURE — 93016 CV STRESS TEST SUPVJ ONLY: CPT | Performed by: INTERNAL MEDICINE

## 2024-05-17 PROCEDURE — 250N000011 HC RX IP 250 OP 636: Mod: JZ | Performed by: INTERNAL MEDICINE

## 2024-05-17 PROCEDURE — 93017 CV STRESS TEST TRACING ONLY: CPT

## 2024-05-17 PROCEDURE — 78452 HT MUSCLE IMAGE SPECT MULT: CPT

## 2024-05-17 PROCEDURE — A9500 TC99M SESTAMIBI: HCPCS | Performed by: INTERNAL MEDICINE

## 2024-05-17 PROCEDURE — 93018 CV STRESS TEST I&R ONLY: CPT | Performed by: INTERNAL MEDICINE

## 2024-05-17 PROCEDURE — 78452 HT MUSCLE IMAGE SPECT MULT: CPT | Mod: 26 | Performed by: INTERNAL MEDICINE

## 2024-05-17 RX ORDER — CAFFEINE CITRATE 20 MG/ML
60 SOLUTION INTRAVENOUS
Status: DISCONTINUED | OUTPATIENT
Start: 2024-05-17 | End: 2024-05-18 | Stop reason: HOSPADM

## 2024-05-17 RX ORDER — ACYCLOVIR 200 MG/1
0-1 CAPSULE ORAL
Status: DISCONTINUED | OUTPATIENT
Start: 2024-05-17 | End: 2024-05-18 | Stop reason: HOSPADM

## 2024-05-17 RX ORDER — ALBUTEROL SULFATE 90 UG/1
2 AEROSOL, METERED RESPIRATORY (INHALATION) EVERY 5 MIN PRN
Status: DISCONTINUED | OUTPATIENT
Start: 2024-05-17 | End: 2024-05-18 | Stop reason: HOSPADM

## 2024-05-17 RX ORDER — AMINOPHYLLINE 25 MG/ML
50-100 INJECTION, SOLUTION INTRAVENOUS
Status: DISCONTINUED | OUTPATIENT
Start: 2024-05-17 | End: 2024-05-18 | Stop reason: HOSPADM

## 2024-05-17 RX ORDER — REGADENOSON 0.08 MG/ML
INJECTION, SOLUTION INTRAVENOUS
Status: COMPLETED
Start: 2024-05-17 | End: 2024-05-17

## 2024-05-17 RX ORDER — REGADENOSON 0.08 MG/ML
0.4 INJECTION, SOLUTION INTRAVENOUS ONCE
Status: COMPLETED | OUTPATIENT
Start: 2024-05-17 | End: 2024-05-17

## 2024-05-17 RX ADMIN — REGADENOSON 0.4 MG: 0.08 INJECTION, SOLUTION INTRAVENOUS at 08:59

## 2024-05-17 RX ADMIN — Medication 10.2 MILLICURIE: at 07:48

## 2024-05-17 RX ADMIN — Medication 33 MILLICURIE: at 09:04

## 2024-05-17 NOTE — TELEPHONE ENCOUNTER
Lab results for aldosterone renin ratio completed 5/17/2024.    Patient started spironolactone after the stress test today  Bmp and BNP to be checked 5/28/2024  Patient to see Dr. Medrano on 5/28/2024    Ziopatch mailed back to [facility] - pending    Component      Latest Ref Rng 5/14/2024  9:06 AM   Aldosterone      0.0 - 31.0 ng/dL 13.1    Renin Activity      ng/mL/hr 0.3    Aldosterone Renin Ratio      0.0 - 25.0  43.7 (H)       Nuclear study completed.     The nuclear stress test is negative for inducible myocardial ischemia or infarction.    Left ventricular function is mildly reduced, EF 48%.      Patient saw PCP yesterday - no changes made - recovering from pneumonia    CT chest pulmonary 5/4/2024:  1.  Patient has multiple findings as enumerated below.   2.  Patient has evidence of an acute bronchitis with a bronchopneumonia mainly in the right lower lobe and to a lesser extent in the inferior right middle lobe.  3.  Cardiomegaly with mild changes of underlying failure as noted above.  4.  Main pulmonary artery is enlarged at 3.6 cm suggestive of pulmonary arterial hypertension.  5.  Subacute, slightly depressed and comminuted manubrial fracture.    Will message Dr. Medrano to review

## 2024-05-17 NOTE — TELEPHONE ENCOUNTER
Talat Medrano MD Anderson, Tiffany SOSA, RN15 hours ago (5:35 PM)     Yes.  Thank you       Attempted to reach patient, left message to call back. Orders placed for repeat BMP/BNP for 5/28.       Addendum 1100: Spoke to patient, reviewed labs and message fro Dr Medrano. She will stop her potassium and start spironolactone at this time. Stress test is in process, reviewed that nursing will call with any abnormal/urgent results, otherwise report will be reviewed with her at her upcoming follow up.

## 2024-05-20 NOTE — TELEPHONE ENCOUNTER
Reply from Dr. Medrano:  Referred to endocrinology.  I assume she has started on spironolactone.  She should follow-up with a BP check and a repeat BMP in 3 to 6 weeks with an FRANKLIN or myself     Patient to see Dr. Medrano on 5/28/2024 with bmp and BNP same day.    Order placed for Endocrinology referral.    1205 spoke with patient, she is having a bit of edema, will do leg elevations. She did start the spironolactone. She was placed that her stress test was looking stable. She will await a call from endocrinology team.

## 2024-05-23 ENCOUNTER — MYC MEDICAL ADVICE (OUTPATIENT)
Dept: FAMILY MEDICINE | Facility: CLINIC | Age: 79
End: 2024-05-23
Payer: COMMERCIAL

## 2024-05-23 PROCEDURE — 93248 EXT ECG>7D<15D REV&INTERPJ: CPT | Performed by: INTERNAL MEDICINE

## 2024-05-28 ENCOUNTER — OFFICE VISIT (OUTPATIENT)
Dept: CARDIOLOGY | Facility: CLINIC | Age: 79
End: 2024-05-28
Attending: INTERNAL MEDICINE
Payer: COMMERCIAL

## 2024-05-28 ENCOUNTER — LAB (OUTPATIENT)
Dept: LAB | Facility: CLINIC | Age: 79
End: 2024-05-28
Payer: COMMERCIAL

## 2024-05-28 VITALS
HEIGHT: 70 IN | WEIGHT: 169 LBS | DIASTOLIC BLOOD PRESSURE: 78 MMHG | SYSTOLIC BLOOD PRESSURE: 148 MMHG | BODY MASS INDEX: 24.2 KG/M2 | HEART RATE: 100 BPM

## 2024-05-28 DIAGNOSIS — I50.33 ACUTE ON CHRONIC DIASTOLIC CONGESTIVE HEART FAILURE (H): ICD-10-CM

## 2024-05-28 DIAGNOSIS — R06.02 SOB (SHORTNESS OF BREATH): ICD-10-CM

## 2024-05-28 DIAGNOSIS — I48.0 PAF (PAROXYSMAL ATRIAL FIBRILLATION) (H): ICD-10-CM

## 2024-05-28 DIAGNOSIS — R60.0 PERIPHERAL EDEMA: ICD-10-CM

## 2024-05-28 DIAGNOSIS — E87.6 HYPOKALEMIA: ICD-10-CM

## 2024-05-28 DIAGNOSIS — R09.89 LABILE HYPERTENSION: ICD-10-CM

## 2024-05-28 LAB
ANION GAP SERPL CALCULATED.3IONS-SCNC: 13 MMOL/L (ref 7–15)
BUN SERPL-MCNC: 12.9 MG/DL (ref 8–23)
CALCIUM SERPL-MCNC: 9.4 MG/DL (ref 8.8–10.2)
CHLORIDE SERPL-SCNC: 100 MMOL/L (ref 98–107)
CREAT SERPL-MCNC: 0.65 MG/DL (ref 0.51–0.95)
DEPRECATED HCO3 PLAS-SCNC: 25 MMOL/L (ref 22–29)
EGFRCR SERPLBLD CKD-EPI 2021: 89 ML/MIN/1.73M2
GLUCOSE SERPL-MCNC: 94 MG/DL (ref 70–99)
NT-PROBNP SERPL-MCNC: 2145 PG/ML (ref 0–1800)
POTASSIUM SERPL-SCNC: 3.6 MMOL/L (ref 3.4–5.3)
SODIUM SERPL-SCNC: 138 MMOL/L (ref 135–145)

## 2024-05-28 PROCEDURE — 36415 COLL VENOUS BLD VENIPUNCTURE: CPT

## 2024-05-28 PROCEDURE — 83880 ASSAY OF NATRIURETIC PEPTIDE: CPT

## 2024-05-28 PROCEDURE — 99417 PROLNG OP E/M EACH 15 MIN: CPT | Performed by: INTERNAL MEDICINE

## 2024-05-28 PROCEDURE — 80048 BASIC METABOLIC PNL TOTAL CA: CPT

## 2024-05-28 PROCEDURE — 99215 OFFICE O/P EST HI 40 MIN: CPT | Performed by: INTERNAL MEDICINE

## 2024-05-28 PROCEDURE — G2211 COMPLEX E/M VISIT ADD ON: HCPCS | Performed by: INTERNAL MEDICINE

## 2024-05-28 RX ORDER — SPIRONOLACTONE 50 MG/1
50 TABLET, FILM COATED ORAL DAILY
Qty: 90 TABLET | Refills: 4 | Status: SHIPPED | OUTPATIENT
Start: 2024-05-28

## 2024-05-28 NOTE — LETTER
5/28/2024    Christy Bunn MD  [address]  [facility] [address]    RE: Mercedes Nelson       Dear Colleague,     I had the pleasure of seeing Mercedes Nelson in the [facility].  HPI and Plan:   Mercedes is a very nice 78-year-old woman who follows with my partner Dr. Best Muhammad.  I met her urgently in May because of newly diagnosed atrial fibrillation.     She has longstanding labile hypertension, hypercholesterolemia, hypokalemia, dependent edema.  In December she was seen in the ER for her labile hypertension.  Later in December she again was seen after mechanical fall resulting in a fracture of her right femur.  On all these visits and previous event monitoring she is not been found to have A-fib although there was suspicion that she may have it.     This spring she noted she had increased cough.  She has worsening peripheral edema left greater than right and PND.     She was seen in her primary care office where chest x-ray demonstrated enlarged cardiac silhouette.  Subsequent echocardiogram noted atrial fibrillation.  She is completely unaware of her atrial fibrillation.     Family history is significant for mother dying at age 94.  Her father had coronary disease at age 63.  She is a lifelong non-smoker.  She does not drink alcohol.  She does not appear to have diabetes although some sugars are elevated possibly impaired fasting glucose versus nonfasting blood draws.     She denies exertional chest arm neck jaw or shoulder discomfort.  She has no lightheadedness dizziness syncope or near syncope.  She states she may be aware of her rhythm when she lies down at nighttime.  Her chief complaint is her peripheral edema.    An April echocardiogram demonstrates ejection fraction of 50 to 55% with moderate concentric left ventricular hypertrophy she has mild to moderate mitral regurgitation and tricuspid regurgitation.  Ascending aorta is borderline dilated at 3.8 cm.   IVC is described as dilated with decreased respiratory variation.  Pulmonary pressures are estimated to be 19 mmHg plus right atrial pressure     Review of the chart shows normal CBC.  She had an N-terminal proBNP of 3644.  Glucose is elevated at 126.  Most recent potassium is 3.3 EKG demonstrates atrial fibrillation with a rate of 85 with left anterior fascicular block.    Workup to date includes a Lexiscan which appears to be negative for ischemia.  A 7-day Zio patch showing average heart rate of 88 ranging from .  I also checked an aldosterone renin ratio that came back at 44.  An endocrinology consult has been placed with an appointment for December.    She returns to clinic and states she is still feeling quite well.  Her chief complaint is still her peripheral edema.  She has no symptoms to suggest a TIA or CVA.  She is having no bleeding problems on Eliquis therapy    Review of the chart shows an ER visit for pneumonia but I suspect also component of heart failure.     Assessment and plan.  Olga has no symptoms to suggest ischemia and this is supported by her Lexiscan    Home blood pressures are very well-controlled.  It is high today in the office.  I am going to increase her spironolactone to 50 mg daily.  She is thankful she does not have to take her potassium pills anymore.     Heart failure appears a bit improved but still not ideal.  Clinically it is not bothering her.  Increase spironolactone should help with that.  N-terminal proBNP is now 2145    We talked about her peripheral edema, the importance of a low-salt diet, the importance of diet high in fresh fruits and vegetables to increase her potassium and magnesium.  We also talked about the importance of elevating her legs.  We also talked about the importance of exercise and the concept of dependent edema and non mobile elderly     She does have hyperaldosterone state which the spironolactone should help.  I have told her to call and see if  they can get her endocrine evaluation moved up     June clearly has congestive heart failure acute on chronic on a diastolic basis.  I think this is multifactorial partially due to her A-fib.  Partially due to her poorly controlled blood pressure and diastolic dysfunction.  I will start her on spironolactone.  Will get good control of her blood pressure.  I will diurese as able.  Will consider starting Jardiance down the road.  I will also schedule a stress MRI given her mildly decreased left ventricular function.  I assume this is all longstanding poorly treated high blood pressure and diastolic dysfunction.  I will do the MRI to make sure we do not also have an infiltrative cardiomyopathy     A-fib rate appears to be adequately controlled with the change to carvedilol.     I will have her follow-up with me or an FRANKLIN in 1 month to review her MRI.  I will recheck a BMP and N-terminal proBNP at that time.     Thank you for allow me to participate in this patient's care.  Sincerely,                                Talat Medrano MD [facility]    The longitudinal plan of care for the diagnosis(es)/condition(s) as documented were addressed during this visit. Due to the added complexity in care, I will continue to support Mercedes in the subsequent management and with ongoing continuity of care.      Today's clinic visit entailed:  Review of the result(s) of each unique test - lab work, Lexiscan, Zio Patch  Ordering of each unique test  Prescription drug management  55 minutes spent by me on the date of the encounter doing chart review, history and exam, documentation and further activities per the note  Provider  Link to [facility] Help Grid     The level of medical decision making during this visit was of high complexity.      Orders Placed This Encounter   Procedures    Basic metabolic panel    N terminal pro BNP outpatient    Basic metabolic panel    N terminal pro BNP outpatient    Follow-Up with Cardiology FRANKLIN    Follow-Up with  Cardiology       Orders Placed This Encounter   Medications    spironolactone (ALDACTONE) 50 MG tablet     Sig: Take 1 tablet (50 mg) by mouth daily     Dispense:  90 tablet     Refill:  4       Medications Discontinued During This Encounter   Medication Reason    senna-docusate (SENOKOT-S/PERICOLACE) 8.6-50 MG tablet     spironolactone (ALDACTONE) 25 MG tablet          Encounter Diagnoses   Name Primary?    SOB (shortness of breath)     Peripheral edema     PAF (paroxysmal atrial fibrillation) (H)     Hypokalemia     Labile hypertension     Acute on chronic diastolic congestive heart failure (H)        CURRENT MEDICATIONS:  Current Outpatient Medications   Medication Sig Dispense Refill    acetaminophen (TYLENOL) 325 MG tablet Take 2 tablets (650 mg) by mouth every 4 hours as needed for other (For optimal non-opioid multimodal pain management to improve pain control.) 30 tablet 0    albuterol (PROAIR HFA/PROVENTIL HFA/VENTOLIN HFA) 108 (90 Base) MCG/ACT inhaler Inhale 2 puffs into the lungs every 6 hours as needed for shortness of breath, wheezing or cough 18 g 0    apixaban ANTICOAGULANT (ELIQUIS ANTICOAGULANT) 5 MG tablet Take 1 tablet (5 mg) by mouth 2 times daily 180 tablet 4    carvedilol (COREG) 25 MG tablet Take 1 tablet (25 mg) by mouth 2 times daily (with meals) 180 tablet 4    clobetasol (TEMOVATE) 0.05 % external ointment Apply thin layer to affected area daily      irbesartan (AVAPRO) 75 MG tablet Take 1 tablet by mouth daily at 2 pm      Multiple Vitamin (MULTI-VITAMIN) per tablet Take 1 tablet by mouth daily      prednisoLONE acetate (PRED FORTE) 1 % ophthalmic suspension Place 1 drop into both eyes 4 times daily      rosuvastatin (CRESTOR) 20 MG tablet Take 20 mg by mouth daily      spironolactone (ALDACTONE) 50 MG tablet Take 1 tablet (50 mg) by mouth daily 90 tablet 4    VITAMIN D, CHOLECALCIFEROL, PO Take 1 capsule by mouth daily      B Complex Vitamins (VITAMIN  B COMPLEX) tablet Take 1 tablet by  mouth daily (Patient not taking: Reported on 2024)      Coenzyme Q10 (COQ-10 PO) Take 1 tablet by mouth daily (Patient not taking: Reported on 2024)         ALLERGIES     Allergies   Allergen Reactions    Dust Mites Other (See Comments)    Iodine-131 Angioedema, Hives and Itching       PAST MEDICAL HISTORY:  Past Medical History:   Diagnosis Date    Dyslipidemia     Hypertension     Osteoarthritis        PAST SURGICAL HISTORY:  Past Surgical History:   Procedure Laterality Date    CATARACT EXTRACTION, BILATERAL  2023    OPEN REDUCTION INTERNAL FIXATION FEMUR DISTAL Right 2023    Procedure: Open reduction internal fixation, right distal femur fracture with retrograde nail;  Surgeon: Hal Kimball MD;  Location:  OR       FAMILY HISTORY:  Family History   Problem Relation Age of Onset    Breast Cancer Mother 80    Myocardial Infarction Father 57         @ 63 from [place]       SOCIAL HISTORY:  Social History     Socioeconomic History    Marital status:      Spouse name: None    Number of children: None    Years of education: None    Highest education level: None   Tobacco Use    Smoking status: Never    Smokeless tobacco: Never   Substance and Sexual Activity    Alcohol use: No     Social Determinants of Health     Financial Resource Strain: Low Risk  (2024)    Financial Resource Strain     Within the past 12 months, have you or your family members you live with been unable to get utilities (heat, electricity) when it was really needed?: No   Food Insecurity: Low Risk  (2024)    Food Insecurity     Within the past 12 months, did you worry that your food would run out before you got money to buy more?: No     Within the past 12 months, did the food you bought just not last and you didn t have money to get more?: No   Transportation Needs: Low Risk  (2024)    Transportation Needs     Within the past 12 months, has lack of transportation kept you from medical appointments,  "getting your medicines, non-medical meetings or appointments, work, or from getting things that you need?: No   Physical Activity: Unknown (5/16/2024)    Exercise Vital Sign     Days of Exercise per Week: 3 days   Stress: No Stress Concern Present (5/16/2024)    [employer] - Occupational Stress Questionnaire     Feeling of Stress : Not at all   Social Connections: Unknown (5/16/2024)    Social Connection and Isolation Panel [NHANES]     Frequency of Social Gatherings with Friends and Family: Twice a week   Interpersonal Safety: Low Risk  (12/1/2023)    Interpersonal Safety     Do you feel physically and emotionally safe where you currently live?: Yes     Within the past 12 months, have you been hit, slapped, kicked or otherwise physically hurt by someone?: No     Within the past 12 months, have you been humiliated or emotionally abused in other ways by your partner or ex-partner?: No   Housing Stability: High Risk (5/16/2024)    Housing Stability     Do you have housing? : No     Are you worried about losing your housing?: No       Review of Systems:  Skin:          Eyes:         ENT:         Respiratory:  Positive for cough     Cardiovascular:    palpitations;Positive for;edema    Gastroenterology:        Genitourinary:         Musculoskeletal:         Neurologic:         Psychiatric:         Heme/Lymph/Imm:         Endocrine:           Physical Exam:  Vitals: BP (!) 148/78   Pulse 100   Ht 1.778 m (5' 10\")   Wt 76.7 kg (169 lb)   BMI 24.25 kg/m      Constitutional:  cooperative, alert and oriented, well developed, well nourished, in no acute distress overweight Uses walker    Skin:  warm and dry to the touch, no apparent skin lesions or masses noted          Head:  normocephalic, no masses or lesions        Eyes:  pupils equal and round, conjunctivae and lids unremarkable, sclera white, no xanthalasma, EOMS intact, no nystagmus        Lymph:      ENT:  no pallor or cyanosis, " dentition good        Neck:  no carotid bruit        Respiratory:  normal breath sounds, clear to auscultation, normal A-P diameter, normal symmetry, normal respiratory excursion, no use of accessory muscles         Cardiac: no murmurs, gallops or rubs detected irregularly irregular rhythm           Rate controlled  pulses full and equal                                        GI:           Extremities and Muscular Skeletal:  no spinal abnormalities noted;normal muscle strength and tone   bilateral LE edema;L greater than R;1+;pitting          Neurological:  no gross motor deficits        Psych:  affect appropriate, oriented to time, person and place        CC  Talat Medrano MD  [address] [facility] [address] [address]        Thank you for allowing me to participate in the care of your patient.      Sincerely,     Talat Medrano MD     [facility]

## 2024-05-28 NOTE — PROGRESS NOTES
HPI and Plan:   Mercedes is a very nice 78-year-old woman who follows with my partner Dr. Best Muhammad.  I met her urgently in May because of newly diagnosed atrial fibrillation.     She has longstanding labile hypertension, hypercholesterolemia, hypokalemia, dependent edema.  In December she was seen in the ER for her labile hypertension.  Later in December she again was seen after mechanical fall resulting in a fracture of her right femur.  On all these visits and previous event monitoring she is not been found to have A-fib although there was suspicion that she may have it.     This spring she noted she had increased cough.  She has worsening peripheral edema left greater than right and PND.     She was seen in her primary care office where chest x-ray demonstrated enlarged cardiac silhouette.  Subsequent echocardiogram noted atrial fibrillation.  She is completely unaware of her atrial fibrillation.     Family history is significant for mother dying at age 94.  Her father had coronary disease at age 63.  She is a lifelong non-smoker.  She does not drink alcohol.  She does not appear to have diabetes although some sugars are elevated possibly impaired fasting glucose versus nonfasting blood draws.     She denies exertional chest arm neck jaw or shoulder discomfort.  She has no lightheadedness dizziness syncope or near syncope.  She states she may be aware of her rhythm when she lies down at nighttime.  Her chief complaint is her peripheral edema.    An April echocardiogram demonstrates ejection fraction of 50 to 55% with moderate concentric left ventricular hypertrophy she has mild to moderate mitral regurgitation and tricuspid regurgitation.  Ascending aorta is borderline dilated at 3.8 cm.  IVC is described as dilated with decreased respiratory variation.  Pulmonary pressures are estimated to be 19 mmHg plus right atrial pressure     Review of the chart shows normal CBC.  She had an N-terminal proBNP of 3644.   Glucose is elevated at 126.  Most recent potassium is 3.3 EKG demonstrates atrial fibrillation with a rate of 85 with left anterior fascicular block.    Workup to date includes a Lexiscan which appears to be negative for ischemia.  A 7-day Zio patch showing average heart rate of 88 ranging from .  I also checked an aldosterone renin ratio that came back at 44.  An endocrinology consult has been placed with an appointment for December.    She returns to clinic and states she is still feeling quite well.  Her chief complaint is still her peripheral edema.  She has no symptoms to suggest a TIA or CVA.  She is having no bleeding problems on Eliquis therapy    Review of the chart shows an ER visit for pneumonia but I suspect also component of heart failure.     Assessment and plan.  Olga has no symptoms to suggest ischemia and this is supported by her Lexiscan    Home blood pressures are very well-controlled.  It is high today in the office.  I am going to increase her spironolactone to 50 mg daily.  She is thankful she does not have to take her potassium pills anymore.     Heart failure appears a bit improved but still not ideal.  Clinically it is not bothering her.  Increase spironolactone should help with that.  N-terminal proBNP is now 2145    We talked about her peripheral edema, the importance of a low-salt diet, the importance of diet high in fresh fruits and vegetables to increase her potassium and magnesium.  We also talked about the importance of elevating her legs.  We also talked about the importance of exercise and the concept of dependent edema and non mobile elderly     She does have hyperaldosterone state which the spironolactone should help.  I have told her to call and see if they can get her endocrine evaluation moved up     June clearly has congestive heart failure acute on chronic on a diastolic basis.  I think this is multifactorial partially due to her A-fib.  Partially due to her poorly  controlled blood pressure and diastolic dysfunction.  I will start her on spironolactone.  Will get good control of her blood pressure.  I will diurese as able.  Will consider starting Jardiance down the road.  I will also schedule a stress MRI given her mildly decreased left ventricular function.  I assume this is all longstanding poorly treated high blood pressure and diastolic dysfunction.  I will do the MRI to make sure we do not also have an infiltrative cardiomyopathy     A-fib rate appears to be adequately controlled with the change to carvedilol.     I will have her follow-up with me or an FRANKLIN in 1 month to review her MRI.  I will recheck a BMP and N-terminal proBNP at that time.     Thank you for allow me to participate in this patient's care.  Sincerely,                                Talat Medrano MD [facility]    The longitudinal plan of care for the diagnosis(es)/condition(s) as documented were addressed during this visit. Due to the added complexity in care, I will continue to support Mercedes in the subsequent management and with ongoing continuity of care.      Today's clinic visit entailed:  Review of the result(s) of each unique test - lab work, Lexiscan, Zio Patch  Ordering of each unique test  Prescription drug management  55 minutes spent by me on the date of the encounter doing chart review, history and exam, documentation and further activities per the note  Provider  Link to [facility] Help Grid     The level of medical decision making during this visit was of high complexity.      Orders Placed This Encounter   Procedures    Basic metabolic panel    N terminal pro BNP outpatient    Basic metabolic panel    N terminal pro BNP outpatient    Follow-Up with Cardiology FRANKLIN    Follow-Up with Cardiology       Orders Placed This Encounter   Medications    spironolactone (ALDACTONE) 50 MG tablet     Sig: Take 1 tablet (50 mg) by mouth daily     Dispense:  90 tablet     Refill:  4       Medications Discontinued  During This Encounter   Medication Reason    senna-docusate (SENOKOT-S/PERICOLACE) 8.6-50 MG tablet     spironolactone (ALDACTONE) 25 MG tablet          Encounter Diagnoses   Name Primary?    SOB (shortness of breath)     Peripheral edema     PAF (paroxysmal atrial fibrillation) (H)     Hypokalemia     Labile hypertension     Acute on chronic diastolic congestive heart failure (H)        CURRENT MEDICATIONS:  Current Outpatient Medications   Medication Sig Dispense Refill    acetaminophen (TYLENOL) 325 MG tablet Take 2 tablets (650 mg) by mouth every 4 hours as needed for other (For optimal non-opioid multimodal pain management to improve pain control.) 30 tablet 0    albuterol (PROAIR HFA/PROVENTIL HFA/VENTOLIN HFA) 108 (90 Base) MCG/ACT inhaler Inhale 2 puffs into the lungs every 6 hours as needed for shortness of breath, wheezing or cough 18 g 0    apixaban ANTICOAGULANT (ELIQUIS ANTICOAGULANT) 5 MG tablet Take 1 tablet (5 mg) by mouth 2 times daily 180 tablet 4    carvedilol (COREG) 25 MG tablet Take 1 tablet (25 mg) by mouth 2 times daily (with meals) 180 tablet 4    clobetasol (TEMOVATE) 0.05 % external ointment Apply thin layer to affected area daily      irbesartan (AVAPRO) 75 MG tablet Take 1 tablet by mouth daily at 2 pm      Multiple Vitamin (MULTI-VITAMIN) per tablet Take 1 tablet by mouth daily      prednisoLONE acetate (PRED FORTE) 1 % ophthalmic suspension Place 1 drop into both eyes 4 times daily      rosuvastatin (CRESTOR) 20 MG tablet Take 20 mg by mouth daily      spironolactone (ALDACTONE) 50 MG tablet Take 1 tablet (50 mg) by mouth daily 90 tablet 4    VITAMIN D, CHOLECALCIFEROL, PO Take 1 capsule by mouth daily      B Complex Vitamins (VITAMIN  B COMPLEX) tablet Take 1 tablet by mouth daily (Patient not taking: Reported on 5/28/2024)      Coenzyme Q10 (COQ-10 PO) Take 1 tablet by mouth daily (Patient not taking: Reported on 5/28/2024)         ALLERGIES     Allergies   Allergen Reactions    Dust  Mites Other (See Comments)    Iodine-131 Angioedema, Hives and Itching       PAST MEDICAL HISTORY:  Past Medical History:   Diagnosis Date    Dyslipidemia     Hypertension     Osteoarthritis        PAST SURGICAL HISTORY:  Past Surgical History:   Procedure Laterality Date    CATARACT EXTRACTION, BILATERAL  2023    OPEN REDUCTION INTERNAL FIXATION FEMUR DISTAL Right 2023    Procedure: Open reduction internal fixation, right distal femur fracture with retrograde nail;  Surgeon: Hal Kimball MD;  Location: [facility] OR       FAMILY HISTORY:  Family History   Problem Relation Age of Onset    Breast Cancer Mother 80    Myocardial Infarction Father 57         @ 63 from [place]       SOCIAL HISTORY:  Social History     Socioeconomic History    Marital status:      Spouse name: None    Number of children: None    Years of education: None    Highest education level: None   Tobacco Use    Smoking status: Never    Smokeless tobacco: Never   Substance and Sexual Activity    Alcohol use: No     Social Determinants of Health     Financial Resource Strain: Low Risk  (2024)    Financial Resource Strain     Within the past 12 months, have you or your family members you live with been unable to get utilities (heat, electricity) when it was really needed?: No   Food Insecurity: Low Risk  (2024)    Food Insecurity     Within the past 12 months, did you worry that your food would run out before you got money to buy more?: No     Within the past 12 months, did the food you bought just not last and you didn t have money to get more?: No   Transportation Needs: Low Risk  (2024)    Transportation Needs     Within the past 12 months, has lack of transportation kept you from medical appointments, getting your medicines, non-medical meetings or appointments, work, or from getting things that you need?: No   Physical Activity: Unknown (2024)    Exercise Vital Sign     Days of Exercise per Week: 3 days   Stress: No  "Stress Concern Present (5/16/2024)    [employer] - Occupational Stress Questionnaire     Feeling of Stress : Not at all   Social Connections: Unknown (5/16/2024)    Social Connection and Isolation Panel [NHANES]     Frequency of Social Gatherings with Friends and Family: Twice a week   Interpersonal Safety: Low Risk  (12/1/2023)    Interpersonal Safety     Do you feel physically and emotionally safe where you currently live?: Yes     Within the past 12 months, have you been hit, slapped, kicked or otherwise physically hurt by someone?: No     Within the past 12 months, have you been humiliated or emotionally abused in other ways by your partner or ex-partner?: No   Housing Stability: High Risk (5/16/2024)    Housing Stability     Do you have housing? : No     Are you worried about losing your housing?: No       Review of Systems:  Skin:          Eyes:         ENT:         Respiratory:  Positive for cough     Cardiovascular:    palpitations;Positive for;edema    Gastroenterology:        Genitourinary:         Musculoskeletal:         Neurologic:         Psychiatric:         Heme/Lymph/Imm:         Endocrine:           Physical Exam:  Vitals: BP (!) 148/78   Pulse 100   Ht 1.778 m (5' 10\")   Wt 76.7 kg (169 lb)   BMI 24.25 kg/m      Constitutional:  cooperative, alert and oriented, well developed, well nourished, in no acute distress overweight Uses walker    Skin:  warm and dry to the touch, no apparent skin lesions or masses noted          Head:  normocephalic, no masses or lesions        Eyes:  pupils equal and round, conjunctivae and lids unremarkable, sclera white, no xanthalasma, EOMS intact, no nystagmus        Lymph:      ENT:  no pallor or cyanosis, dentition good        Neck:  no carotid bruit        Respiratory:  normal breath sounds, clear to auscultation, normal A-P diameter, normal symmetry, normal respiratory excursion, no use of accessory muscles         Cardiac: no " murmurs, gallops or rubs detected irregularly irregular rhythm           Rate controlled  pulses full and equal                                        GI:           Extremities and Muscular Skeletal:  no spinal abnormalities noted;normal muscle strength and tone   bilateral LE edema;L greater than R;1+;pitting          Neurological:  no gross motor deficits        Psych:  affect appropriate, oriented to time, person and place        CC  Talat Medrano MD  [address]  [place] [id number]

## 2024-06-28 ENCOUNTER — TELEPHONE (OUTPATIENT)
Dept: CARDIOLOGY | Facility: CLINIC | Age: 79
End: 2024-06-28

## 2024-06-28 ENCOUNTER — LAB (OUTPATIENT)
Dept: LAB | Facility: CLINIC | Age: 79
End: 2024-06-28
Payer: COMMERCIAL

## 2024-06-28 DIAGNOSIS — I50.33 ACUTE ON CHRONIC DIASTOLIC CONGESTIVE HEART FAILURE (H): ICD-10-CM

## 2024-06-28 LAB
ANION GAP SERPL CALCULATED.3IONS-SCNC: 11 MMOL/L (ref 7–15)
BUN SERPL-MCNC: 19.3 MG/DL (ref 8–23)
CALCIUM SERPL-MCNC: 9.7 MG/DL (ref 8.8–10.2)
CHLORIDE SERPL-SCNC: 96 MMOL/L (ref 98–107)
CREAT SERPL-MCNC: 0.77 MG/DL (ref 0.51–0.95)
DEPRECATED HCO3 PLAS-SCNC: 24 MMOL/L (ref 22–29)
EGFRCR SERPLBLD CKD-EPI 2021: 78 ML/MIN/1.73M2
GLUCOSE SERPL-MCNC: 94 MG/DL (ref 70–99)
NT-PROBNP SERPL-MCNC: 2272 PG/ML (ref 0–1800)
POTASSIUM SERPL-SCNC: 4.1 MMOL/L (ref 3.4–5.3)
SODIUM SERPL-SCNC: 131 MMOL/L (ref 135–145)

## 2024-06-28 PROCEDURE — 80048 BASIC METABOLIC PNL TOTAL CA: CPT | Performed by: INTERNAL MEDICINE

## 2024-06-28 PROCEDURE — 83880 ASSAY OF NATRIURETIC PEPTIDE: CPT | Performed by: INTERNAL MEDICINE

## 2024-06-28 PROCEDURE — 36415 COLL VENOUS BLD VENIPUNCTURE: CPT | Performed by: INTERNAL MEDICINE

## 2024-06-28 NOTE — TELEPHONE ENCOUNTER
Labs routed to Dr Medrano to review, drawn after increasing spironolactone to 50mg daily at visit 5/28/24 for HTN, CHF, dyspnea.     Left a message for patient to call back with BP/symptom update, will routed to provider pending patient reply.       Component      Latest Ref Rng 5/28/2024  8:25 AM 6/28/2024  9:31 AM   Sodium      135 - 145 mmol/L 138  131 (L)    Potassium      3.4 - 5.3 mmol/L 3.6  4.1    Chloride      98 - 107 mmol/L 100  96 (L)    Carbon Dioxide (CO2)      22 - 29 mmol/L 25  24    Anion Gap      7 - 15 mmol/L 13  11    Urea Nitrogen      8.0 - 23.0 mg/dL 12.9  19.3    Creatinine      0.51 - 0.95 mg/dL 0.65  0.77    GFR Estimate      >60 mL/min/1.73m2 89  78    Calcium      8.8 - 10.2 mg/dL 9.4  9.7    Glucose      70 - 99 mg/dL 94  94    N-Terminal Pro Bnp      0 - 1,800 pg/mL 2,145 (H)  2,272 (H)

## 2024-07-01 NOTE — TELEPHONE ENCOUNTER
"Spoke to patient, says her BP has gone a bit, yesterday was 126/85, 135/97, 123/79 (taken all at one time). She does not check her BP very often. The edema in her feet/ankles is improved. Patient does not notice any overt shortness of breath, says she's \"Feeling fine.\"     Follow up is 7/12 w/ Garrick Simms NP, MRI on 7/11. Dr Medrano updated.   "

## 2024-07-09 ENCOUNTER — TELEPHONE (OUTPATIENT)
Dept: CARDIOLOGY | Facility: CLINIC | Age: 79
End: 2024-07-09
Payer: COMMERCIAL

## 2024-07-09 NOTE — TELEPHONE ENCOUNTER
"----- Message from Melissa CARREON sent at 7/9/2024 12:06 PM CDT -----  Regarding: order clarification needed  Hello!    In reviewing Dr Medrano's note the following was dictated:     \"I will also schedule a stress MRI given her mildly decreased left ventricular function.  I assume this is all longstanding poorly treated high blood pressure and diastolic dysfunction.  I will do the MRI to make sure we do not also have an infiltrative cardiomyopathy\"    A MR Cardiac without stress was ordered and scheduled for 7/11/2024. It looks as though he may want the stress exam. Could you please verify? In the event that he would like a stress exam, we will need to update her prep to no caffeine for 12 hours prior.    Thank you!    Melissa Carlos RT(R)(CT)(MR)  [facility]  "

## 2024-07-10 NOTE — TELEPHONE ENCOUNTER
Reply from Dr. Medrano:  No she had a Lexiscan that was normal.       Called MRI team with update to continue with MRI only.

## 2024-07-11 ENCOUNTER — HOSPITAL ENCOUNTER (OUTPATIENT)
Dept: CARDIOLOGY | Facility: CLINIC | Age: 79
Discharge: HOME | End: 2024-07-11
Attending: INTERNAL MEDICINE | Admitting: INTERNAL MEDICINE
Payer: COMMERCIAL

## 2024-07-11 VITALS — SYSTOLIC BLOOD PRESSURE: 141 MMHG | DIASTOLIC BLOOD PRESSURE: 87 MMHG

## 2024-07-11 DIAGNOSIS — I50.33 ACUTE ON CHRONIC DIASTOLIC CONGESTIVE HEART FAILURE (H): ICD-10-CM

## 2024-07-11 PROCEDURE — A9585 GADOBUTROL INJECTION: HCPCS | Performed by: INTERNAL MEDICINE

## 2024-07-11 PROCEDURE — 75561 CARDIAC MRI FOR MORPH W/DYE: CPT | Mod: 26 | Performed by: INTERNAL MEDICINE

## 2024-07-11 PROCEDURE — 75561 CARDIAC MRI FOR MORPH W/DYE: CPT

## 2024-07-11 PROCEDURE — 255N000002 HC RX 255 OP 636: Performed by: INTERNAL MEDICINE

## 2024-07-11 RX ORDER — LIDOCAINE 40 MG/G
CREAM TOPICAL
OUTPATIENT
Start: 2024-07-11

## 2024-07-11 RX ORDER — GADOBUTROL 604.72 MG/ML
10 INJECTION INTRAVENOUS ONCE
Status: COMPLETED | OUTPATIENT
Start: 2024-07-11 | End: 2024-07-11

## 2024-07-11 RX ORDER — SODIUM CHLORIDE 9 MG/ML
INJECTION, SOLUTION INTRAVENOUS CONTINUOUS
Status: ACTIVE | OUTPATIENT
Start: 2024-07-11 | End: 2024-07-11

## 2024-07-11 RX ORDER — NITROGLYCERIN 0.4 MG/1
0.4 TABLET SUBLINGUAL EVERY 5 MIN PRN
Status: ACTIVE | OUTPATIENT
Start: 2024-07-11 | End: 2024-07-11

## 2024-07-11 RX ORDER — DIAZEPAM 5 MG
5 TABLET ORAL EVERY 30 MIN PRN
Status: DISCONTINUED | OUTPATIENT
Start: 2024-07-11 | End: 2024-07-12 | Stop reason: HOSPADM

## 2024-07-11 RX ORDER — LORAZEPAM 0.5 MG/1
0.5 TABLET ORAL EVERY 10 MIN PRN
Status: DISCONTINUED | OUTPATIENT
Start: 2024-07-11 | End: 2024-07-12 | Stop reason: HOSPADM

## 2024-07-11 RX ADMIN — GADOBUTROL 10 ML: 604.72 INJECTION INTRAVENOUS at 16:09

## 2024-07-12 ENCOUNTER — OFFICE VISIT (OUTPATIENT)
Dept: CARDIOLOGY | Facility: CLINIC | Age: 79
End: 2024-07-12
Attending: INTERNAL MEDICINE
Payer: COMMERCIAL

## 2024-07-12 VITALS
SYSTOLIC BLOOD PRESSURE: 112 MMHG | HEART RATE: 87 BPM | OXYGEN SATURATION: 95 % | WEIGHT: 165.3 LBS | HEIGHT: 69 IN | DIASTOLIC BLOOD PRESSURE: 68 MMHG | BODY MASS INDEX: 24.48 KG/M2

## 2024-07-12 DIAGNOSIS — I10 ESSENTIAL HYPERTENSION: ICD-10-CM

## 2024-07-12 DIAGNOSIS — E87.6 HYPOKALEMIA: ICD-10-CM

## 2024-07-12 DIAGNOSIS — I48.19 PERSISTENT ATRIAL FIBRILLATION (H): ICD-10-CM

## 2024-07-12 DIAGNOSIS — I31.39 PERICARDIAL EFFUSION: ICD-10-CM

## 2024-07-12 DIAGNOSIS — R06.02 SOB (SHORTNESS OF BREATH): ICD-10-CM

## 2024-07-12 DIAGNOSIS — I48.0 PAF (PAROXYSMAL ATRIAL FIBRILLATION) (H): ICD-10-CM

## 2024-07-12 DIAGNOSIS — I50.33 ACUTE ON CHRONIC DIASTOLIC CONGESTIVE HEART FAILURE (H): ICD-10-CM

## 2024-07-12 DIAGNOSIS — R60.0 PERIPHERAL EDEMA: ICD-10-CM

## 2024-07-12 DIAGNOSIS — E26.9 HYPERALDOSTERONISM (H): ICD-10-CM

## 2024-07-12 DIAGNOSIS — I50.32 CHRONIC HEART FAILURE WITH PRESERVED EJECTION FRACTION (HFPEF) (H): Primary | ICD-10-CM

## 2024-07-12 DIAGNOSIS — R09.89 LABILE HYPERTENSION: ICD-10-CM

## 2024-07-12 PROCEDURE — 99215 OFFICE O/P EST HI 40 MIN: CPT | Performed by: NURSE PRACTITIONER

## 2024-07-12 NOTE — PATIENT INSTRUCTIONS
Thank you for your visit with the [facility] today.    Medication changes and/or recommendations from today:   - Continue your current medications without changes.  - Check your weights daily and try to stick to a low sodium diet (under 2,000 mg/day).  - Call the clinic if you have signs concerning for fluid retention, including weight gain of over 3 pounds in 1 night or over 5 pounds in 1 week, worsening shortness of breath, or worsening swelling in the legs or abdomen.     Follow up plan:   - Check an echocardiogram in 1-2 months to recheck the pericardial effusion.   - Follow up with Dr. Medrano in October as planned with the labs beforehand.    If you have questions or concerns in the meantime please call the nurse team at [phone] or send a Letao message.     Scheduling phone number: [phone]    It was a pleasure seeing you today!     LYNDSAY Liu, CNP  Nurse Practitioner  [facility]

## 2024-07-12 NOTE — PROGRESS NOTES
Cardiology Clinic Progress Note    Service Date: July 12, 2024  Primary Cardiology Team: Dr. Muhammad/    HISTORY OF PRESENT ILLNESS:  I had the pleasure of seeing Ms. Mercedes Nelson in the clinic today. She is a very pleasant 78 year old female with a past medical history notable for labile hypertension, hyperlipidemia, hypokalemia, dependent edema, recently diagnosed persistent atrial fibrillation, and chronic HFpEF.  She has previously followed with Dr. Muhammad for cardiology care and more recently has met with Dr. Medrano last few times in the clinic after meeting with him urgently in May of this year for newly diagnosed atrial fibrillation.  She was seen by her primary care team in the spring due to issues with some coughing and a chest x-ray showed enlarged cardiac silhouette.  She subsequently underwent an echocardiogram on 4/30/2024 and she was found to be in atrial fibrillation at that time with low normal EF 50-55%, moderate-severe biatrial enlargement, mild to moderate (1-2+) mitral regurgitation, mild to moderate (1-2+) tricuspid regurgitation. Ascending aorta is borderline dilated at 3.8 cm.    Lexiscan nuclear stress test was subsequently completed which appears to be negative for ischemia. A 7-day Zio patch showing average heart rate of 88 bpm ranging from . An aldosterone renin ratio came back elevated at 44, so an endocrinology consult was placed with an appointment setup for December.     The patient has been completely unaware of her atrial fibrillation without any sensation of palpitations or heart racing.  She last met with Dr. Medrano in the clinic on 5/28/2024 and was doing relatively well from a cardiac standpoint, but did complain of some increased peripheral edema.  She otherwise was minimally symptomatic despite N-terminal proBNP level being elevated at 2145.  She was instructed to try increasing spironolactone from 25 mg to 50 mg once daily for better blood pressure  control and heart failure management.    Labs were checked on 6/28/2024 with a basic metabolic panel showing mild hyponatremia with sodium level of 131, otherwise stable electrolytes and renal function with potassium level 4.1 and creatinine at 0.77.  Another NT pro BNP level was rechecked and was about the same at 2272.    A cardiac MRI was subsequently completed on 7/11/24 showing. Mildly reduced LV and RV systolic functions with LVEF of 53%, small pericardial effusion, and late gadolinium enhancement imaging showing no MI, fibrosis or infiltrative disease. Renal cysts were also incidentally noted on the report.    Today, Ms. Nelson presents to the clinic accompanied by her  and daughter in follow up of her recent medication change and cardiac MRI.  She tells me that she has been feeling a bit better over the last few weeks with the higher dose of spironolactone.  Blood pressure has remained labile and her checks at home, but overall seems to be under better control.  Her weight has trended down around 5-10 pounds over the past couple of months to 165 pounds on the clinic scale today.  She feels her lower extremity edema has improved as well.  She otherwise denies any symptoms of palpitations, heart racing, dizziness, or lightheadedness.  She has not had chest pain or any significant exertional dyspnea.      She notes that she had a very difficult winter with a mechanical fall and right femur fracture, as well as later COVID infection which she feels that her back a lot.  She now feels like she is finally turning a corner and has regained some of her strength and stamina.  She has been walking more this summer and feels that she is tolerating this well without significant limitations.    ASSESSMENT:  1. Chronic heart failure with preserved ejection fraction (HFpEF).  Appears well compensated and near euvolemic on exam.  2. Persistent atrial fibrillation, minimally symptomatic. Rate controlled with  carvedilol 25 mg twice daily and anticoagulated with Eliquis 5 mg twice daily.  3. Labile hypertension with improvement in blood pressure control following the increase in her spironolactone dose recently.  4. Hyperaldosteronism, referral placed to meet with endocrinology and appointment set for next December.  5. Small pericardial effusion incidentally noted on recent cardiac MRI.    PLAN:  - Continue current cardiac regimen without changes.  - Counseled on continuing to check daily weights, trying to stick to a low-sodium diet, monitoring for signs/symptoms of fluid retention. Reviewed when to call the clinic.  - Recommend a repeat echocardiogram in 1-2 months for reassessment of the small pericardial effusion incidentally noted on the cardiac MRI.  - Follow-up with Dr. Medrano as previously planned in October with labs beforehand for repeat BMP and NT pro BNP level.    Thank you for the opportunity to participate in this pleasant patient's care. We would be happy to see her sooner if needed for any concerns in the meantime.    40 total minutes was spent today including chart review, precharting, history and exam, post visit documentation, and reviewing studies as outlined above.     LYNDSAY Liu, CNP   Nurse Practitioner  [facility]  Pager: [phone]  Text Page  (8am - 5pm, M-F)    Orders this Visit:  Orders Placed This Encounter   Procedures    Echocardiogram Complete     No orders of the defined types were placed in this encounter.    There are no discontinued medications.  Encounter Diagnoses   Name Primary?    Chronic heart failure with preserved ejection fraction (HFpEF) (H) Yes    Persistent atrial fibrillation (H)     Labile hypertension     Hyperaldosteronism (H24)     Essential hypertension     SOB (shortness of breath)     Peripheral edema     PAF (paroxysmal atrial fibrillation) (H)     Hypokalemia     Acute on chronic diastolic congestive heart failure (H)     Pericardial  effusion        CURRENT MEDICATIONS:  Current Outpatient Medications   Medication Sig Dispense Refill    acetaminophen (TYLENOL) 325 MG tablet Take 2 tablets (650 mg) by mouth every 4 hours as needed for other (For optimal non-opioid multimodal pain management to improve pain control.) 30 tablet 0    albuterol (PROAIR HFA/PROVENTIL HFA/VENTOLIN HFA) 108 (90 Base) MCG/ACT inhaler Inhale 2 puffs into the lungs every 6 hours as needed for shortness of breath, wheezing or cough 18 g 0    apixaban ANTICOAGULANT (ELIQUIS ANTICOAGULANT) 5 MG tablet Take 1 tablet (5 mg) by mouth 2 times daily 180 tablet 4    carvedilol (COREG) 25 MG tablet Take 1 tablet (25 mg) by mouth 2 times daily (with meals) 180 tablet 4    clobetasol (TEMOVATE) 0.05 % external ointment Apply thin layer to affected area daily      irbesartan (AVAPRO) 75 MG tablet Take 1 tablet by mouth daily at 2 pm      Multiple Vitamin (MULTI-VITAMIN) per tablet Take 1 tablet by mouth daily      prednisoLONE acetate (PRED FORTE) 1 % ophthalmic suspension Place 1 drop into both eyes 4 times daily      rosuvastatin (CRESTOR) 20 MG tablet Take 20 mg by mouth daily      spironolactone (ALDACTONE) 50 MG tablet Take 1 tablet (50 mg) by mouth daily 90 tablet 4    VITAMIN D, CHOLECALCIFEROL, PO Take 1 capsule by mouth daily      B Complex Vitamins (VITAMIN  B COMPLEX) tablet Take 1 tablet by mouth daily (Patient not taking: Reported on 5/28/2024)      Coenzyme Q10 (COQ-10 PO) Take 1 tablet by mouth daily (Patient not taking: Reported on 5/28/2024)         ALLERGIES  Allergies   Allergen Reactions    Dust Mites Other (See Comments)    Iodine-131 Angioedema, Hives and Itching       PAST MEDICAL, SURGICAL, FAMILY HISTORY:  History was reviewed and updated as needed, see medical record.    SOCIAL HISTORY:  Social History     Socioeconomic History    Marital status:      Spouse name: Not on file    Number of children: Not on file    Years of education: Not on file     Highest education level: Not on file   Occupational History    Not on file   Tobacco Use    Smoking status: Never    Smokeless tobacco: Never   Substance and Sexual Activity    Alcohol use: No    Drug use: Not on file    Sexual activity: Not on file   Other Topics Concern    Not on file   Social History Narrative    Not on file     Social Determinants of Health     Financial Resource Strain: Low Risk  (5/16/2024)    Financial Resource Strain     Within the past 12 months, have you or your family members you live with been unable to get utilities (heat, electricity) when it was really needed?: No   Food Insecurity: Low Risk  (5/16/2024)    Food Insecurity     Within the past 12 months, did you worry that your food would run out before you got money to buy more?: No     Within the past 12 months, did the food you bought just not last and you didn t have money to get more?: No   Transportation Needs: Low Risk  (5/16/2024)    Transportation Needs     Within the past 12 months, has lack of transportation kept you from medical appointments, getting your medicines, non-medical meetings or appointments, work, or from getting things that you need?: No   Physical Activity: Unknown (5/16/2024)    Exercise Vital Sign     Days of Exercise per Week: 3 days     Minutes of Exercise per Session: Not on file   Stress: No Stress Concern Present (5/16/2024)    [employer] [place] [employer] - Occupational Stress Questionnaire     Feeling of Stress : Not at all   Social Connections: Unknown (5/16/2024)    Social Connection and Isolation Panel [NHANES]     Frequency of Communication with Friends and Family: Not on file     Frequency of Social Gatherings with Friends and Family: Twice a week     Attends Orthodox Services: Not on file     Active Member of Clubs or Organizations: Not on file     Attends Club or Organization Meetings: Not on file     Marital Status: Not on file   Interpersonal Safety: Low Risk  (12/1/2023)     "Interpersonal Safety     Do you feel physically and emotionally safe where you currently live?: Yes     Within the past 12 months, have you been hit, slapped, kicked or otherwise physically hurt by someone?: No     Within the past 12 months, have you been humiliated or emotionally abused in other ways by your partner or ex-partner?: No   Housing Stability: High Risk (5/16/2024)    Housing Stability     Do you have housing? : No     Are you worried about losing your housing?: No     Review of Systems:  Focused cardiovascular and respiratory review of systems is negative other than the symptoms noted above in the HPI.     Physical Exam:  Vitals: /68 (BP Location: Right arm, Patient Position: Sitting, Cuff Size: Adult Regular)   Pulse 87   Ht 1.753 m (5' 9\")   Wt 75 kg (165 lb 4.8 oz)   SpO2 95%   BMI 24.41 kg/m     Wt Readings from Last 4 Encounters:   07/12/24 75 kg (165 lb 4.8 oz)   05/28/24 76.7 kg (169 lb)   05/16/24 77.8 kg (171 lb 8 oz)   05/04/24 83.3 kg (183 lb 10.3 oz)     Constitutional: Alert and in no acute distress.  Neck: No JVD.  Cardiovascular: Irregularly irregular rhythm, normal rate.  No murmur, rub or gallop.   Respiratory: Breathing non-labored. Lungs are clear to auscultation with no wheezes or crackles bilaterally.  Gastrointestinal: Abdomen non-distended.  Extremities: Trace nonpitting lower extremity edema in the ankles bilaterally. Venous stasis changes noted on the shins bilaterally.  Neuropsychiatric: No gross focal deficits. Affect appropriate. Mentation normal.     Recent Lab Results:  LIPID RESULTS:  Lab Results   Component Value Date    TRIG 66 02/13/2023     LIVER ENZYME RESULTS:  Lab Results   Component Value Date    AST 37 05/04/2024    ALT 26 05/04/2024     CBC RESULTS:  Lab Results   Component Value Date    WBC 3.4 (L) 05/04/2024    WBC 7.0 10/06/2012    RBC 4.06 05/04/2024    RBC 4.74 10/06/2012    HGB 11.7 05/04/2024    HGB 14.8 10/06/2012    HCT 36.6 05/04/2024    HCT " 42.3 10/06/2012    MCV 90 05/04/2024    MCV 89 10/06/2012    MCH 28.8 05/04/2024    MCH 31.2 10/06/2012    MCHC 32.0 05/04/2024    MCHC 35.0 10/06/2012    RDW 15.9 (H) 05/04/2024    RDW 13.0 10/06/2012     (L) 05/04/2024     10/06/2012     BMP RESULTS:  Lab Results   Component Value Date     (L) 06/28/2024     10/07/2012    POTASSIUM 4.1 06/28/2024    POTASSIUM 3.6 10/07/2012    CHLORIDE 96 (L) 06/28/2024    CHLORIDE 100 04/29/2024    CHLORIDE 106 10/07/2012    CO2 24 06/28/2024    CO2 25 10/07/2012    ANIONGAP 11 06/28/2024    ANIONGAP 4 (L) 10/07/2012    GLC 94 06/28/2024    GLC 88 10/07/2012    BUN 19.3 06/28/2024    BUN 10 10/07/2012    CR 0.77 06/28/2024    CR 0.67 10/07/2012    GFRESTIMATED 78 06/28/2024    GFRESTIMATED 88 10/07/2012    GFRESTBLACK >90 10/07/2012    SARAH 9.7 06/28/2024    SARAH 8.7 10/07/2012     CC  Talat Medrano MD  [id number]  [place] [id number]    Please kindly note that this document was completed in part using Dragon voice recognition software. Although reviewed after completion, some word substitutions and typographical errors may occur. Please contact me if clarification is needed.

## 2024-07-12 NOTE — LETTER
7/12/2024    Christy Bunn MD  [address]  [facility] [address]    RE: Mercedes Nelson       Dear Colleague,     I had the pleasure of seeing Mercedes Nelson in the [facility].    Cardiology Clinic Progress Note    Service Date: July 12, 2024  Primary Cardiology Team: Dr. Muhammad/    HISTORY OF PRESENT ILLNESS:  I had the pleasure of seeing Ms. Mercedes Nelson in the clinic today. She is a very pleasant 78 year old female with a past medical history notable for labile hypertension, hyperlipidemia, hypokalemia, dependent edema, recently diagnosed persistent atrial fibrillation, and chronic HFpEF.  She has previously followed with Dr. Muhammad for cardiology care and more recently has met with Dr. Medrano last few times in the clinic after meeting with him urgently in May of this year for newly diagnosed atrial fibrillation.  She was seen by her primary care team in the spring due to issues with some coughing and a chest x-ray showed enlarged cardiac silhouette.  She subsequently underwent an echocardiogram on 4/30/2024 and she was found to be in atrial fibrillation at that time with low normal EF 50-55%, moderate-severe biatrial enlargement, mild to moderate (1-2+) mitral regurgitation, mild to moderate (1-2+) tricuspid regurgitation. Ascending aorta is borderline dilated at 3.8 cm.    Lexiscan nuclear stress test was subsequently completed which appears to be negative for ischemia. A 7-day Zio patch showing average heart rate of 88 bpm ranging from . An aldosterone renin ratio came back elevated at 44, so an endocrinology consult was placed with an appointment setup for December.     The patient has been completely unaware of her atrial fibrillation without any sensation of palpitations or heart racing.  She last met with Dr. Medrano in the clinic on 5/28/2024 and was doing relatively well from a cardiac standpoint, but did complain of some  increased peripheral edema.  She otherwise was minimally symptomatic despite N-terminal proBNP level being elevated at 2145.  She was instructed to try increasing spironolactone from 25 mg to 50 mg once daily for better blood pressure control and heart failure management.    Labs were checked on 6/28/2024 with a basic metabolic panel showing mild hyponatremia with sodium level of 131, otherwise stable electrolytes and renal function with potassium level 4.1 and creatinine at 0.77.  Another NT pro BNP level was rechecked and was about the same at 2272.    A cardiac MRI was subsequently completed on 7/11/24 showing. Mildly reduced LV and RV systolic functions with LVEF of 53%, small pericardial effusion, and late gadolinium enhancement imaging showing no MI, fibrosis or infiltrative disease. Renal cysts were also incidentally noted on the report.    Today, Ms. Nelson presents to the clinic accompanied by her  and daughter in follow up of her recent medication change and cardiac MRI.  She tells me that she has been feeling a bit better over the last few weeks with the higher dose of spironolactone.  Blood pressure has remained labile and her checks at home, but overall seems to be under better control.  Her weight has trended down around 5-10 pounds over the past couple of months to 165 pounds on the clinic scale today.  She feels her lower extremity edema has improved as well.  She otherwise denies any symptoms of palpitations, heart racing, dizziness, or lightheadedness.  She has not had chest pain or any significant exertional dyspnea.      She notes that she had a very difficult winter with a mechanical fall and right femur fracture, as well as later COVID infection which she feels that her back a lot.  She now feels like she is finally turning a corner and has regained some of her strength and stamina.  She has been walking more this summer and feels that she is tolerating this well without significant  limitations.    ASSESSMENT:  1. Chronic heart failure with preserved ejection fraction (HFpEF).  Appears well compensated and near euvolemic on exam.  2. Persistent atrial fibrillation, minimally symptomatic. Rate controlled with carvedilol 25 mg twice daily and anticoagulated with Eliquis 5 mg twice daily.  3. Labile hypertension with improvement in blood pressure control following the increase in her spironolactone dose recently.  4. Hyperaldosteronism, referral placed to meet with endocrinology and appointment set for next December.  5. Small pericardial effusion incidentally noted on recent cardiac MRI.    PLAN:  - Continue current cardiac regimen without changes.  - Counseled on continuing to check daily weights, trying to stick to a low-sodium diet, monitoring for signs/symptoms of fluid retention. Reviewed when to call the clinic.  - Recommend a repeat echocardiogram in 1-2 months for reassessment of the small pericardial effusion incidentally noted on the cardiac MRI.  - Follow-up with Dr. Medrano as previously planned in October with labs beforehand for repeat BMP and NT pro BNP level.    Thank you for the opportunity to participate in this pleasant patient's care. We would be happy to see her sooner if needed for any concerns in the meantime.    40 total minutes was spent today including chart review, precharting, history and exam, post visit documentation, and reviewing studies as outlined above.     LYNDSAY Liu, CNP   Nurse Practitioner  [facility]  Pager: [phone]  Text Page  (8am - 5pm, M-F)    Orders this Visit:  Orders Placed This Encounter   Procedures    Echocardiogram Complete     No orders of the defined types were placed in this encounter.    There are no discontinued medications.  Encounter Diagnoses   Name Primary?    Chronic heart failure with preserved ejection fraction (HFpEF) (H) Yes    Persistent atrial fibrillation (H)     Labile hypertension      Hyperaldosteronism (H24)     Essential hypertension     SOB (shortness of breath)     Peripheral edema     PAF (paroxysmal atrial fibrillation) (H)     Hypokalemia     Acute on chronic diastolic congestive heart failure (H)     Pericardial effusion        CURRENT MEDICATIONS:  Current Outpatient Medications   Medication Sig Dispense Refill    acetaminophen (TYLENOL) 325 MG tablet Take 2 tablets (650 mg) by mouth every 4 hours as needed for other (For optimal non-opioid multimodal pain management to improve pain control.) 30 tablet 0    albuterol (PROAIR HFA/PROVENTIL HFA/VENTOLIN HFA) 108 (90 Base) MCG/ACT inhaler Inhale 2 puffs into the lungs every 6 hours as needed for shortness of breath, wheezing or cough 18 g 0    apixaban ANTICOAGULANT (ELIQUIS ANTICOAGULANT) 5 MG tablet Take 1 tablet (5 mg) by mouth 2 times daily 180 tablet 4    carvedilol (COREG) 25 MG tablet Take 1 tablet (25 mg) by mouth 2 times daily (with meals) 180 tablet 4    clobetasol (TEMOVATE) 0.05 % external ointment Apply thin layer to affected area daily      irbesartan (AVAPRO) 75 MG tablet Take 1 tablet by mouth daily at 2 pm      Multiple Vitamin (MULTI-VITAMIN) per tablet Take 1 tablet by mouth daily      prednisoLONE acetate (PRED FORTE) 1 % ophthalmic suspension Place 1 drop into both eyes 4 times daily      rosuvastatin (CRESTOR) 20 MG tablet Take 20 mg by mouth daily      spironolactone (ALDACTONE) 50 MG tablet Take 1 tablet (50 mg) by mouth daily 90 tablet 4    VITAMIN D, CHOLECALCIFEROL, PO Take 1 capsule by mouth daily      B Complex Vitamins (VITAMIN  B COMPLEX) tablet Take 1 tablet by mouth daily (Patient not taking: Reported on 5/28/2024)      Coenzyme Q10 (COQ-10 PO) Take 1 tablet by mouth daily (Patient not taking: Reported on 5/28/2024)         ALLERGIES  Allergies   Allergen Reactions    Dust Mites Other (See Comments)    Iodine-131 Angioedema, Hives and Itching       PAST MEDICAL, SURGICAL, FAMILY HISTORY:  History was  reviewed and updated as needed, see medical record.    SOCIAL HISTORY:  Social History     Socioeconomic History    Marital status:      Spouse name: Not on file    Number of children: Not on file    Years of education: Not on file    Highest education level: Not on file   Occupational History    Not on file   Tobacco Use    Smoking status: Never    Smokeless tobacco: Never   Substance and Sexual Activity    Alcohol use: No    Drug use: Not on file    Sexual activity: Not on file   Other Topics Concern    Not on file   Social History Narrative    Not on file     Social Determinants of Health     Financial Resource Strain: Low Risk  (5/16/2024)    Financial Resource Strain     Within the past 12 months, have you or your family members you live with been unable to get utilities (heat, electricity) when it was really needed?: No   Food Insecurity: Low Risk  (5/16/2024)    Food Insecurity     Within the past 12 months, did you worry that your food would run out before you got money to buy more?: No     Within the past 12 months, did the food you bought just not last and you didn t have money to get more?: No   Transportation Needs: Low Risk  (5/16/2024)    Transportation Needs     Within the past 12 months, has lack of transportation kept you from medical appointments, getting your medicines, non-medical meetings or appointments, work, or from getting things that you need?: No   Physical Activity: Unknown (5/16/2024)    Exercise Vital Sign     Days of Exercise per Week: 3 days     Minutes of Exercise per Session: Not on file   Stress: No Stress Concern Present (5/16/2024)    [employer] - Occupational Stress Questionnaire     Feeling of Stress : Not at all   Social Connections: Unknown (5/16/2024)    Social Connection and Isolation Panel [NHANES]     Frequency of Communication with Friends and Family: Not on file     Frequency of Social Gatherings with Friends and Family: Twice a week  "    Attends Rastafarian Services: Not on file     Active Member of Clubs or Organizations: Not on file     Attends Club or Organization Meetings: Not on file     Marital Status: Not on file   Interpersonal Safety: Low Risk  (12/1/2023)    Interpersonal Safety     Do you feel physically and emotionally safe where you currently live?: Yes     Within the past 12 months, have you been hit, slapped, kicked or otherwise physically hurt by someone?: No     Within the past 12 months, have you been humiliated or emotionally abused in other ways by your partner or ex-partner?: No   Housing Stability: High Risk (5/16/2024)    Housing Stability     Do you have housing? : No     Are you worried about losing your housing?: No     Review of Systems:  Focused cardiovascular and respiratory review of systems is negative other than the symptoms noted above in the HPI.     Physical Exam:  Vitals: /68 (BP Location: Right arm, Patient Position: Sitting, Cuff Size: Adult Regular)   Pulse 87   Ht 1.753 m (5' 9\")   Wt 75 kg (165 lb 4.8 oz)   SpO2 95%   BMI 24.41 kg/m     Wt Readings from Last 4 Encounters:   07/12/24 75 kg (165 lb 4.8 oz)   05/28/24 76.7 kg (169 lb)   05/16/24 77.8 kg (171 lb 8 oz)   05/04/24 83.3 kg (183 lb 10.3 oz)     Constitutional: Alert and in no acute distress.  Neck: No JVD.  Cardiovascular: Irregularly irregular rhythm, normal rate.  No murmur, rub or gallop.   Respiratory: Breathing non-labored. Lungs are clear to auscultation with no wheezes or crackles bilaterally.  Gastrointestinal: Abdomen non-distended.  Extremities: Trace nonpitting lower extremity edema in the ankles bilaterally. Venous stasis changes noted on the shins bilaterally.  Neuropsychiatric: No gross focal deficits. Affect appropriate. Mentation normal.     Recent Lab Results:  LIPID RESULTS:  Lab Results   Component Value Date    TRIG 66 02/13/2023     LIVER ENZYME RESULTS:  Lab Results   Component Value Date    AST 37 05/04/2024    ALT " 26 05/04/2024     CBC RESULTS:  Lab Results   Component Value Date    WBC 3.4 (L) 05/04/2024    WBC 7.0 10/06/2012    RBC 4.06 05/04/2024    RBC 4.74 10/06/2012    HGB 11.7 05/04/2024    HGB 14.8 10/06/2012    HCT 36.6 05/04/2024    HCT 42.3 10/06/2012    MCV 90 05/04/2024    MCV 89 10/06/2012    MCH 28.8 05/04/2024    MCH 31.2 10/06/2012    MCHC 32.0 05/04/2024    MCHC 35.0 10/06/2012    RDW 15.9 (H) 05/04/2024    RDW 13.0 10/06/2012     (L) 05/04/2024     10/06/2012     BMP RESULTS:  Lab Results   Component Value Date     (L) 06/28/2024     10/07/2012    POTASSIUM 4.1 06/28/2024    POTASSIUM 3.6 10/07/2012    CHLORIDE 96 (L) 06/28/2024    CHLORIDE 100 04/29/2024    CHLORIDE 106 10/07/2012    CO2 24 06/28/2024    CO2 25 10/07/2012    ANIONGAP 11 06/28/2024    ANIONGAP 4 (L) 10/07/2012    GLC 94 06/28/2024    GLC 88 10/07/2012    BUN 19.3 06/28/2024    BUN 10 10/07/2012    CR 0.77 06/28/2024    CR 0.67 10/07/2012    GFRESTIMATED 78 06/28/2024    GFRESTIMATED 88 10/07/2012    GFRESTBLACK >90 10/07/2012    SARAH 9.7 06/28/2024    SARAH 8.7 10/07/2012     CC  Talat Medrano MD  [address]  [place] [id number]    Please kindly note that this document was completed in part using Dragon voice recognition software. Although reviewed after completion, some word substitutions and typographical errors may occur. Please contact me if clarification is needed.       Thank you for allowing me to participate in the care of your patient.      Sincerely,     Christian Simms NP     [facility]

## 2024-08-12 ENCOUNTER — ANCILLARY PROCEDURE (OUTPATIENT)
Dept: GENERAL RADIOLOGY | Facility: CLINIC | Age: 79
End: 2024-08-12
Attending: INTERNAL MEDICINE
Payer: COMMERCIAL

## 2024-08-12 DIAGNOSIS — J18.9 PNEUMONIA DUE TO INFECTIOUS ORGANISM, UNSPECIFIED LATERALITY, UNSPECIFIED PART OF LUNG: ICD-10-CM

## 2024-08-12 PROCEDURE — 71046 X-RAY EXAM CHEST 2 VIEWS: CPT | Mod: TC | Performed by: RADIOLOGY

## 2024-08-12 NOTE — RESULT ENCOUNTER NOTE
Nina Long    This is to inform you regarding your test result.    Your chest XR shows improvement   Great news    Sincerely,      Dr.Nasima Sepideh MD,FACP

## 2024-08-16 ENCOUNTER — OFFICE VISIT (OUTPATIENT)
Dept: FAMILY MEDICINE | Facility: CLINIC | Age: 79
End: 2024-08-16
Payer: COMMERCIAL

## 2024-08-16 VITALS
TEMPERATURE: 97.7 F | WEIGHT: 163 LBS | HEART RATE: 75 BPM | DIASTOLIC BLOOD PRESSURE: 77 MMHG | OXYGEN SATURATION: 98 % | SYSTOLIC BLOOD PRESSURE: 116 MMHG | HEIGHT: 69 IN | RESPIRATION RATE: 16 BRPM | BODY MASS INDEX: 24.14 KG/M2

## 2024-08-16 DIAGNOSIS — E87.1 HYPONATREMIA: ICD-10-CM

## 2024-08-16 DIAGNOSIS — Z87.01 HISTORY OF PNEUMONIA: ICD-10-CM

## 2024-08-16 DIAGNOSIS — I48.0 PAF (PAROXYSMAL ATRIAL FIBRILLATION) (H): Primary | ICD-10-CM

## 2024-08-16 DIAGNOSIS — R93.89 ABNORMAL CHEST CT: ICD-10-CM

## 2024-08-16 DIAGNOSIS — I50.30 HEART FAILURE WITH PRESERVED EJECTION FRACTION, NYHA CLASS I (H): ICD-10-CM

## 2024-08-16 DIAGNOSIS — I10 BENIGN ESSENTIAL HYPERTENSION: ICD-10-CM

## 2024-08-16 DIAGNOSIS — Z23 NEED FOR VACCINATION: ICD-10-CM

## 2024-08-16 DIAGNOSIS — D64.9 ANEMIA, UNSPECIFIED TYPE: ICD-10-CM

## 2024-08-16 DIAGNOSIS — E78.5 DYSLIPIDEMIA: ICD-10-CM

## 2024-08-16 LAB
ANION GAP SERPL CALCULATED.3IONS-SCNC: 10 MMOL/L (ref 7–15)
BUN SERPL-MCNC: 23.5 MG/DL (ref 8–23)
CALCIUM SERPL-MCNC: 9.9 MG/DL (ref 8.8–10.4)
CHLORIDE SERPL-SCNC: 96 MMOL/L (ref 98–107)
CHOLEST SERPL-MCNC: 136 MG/DL
CREAT SERPL-MCNC: 0.88 MG/DL (ref 0.51–0.95)
EGFRCR SERPLBLD CKD-EPI 2021: 66 ML/MIN/1.73M2
FASTING STATUS PATIENT QL REPORTED: NO
FASTING STATUS PATIENT QL REPORTED: NO
GLUCOSE SERPL-MCNC: 76 MG/DL (ref 70–99)
HCO3 SERPL-SCNC: 24 MMOL/L (ref 22–29)
HDLC SERPL-MCNC: 77 MG/DL
HGB BLD-MCNC: 13.4 G/DL (ref 11.7–15.7)
LDLC SERPL CALC-MCNC: 47 MG/DL
NONHDLC SERPL-MCNC: 59 MG/DL
POTASSIUM SERPL-SCNC: 4.7 MMOL/L (ref 3.4–5.3)
SODIUM SERPL-SCNC: 130 MMOL/L (ref 135–145)
TRIGL SERPL-MCNC: 62 MG/DL

## 2024-08-16 PROCEDURE — G0009 ADMIN PNEUMOCOCCAL VACCINE: HCPCS | Performed by: INTERNAL MEDICINE

## 2024-08-16 PROCEDURE — 85018 HEMOGLOBIN: CPT | Performed by: INTERNAL MEDICINE

## 2024-08-16 PROCEDURE — 99214 OFFICE O/P EST MOD 30 MIN: CPT | Mod: 25 | Performed by: INTERNAL MEDICINE

## 2024-08-16 PROCEDURE — 80048 BASIC METABOLIC PNL TOTAL CA: CPT | Performed by: INTERNAL MEDICINE

## 2024-08-16 PROCEDURE — 36415 COLL VENOUS BLD VENIPUNCTURE: CPT | Performed by: INTERNAL MEDICINE

## 2024-08-16 PROCEDURE — 90677 PCV20 VACCINE IM: CPT | Performed by: INTERNAL MEDICINE

## 2024-08-16 PROCEDURE — 80061 LIPID PANEL: CPT | Performed by: INTERNAL MEDICINE

## 2024-08-16 ASSESSMENT — PAIN SCALES - GENERAL: PAINLEVEL: NO PAIN (0)

## 2024-08-16 NOTE — PROGRESS NOTES
Assessment & Plan     Patient accompanied by  and daughter.    PAF (paroxysmal atrial fibrillation) (H)  On Eliquis  Heart rate is under control    Dyslipidemia  Reordered lipid panel today  - Lipid panel reflex to direct LDL Non-fasting    Abnormal chest CT  08/12/2024 - Chest XR - [facility]   Had a follow-up chest XR d/t pneumonia.  IMPRESSION: Previously noted ill-defined opacities at the right lung  base shows improvement. No acute airspace disease identified. Stable  cardiomegaly.  Repeat chest x-ray showed improvement and she is improved symptomatically    Benign essential hypertension  Under control   Blood pressure home readings have been generally normal.   Taking Spironolactone 50 mg daily    Hyponatremia which could be due to hyponatremia.name    Labs ordered   - Basic metabolic panel  (Ca, Cl, CO2, Creat, Gluc, K, Na, BUN)    Anemia, unspecified type  Labs ordered  - Hemoglobin    Heart failure with preserved ejection fraction, NYHA class I (H)  Recently saw Dr. Simms for follow-up cardiac testing; referral from Dr. Medrano.   He referred her to Endocrinology.    History of pneumonia  She has fully recovered from pneumonia    Other  She will get the Pneumonia shot today  Declined COVID booster today. Counseled patient to get the booster shot as she is considered high-risk, given her medical hx  She understands she should get the flu shot in October.   She is unsure about getting Shingles vaccine. Daughter and I counseled her to get it to prevent her from pk Shingles vaccine.     Marcelino Long is a 78 year old, presenting for the following health issues:  Recheck Medication         No data to display              History of Present Illness       Heart Failure:  She presents for follow up of heart failure. She is not experiencing shortness of breath at night, with rest or with activity  She is experiencing lower extremity edema which is better than usual.   She  denies orthopenea and is not coughing at night. Patient is checking weight daily. She has recently had a None.  She has no side effects from medications.  She has had no other medical visits for heart failure since the last visit.    Hyperlipidemia:  She presents for follow up of hyperlipidemia.   She is taking medication to lower cholesterol. She is not having myalgia or other side effects to statin medications.    Hypertension: She presents for follow up of hypertension.  She does not check blood pressure  regularly outside of the clinic. Outpatient blood pressures have not been over 140/90. She does not follow a low salt diet.     Vascular Disease:  She presents for follow up of vascular disease.     She never takes nitroglycerin. She is not taking daily aspirin.    She eats 4 or more servings of fruits and vegetables daily.She consumes 1 sweetened beverage(s) daily.She exercises with enough effort to increase her heart rate 20 to 29 minutes per day.  She exercises with enough effort to increase her heart rate 3 or less days per week.   She is taking medications regularly.     Last Echo:   Echo result w/o MOPS: Interpretation Summary The visual ejection fraction is 50-55%.There is mod-severe biatrial enlargement.There is mild to moderate (1-2+) mitral regurgitation.There is mild to moderate (1-2+) tricuspid regurgitation.Ascending aorta dilatation is present.Dilation of the inferior vena cava is present with abnormal respiratoryvariation in diameter.Mild (35-45mmHg) pulmonary hypertension is present.The rhythm was atrial fibrillation.Compared to prior study, changes are noted.          Review of Systems  Constitutional, HEENT, cardiovascular, pulmonary, GI, , musculoskeletal, neuro, skin, endocrine and psych systems are negative, except as otherwise noted.      Objective    /77 (BP Location: Right arm, Patient Position: Sitting, Cuff Size: Adult Regular)   Pulse 75   Temp 97.7  F (36.5  C) (Oral)    "Resp 16   Ht 1.753 m (5' 9\")   Wt 73.9 kg (163 lb)   LMP  (LMP Unknown)   SpO2 98%   Breastfeeding No   BMI 24.07 kg/m    Body mass index is 24.07 kg/m .  Physical Exam     GENERAL APPEARANCE: healthy, alert and no distress  EYES: Eyes grossly normal to inspection, PERRL and conjunctivae and sclerae normal  HENT: ear canals and TM's normal and nose and mouth without ulcers or lesions  NECK: no adenopathy  RESP: lungs clear to auscultation - no rales, rhonchi or wheezes  CV: regular rates and rhythm, normal S1 S2, no S3          Signed Electronically by: Christy Bunn MD    This document serves as a record of the services and decisions personally performed and made by Dr. Bunn. It was created on her behalf by Alanna Hewitt, a trained medical scribe. The creation of this document is based the provider's statements to the medical scribe.   "

## 2024-08-16 NOTE — PATIENT INSTRUCTIONS
Labs today  Follow up in 4 months.  Seek sooner medical attention if there is any worsening of symptoms or problems.

## 2024-08-18 NOTE — RESULT ENCOUNTER NOTE
Nina Long    This is to inform you regarding your test result.    Sodium is slightly low but stable  Recheck in one month  Your total cholesterol is normal.  HDL which is called good cholesterol is normal.  Your LDL cholesterol is normal.  This is often call bad cholesterol and high levels increase the risk for heart attacks and strokes.  Your triglycerides are normal.  Hemoglobin level is normal.        Sincerely,      Dr.Nasima Sepideh MD,FACP

## 2024-09-05 ENCOUNTER — HOSPITAL ENCOUNTER (OUTPATIENT)
Dept: CARDIOLOGY | Facility: CLINIC | Age: 79
Discharge: HOME | End: 2024-09-05
Attending: NURSE PRACTITIONER | Admitting: NURSE PRACTITIONER
Payer: COMMERCIAL

## 2024-09-05 DIAGNOSIS — I50.32 CHRONIC HEART FAILURE WITH PRESERVED EJECTION FRACTION (HFPEF) (H): ICD-10-CM

## 2024-09-05 DIAGNOSIS — I31.39 PERICARDIAL EFFUSION: ICD-10-CM

## 2024-09-05 DIAGNOSIS — I48.19 PERSISTENT ATRIAL FIBRILLATION (H): ICD-10-CM

## 2024-09-05 DIAGNOSIS — R09.89 LABILE HYPERTENSION: ICD-10-CM

## 2024-09-05 LAB — LVEF ECHO: NORMAL

## 2024-09-05 PROCEDURE — 93306 TTE W/DOPPLER COMPLETE: CPT | Mod: 26 | Performed by: INTERNAL MEDICINE

## 2024-09-05 PROCEDURE — 93306 TTE W/DOPPLER COMPLETE: CPT

## 2024-09-06 ENCOUNTER — TELEPHONE (OUTPATIENT)
Dept: CARDIOLOGY | Facility: CLINIC | Age: 79
End: 2024-09-06
Payer: COMMERCIAL

## 2024-09-06 NOTE — TELEPHONE ENCOUNTER
"Team received information from Garrick Simms, who has reviewed most recent Echo.  \"... echocardiogram overall look stable in comparison to the most recent check. The previously noted small pericardial effusion from her cardiac MRI in July is no longer noted and appears to have resolved, which is good news. No need for changes and she can follow-up as planned with Dr. Medrano in October. Thank you! \"    Writer placed call to Renée SHAW and sent voicemail.      "

## 2024-10-23 ENCOUNTER — OFFICE VISIT (OUTPATIENT)
Dept: CARDIOLOGY | Facility: CLINIC | Age: 79
End: 2024-10-23
Attending: INTERNAL MEDICINE
Payer: COMMERCIAL

## 2024-10-23 ENCOUNTER — LAB (OUTPATIENT)
Dept: LAB | Facility: CLINIC | Age: 79
End: 2024-10-23
Payer: COMMERCIAL

## 2024-10-23 VITALS
SYSTOLIC BLOOD PRESSURE: 134 MMHG | BODY MASS INDEX: 24.23 KG/M2 | DIASTOLIC BLOOD PRESSURE: 78 MMHG | HEART RATE: 84 BPM | HEIGHT: 69 IN | WEIGHT: 163.6 LBS

## 2024-10-23 DIAGNOSIS — E87.1 HYPONATREMIA: ICD-10-CM

## 2024-10-23 DIAGNOSIS — R09.89 LABILE HYPERTENSION: ICD-10-CM

## 2024-10-23 DIAGNOSIS — I50.33 ACUTE ON CHRONIC DIASTOLIC CONGESTIVE HEART FAILURE (H): ICD-10-CM

## 2024-10-23 DIAGNOSIS — R60.0 PERIPHERAL EDEMA: ICD-10-CM

## 2024-10-23 DIAGNOSIS — I48.0 PAF (PAROXYSMAL ATRIAL FIBRILLATION) (H): ICD-10-CM

## 2024-10-23 DIAGNOSIS — R06.02 SOB (SHORTNESS OF BREATH): ICD-10-CM

## 2024-10-23 DIAGNOSIS — I50.33 ACUTE ON CHRONIC DIASTOLIC CONGESTIVE HEART FAILURE (H): Primary | ICD-10-CM

## 2024-10-23 DIAGNOSIS — E87.6 HYPOKALEMIA: ICD-10-CM

## 2024-10-23 LAB
ANION GAP SERPL CALCULATED.3IONS-SCNC: 10 MMOL/L (ref 7–15)
BUN SERPL-MCNC: 20.2 MG/DL (ref 8–23)
CALCIUM SERPL-MCNC: 9.9 MG/DL (ref 8.8–10.4)
CHLORIDE SERPL-SCNC: 93 MMOL/L (ref 98–107)
CREAT SERPL-MCNC: 0.96 MG/DL (ref 0.51–0.95)
EGFRCR SERPLBLD CKD-EPI 2021: 60 ML/MIN/1.73M2
GLUCOSE SERPL-MCNC: 98 MG/DL (ref 70–99)
HCO3 SERPL-SCNC: 24 MMOL/L (ref 22–29)
NT-PROBNP SERPL-MCNC: 2038 PG/ML (ref 0–1800)
POTASSIUM SERPL-SCNC: 4.8 MMOL/L (ref 3.4–5.3)
SODIUM SERPL-SCNC: 127 MMOL/L (ref 135–145)

## 2024-10-23 PROCEDURE — 99215 OFFICE O/P EST HI 40 MIN: CPT | Performed by: INTERNAL MEDICINE

## 2024-10-23 PROCEDURE — 83880 ASSAY OF NATRIURETIC PEPTIDE: CPT | Performed by: INTERNAL MEDICINE

## 2024-10-23 PROCEDURE — 36415 COLL VENOUS BLD VENIPUNCTURE: CPT | Performed by: INTERNAL MEDICINE

## 2024-10-23 PROCEDURE — 80048 BASIC METABOLIC PNL TOTAL CA: CPT | Performed by: INTERNAL MEDICINE

## 2024-10-23 RX ORDER — SPIRONOLACTONE 25 MG/1
25 TABLET ORAL AT BEDTIME
Qty: 90 TABLET | Refills: 4 | Status: SHIPPED
Start: 2024-10-23 | End: 2024-10-31

## 2024-10-23 NOTE — PROGRESS NOTES
HPI and Plan:   Mercedes is a very nice 78-year-old woman who follows with my partner Dr. Best Muhammad.  I met her urgently in May because of newly diagnosed atrial fibrillation.     She has longstanding labile hypertension, hypercholesterolemia, hypokalemia, and dependent edema with varicose veins.  In December 2023 she was seen in the ER for her labile hypertension.  Later in December she again was seen after mechanical fall resulting in a fracture of her right femur.  On all these visits and previous event monitoring she is not been found to have A-fib although there was suspicion that she may have it.     This spring she noted she had increased cough.  She has worsening peripheral edema left greater than right and PND.     She was seen in her primary care office where chest x-ray demonstrated enlarged cardiac silhouette.  Subsequent echocardiogram noted atrial fibrillation.  She is completely unaware of her atrial fibrillation.     Family history is significant for mother dying at age 94.  Her father had coronary disease at age 63.  She is a lifelong non-smoker.  She does not drink alcohol.  She does not appear to have diabetes although some sugars are elevated possibly impaired fasting glucose versus nonfasting blood draws.     She denies exertional chest arm neck jaw or shoulder discomfort.  She has no lightheadedness dizziness syncope or near syncope.  She states she may be aware of her rhythm when she lies down at nighttime.       An April echocardiogram demonstrates ejection fraction of 50 to 55% with moderate concentric left ventricular hypertrophy she has mild to moderate mitral regurgitation and tricuspid regurgitation.  Ascending aorta is borderline dilated at 3.8 cm.  IVC is described as dilated with decreased respiratory variation.  Pulmonary pressures are estimated to be 19 mmHg plus right atrial pressure     Review of the chart shows normal CBC.  She had an N-terminal proBNP of 3644.  Glucose is  elevated at 126.  Most recent potassium is 3.3 EKG demonstrates atrial fibrillation with a rate of 85 with left anterior fascicular block.     Workup to date includes a Lexiscan which appears to be negative for ischemia.  A 7-day Zio patch showing average heart rate of 88 ranging from .  I also checked an aldosterone renin ratio that came back at 44.  An endocrinology consult has been placed with an appointment for December.  I have been treating her blood pressure and peripheral edema with spironolactone last time increasing to 50 mg daily.    She returns to the clinic at this time stating she feels weak and fatigued.  Of note she has disrupted sleep at nighttime and is up multiple times.  She does not think it is getting up to go to the bathroom.  She has not scheduled a sleep study yet.     Assessment and plan.  Olga has no symptoms to suggest ischemia and this is supported by her Lexiscan     Home blood pressures are very well-controlled.  It is high today in the office.  Unfortunately the increase in spironolactone appears to have triggered hyponatremia.  I recommended she cut back to 25 mg daily.  I will have her return in 1 month to see my FRANKLIN and recheck a BMP.     Heart failure appears a bit improved with an N-terminal proBNP of 2038..  Down the road we consider adding Jardiance to her regimen.     We talked about her peripheral edema, the importance of a low-salt diet, the importance of diet high in fresh fruits and vegetables to increase her potassium and magnesium.  We also talked about the importance of elevating her legs.  We also talked about the importance of exercise and the concept of dependent edema and non mobile elderly.  Fortunately this may worsen again at a lower dose of spironolactone.  It looks well-controlled today     She does have hyper Manuel/renin level.  She tells me endocrinology is now pushed her back to January.    I think a lot of her symptoms are daytime somnolence fatigue  and tiredness due to her disrupted sleep at nighttime.  I have encouraged her to go ahead and schedule her sleep study.       I will also schedule a stress MRI given her mildly decreased left ventricular function.  I assume this is all longstanding poorly treated high blood pressure and diastolic dysfunction.  I will do the MRI to make sure we do not also have an infiltrative cardiomyopathy     A-fib rate appears to be adequately controlled with the change to carvedilol.     Her MRI demonstrated mildly reduced RV and LV function with LVEF of 53%.  Small pericardial effusion.  Late gadolinium enhancement showed no MI, fibrosis or infiltrative disease.    Her  asks if she needs a pacemaker and we reviewed her previous Zio Patch.  Her  also asks if this is not anemia and she needs a blood transfusion.  Most recent hemoglobin was back to 13.4.     Thank you for allow me to participate in this patient's care.  Sincerely,                                Talat Medrano MD [facility]    Today's clinic visit entailed:  Review of the result(s) of each unique test - lab work, MRI  Ordering of each unique test  Prescription drug management  40 minutes spent by me on the date of the encounter doing chart review, history and exam, documentation and further activities per the note  Provider  Link to MDM Help Grid     The level of medical decision making during this visit was of moderate complexity.      Orders Placed This Encounter   Procedures    Basic metabolic panel    Follow-Up with Cardiology FRANKLIN       Orders Placed This Encounter   Medications    KRILL OIL PO     Sig: Take by mouth every other day.    spironolactone (ALDACTONE) 25 MG tablet     Sig: Take 1 tablet (25 mg) by mouth at bedtime.     Dispense:  90 tablet     Refill:  4       Medications Discontinued During This Encounter   Medication Reason    albuterol (PROAIR HFA/PROVENTIL HFA/VENTOLIN HFA) 108 (90 Base) MCG/ACT inhaler     spironolactone (ALDACTONE)  50 MG tablet     B Complex Vitamins (VITAMIN  B COMPLEX) tablet     prednisoLONE acetate (PRED FORTE) 1 % ophthalmic suspension          Encounter Diagnoses   Name Primary?    SOB (shortness of breath)     Peripheral edema     PAF (paroxysmal atrial fibrillation) (H)     Hypokalemia     Labile hypertension     Acute on chronic diastolic congestive heart failure (H) Yes    Hyponatremia        CURRENT MEDICATIONS:  Current Outpatient Medications   Medication Sig Dispense Refill    acetaminophen (TYLENOL) 325 MG tablet Take 2 tablets (650 mg) by mouth every 4 hours as needed for other (For optimal non-opioid multimodal pain management to improve pain control.) 30 tablet 0    apixaban ANTICOAGULANT (ELIQUIS ANTICOAGULANT) 5 MG tablet Take 1 tablet (5 mg) by mouth 2 times daily 180 tablet 4    carvedilol (COREG) 25 MG tablet Take 1 tablet (25 mg) by mouth 2 times daily (with meals) 180 tablet 4    clobetasol (TEMOVATE) 0.05 % external ointment Apply thin layer to affected area daily      Coenzyme Q10 (COQ-10 PO) Take 1 tablet by mouth daily      irbesartan (AVAPRO) 75 MG tablet Take 1 tablet by mouth daily at 2 pm      KRILL OIL PO Take by mouth every other day.      Multiple Vitamin (MULTI-VITAMIN) per tablet Take 1 tablet by mouth daily      rosuvastatin (CRESTOR) 20 MG tablet Take 20 mg by mouth daily      spironolactone (ALDACTONE) 25 MG tablet Take 1 tablet (25 mg) by mouth at bedtime. 90 tablet 4    VITAMIN D, CHOLECALCIFEROL, PO Take 1 capsule by mouth daily         ALLERGIES     Allergies   Allergen Reactions    Dust Mites Other (See Comments)    Iodine-131 Angioedema, Hives and Itching       PAST MEDICAL HISTORY:  Past Medical History:   Diagnosis Date    Dyslipidemia     Hypertension     Osteoarthritis        PAST SURGICAL HISTORY:  Past Surgical History:   Procedure Laterality Date    CATARACT EXTRACTION, BILATERAL  04/2023    OPEN REDUCTION INTERNAL FIXATION FEMUR DISTAL Right 12/28/2023    Procedure: Open  reduction internal fixation, right distal femur fracture with retrograde nail;  Surgeon: Hal Kimball MD;  Location: [facility] OR       FAMILY HISTORY:  Family History   Problem Relation Age of Onset    Breast Cancer Mother 80    Myocardial Infarction Father 57         @ 63 from MI       SOCIAL HISTORY:  Social History     Socioeconomic History    Marital status:      Spouse name: None    Number of children: None    Years of education: None    Highest education level: None   Tobacco Use    Smoking status: Never    Smokeless tobacco: Never   Vaping Use    Vaping status: Never Used   Substance and Sexual Activity    Alcohol use: No    Sexual activity: Yes     Social Drivers of Health     Financial Resource Strain: Low Risk  (2024)    Financial Resource Strain     Within the past 12 months, have you or your family members you live with been unable to get utilities (heat, electricity) when it was really needed?: No   Food Insecurity: Low Risk  (2024)    Food Insecurity     Within the past 12 months, did you worry that your food would run out before you got money to buy more?: No     Within the past 12 months, did the food you bought just not last and you didn t have money to get more?: No   Transportation Needs: Low Risk  (2024)    Transportation Needs     Within the past 12 months, has lack of transportation kept you from medical appointments, getting your medicines, non-medical meetings or appointments, work, or from getting things that you need?: No   Physical Activity: Unknown (2024)    Exercise Vital Sign     Days of Exercise per Week: 3 days   Stress: No Stress Concern Present (2024)    [employer] [employer] - Occupational Stress Questionnaire     Feeling of Stress : Not at all    Received from [facility] [employer] [facility]    Social Connections   Interpersonal Safety: Low Risk  (2024)    Interpersonal Safety     Do you feel physically and  "emotionally safe where you currently live?: Yes     Within the past 12 months, have you been hit, slapped, kicked or otherwise physically hurt by someone?: No     Within the past 12 months, have you been humiliated or emotionally abused in other ways by your partner or ex-partner?: No   Housing Stability: High Risk (5/16/2024)    Housing Stability     Do you have housing? : No     Are you worried about losing your housing?: No       Review of Systems:  Skin:  not assessed       Eyes:  not assessed      ENT:  not assessed      Respiratory:  Negative       Cardiovascular:    Positive for;palpitations;dizziness;fatigue    Gastroenterology: not assessed      Genitourinary:  not assessed      Musculoskeletal:  not assessed      Neurologic:  not assessed      Psychiatric:  not assessed      Heme/Lymph/Imm:  not assessed      Endocrine:  not assessed        Physical Exam:  Vitals: /78   Pulse 84   Ht 1.753 m (5' 9\")   Wt 74.2 kg (163 lb 9.6 oz)   LMP  (LMP Unknown)   BMI 24.16 kg/m      Constitutional:  cooperative, alert and oriented, well developed, well nourished, in no acute distress overweight Uses walker    Skin:  warm and dry to the touch, no apparent skin lesions or masses noted          Head:  normocephalic, no masses or lesions        Eyes:  pupils equal and round, conjunctivae and lids unremarkable, sclera white, no xanthalasma, EOMS intact, no nystagmus        Lymph:      ENT:  no pallor or cyanosis, dentition good        Neck:  no carotid bruit        Respiratory:  normal breath sounds, clear to auscultation, normal A-P diameter, normal symmetry, normal respiratory excursion, no use of accessory muscles         Cardiac: no murmurs, gallops or rubs detected irregularly irregular rhythm           Rate controlled  pulses full and equal                                        GI:           Extremities and Muscular Skeletal:  no spinal abnormalities noted;normal muscle strength and tone   bilateral LE " edema;trace;varicose vein          Neurological:  no gross motor deficits        Psych:  affect appropriate, oriented to time, person and place        CC  Talat Medrano MD  [id number]  [place] [id number]

## 2024-10-23 NOTE — PROGRESS NOTES
It was a pleasure seeing you today and thank you for allowing me to be a part of your health care team.  Should   you have any questions regarding your visit or future needs please feel free to reach out to my care team for assistance.      Thank you, Dr. Talat Medrano        **Nursing: [phone]       **Scheduling: [phone]

## 2024-10-23 NOTE — LETTER
10/23/2024    Christy Bunn MD  [address] [facility] [address]  [facility] [address]    RE: Mercedes Nelson       Dear Colleague,     I had the pleasure of seeing Mercedes Nelson in the [facility].  It was a pleasure seeing you today and thank you for allowing me to be a part of your health care team.  Should   you have any questions regarding your visit or future needs please feel free to reach out to my care team for assistance.      Thank you, Dr. Talat Medrano        **Nursing: [phone]       **Scheduling: [phone]     HPI and Plan:   Mercedes is a very nice 78-year-old woman who follows with my partner Dr. Best Muhammad.  I met her urgently in May because of newly diagnosed atrial fibrillation.     She has longstanding labile hypertension, hypercholesterolemia, hypokalemia, and dependent edema with varicose veins.  In December 2023 she was seen in the ER for her labile hypertension.  Later in December she again was seen after mechanical fall resulting in a fracture of her right femur.  On all these visits and previous event monitoring she is not been found to have A-fib although there was suspicion that she may have it.     This spring she noted she had increased cough.  She has worsening peripheral edema left greater than right and PND.     She was seen in her primary care office where chest x-ray demonstrated enlarged cardiac silhouette.  Subsequent echocardiogram noted atrial fibrillation.  She is completely unaware of her atrial fibrillation.     Family history is significant for mother dying at age 94.  Her father had coronary disease at age 63.  She is a lifelong non-smoker.  She does not drink alcohol.  She does not appear to have diabetes although some sugars are elevated possibly impaired fasting glucose versus nonfasting blood draws.     She denies exertional chest arm neck jaw or shoulder discomfort.  She has no lightheadedness dizziness syncope or near syncope.   She states she may be aware of her rhythm when she lies down at nighttime.       An April echocardiogram demonstrates ejection fraction of 50 to 55% with moderate concentric left ventricular hypertrophy she has mild to moderate mitral regurgitation and tricuspid regurgitation.  Ascending aorta is borderline dilated at 3.8 cm.  IVC is described as dilated with decreased respiratory variation.  Pulmonary pressures are estimated to be 19 mmHg plus right atrial pressure     Review of the chart shows normal CBC.  She had an N-terminal proBNP of 3644.  Glucose is elevated at 126.  Most recent potassium is 3.3 EKG demonstrates atrial fibrillation with a rate of 85 with left anterior fascicular block.     Workup to date includes a Lexiscan which appears to be negative for ischemia.  A 7-day Zio patch showing average heart rate of 88 ranging from .  I also checked an aldosterone renin ratio that came back at 44.  An endocrinology consult has been placed with an appointment for December.  I have been treating her blood pressure and peripheral edema with spironolactone last time increasing to 50 mg daily.    She returns to the clinic at this time stating she feels weak and fatigued.  Of note she has disrupted sleep at nighttime and is up multiple times.  She does not think it is getting up to go to the bathroom.  She has not scheduled a sleep study yet.     Assessment and plan.  Olga has no symptoms to suggest ischemia and this is supported by her Lexiscan     Home blood pressures are very well-controlled.  It is high today in the office.  Unfortunately the increase in spironolactone appears to have triggered hyponatremia.  I recommended she cut back to 25 mg daily.  I will have her return in 1 month to see my FRANKLIN and recheck a BMP.     Heart failure appears a bit improved with an N-terminal proBNP of 2038..  Down the road we consider adding Jardiance to her regimen.     We talked about her peripheral edema, the  importance of a low-salt diet, the importance of diet high in fresh fruits and vegetables to increase her potassium and magnesium.  We also talked about the importance of elevating her legs.  We also talked about the importance of exercise and the concept of dependent edema and non mobile elderly.  Fortunately this may worsen again at a lower dose of spironolactone.  It looks well-controlled today     She does have hyper Manuel/renin level.  She tells me endocrinology is now pushed her back to January.    I think a lot of her symptoms are daytime somnolence fatigue and tiredness due to her disrupted sleep at nighttime.  I have encouraged her to go ahead and schedule her sleep study.       I will also schedule a stress MRI given her mildly decreased left ventricular function.  I assume this is all longstanding poorly treated high blood pressure and diastolic dysfunction.  I will do the MRI to make sure we do not also have an infiltrative cardiomyopathy     A-fib rate appears to be adequately controlled with the change to carvedilol.     Her MRI demonstrated mildly reduced RV and LV function with LVEF of 53%.  Small pericardial effusion.  Late gadolinium enhancement showed no MI, fibrosis or infiltrative disease.    Her  asks if she needs a pacemaker and we reviewed her previous Zio Patch.  Her  also asks if this is not anemia and she needs a blood transfusion.  Most recent hemoglobin was back to 13.4.     Thank you for allow me to participate in this patient's care.  Sincerely,                                Talat Medrano MD [facility]    Today's clinic visit entailed:  Review of the result(s) of each unique test - lab work, MRI  Ordering of each unique test  Prescription drug management  40 minutes spent by me on the date of the encounter doing chart review, history and exam, documentation and further activities per the note  Provider  Link to [facility] Help Grid     The level of medical decision making during  this visit was of moderate complexity.      Orders Placed This Encounter   Procedures     Basic metabolic panel     Follow-Up with Cardiology FRANKLIN       Orders Placed This Encounter   Medications     KRILL OIL PO     Sig: Take by mouth every other day.     spironolactone (ALDACTONE) 25 MG tablet     Sig: Take 1 tablet (25 mg) by mouth at bedtime.     Dispense:  90 tablet     Refill:  4       Medications Discontinued During This Encounter   Medication Reason     albuterol (PROAIR HFA/PROVENTIL HFA/VENTOLIN HFA) 108 (90 Base) MCG/ACT inhaler      spironolactone (ALDACTONE) 50 MG tablet      B Complex Vitamins (VITAMIN  B COMPLEX) tablet      prednisoLONE acetate (PRED FORTE) 1 % ophthalmic suspension          Encounter Diagnoses   Name Primary?     SOB (shortness of breath)      Peripheral edema      PAF (paroxysmal atrial fibrillation) (H)      Hypokalemia      Labile hypertension      Acute on chronic diastolic congestive heart failure (H) Yes     Hyponatremia        CURRENT MEDICATIONS:  Current Outpatient Medications   Medication Sig Dispense Refill     acetaminophen (TYLENOL) 325 MG tablet Take 2 tablets (650 mg) by mouth every 4 hours as needed for other (For optimal non-opioid multimodal pain management to improve pain control.) 30 tablet 0     apixaban ANTICOAGULANT (ELIQUIS ANTICOAGULANT) 5 MG tablet Take 1 tablet (5 mg) by mouth 2 times daily 180 tablet 4     carvedilol (COREG) 25 MG tablet Take 1 tablet (25 mg) by mouth 2 times daily (with meals) 180 tablet 4     clobetasol (TEMOVATE) 0.05 % external ointment Apply thin layer to affected area daily       Coenzyme Q10 (COQ-10 PO) Take 1 tablet by mouth daily       irbesartan (AVAPRO) 75 MG tablet Take 1 tablet by mouth daily at 2 pm       KRILL OIL PO Take by mouth every other day.       Multiple Vitamin (MULTI-VITAMIN) per tablet Take 1 tablet by mouth daily       rosuvastatin (CRESTOR) 20 MG tablet Take 20 mg by mouth daily       spironolactone (ALDACTONE)  25 MG tablet Take 1 tablet (25 mg) by mouth at bedtime. 90 tablet 4     VITAMIN D, CHOLECALCIFEROL, PO Take 1 capsule by mouth daily         ALLERGIES     Allergies   Allergen Reactions     Dust Mites Other (See Comments)     Iodine-131 Angioedema, Hives and Itching       PAST MEDICAL HISTORY:  Past Medical History:   Diagnosis Date     Dyslipidemia      Hypertension      Osteoarthritis        PAST SURGICAL HISTORY:  Past Surgical History:   Procedure Laterality Date     CATARACT EXTRACTION, BILATERAL  2023     OPEN REDUCTION INTERNAL FIXATION FEMUR DISTAL Right 2023    Procedure: Open reduction internal fixation, right distal femur fracture with retrograde nail;  Surgeon: Hal Kimball MD;  Location:  OR       FAMILY HISTORY:  Family History   Problem Relation Age of Onset     Breast Cancer Mother 80     Myocardial Infarction Father 57         @ 63 from [place]       SOCIAL HISTORY:  Social History     Socioeconomic History     Marital status:      Spouse name: None     Number of children: None     Years of education: None     Highest education level: None   Tobacco Use     Smoking status: Never     Smokeless tobacco: Never   Vaping Use     Vaping status: Never Used   Substance and Sexual Activity     Alcohol use: No     Sexual activity: Yes     Social Drivers of Health     Financial Resource Strain: Low Risk  (2024)    Financial Resource Strain      Within the past 12 months, have you or your family members you live with been unable to get utilities (heat, electricity) when it was really needed?: No   Food Insecurity: Low Risk  (2024)    Food Insecurity      Within the past 12 months, did you worry that your food would run out before you got money to buy more?: No      Within the past 12 months, did the food you bought just not last and you didn t have money to get more?: No   Transportation Needs: Low Risk  (2024)    Transportation Needs      Within the past 12 months, has lack of  "transportation kept you from medical appointments, getting your medicines, non-medical meetings or appointments, work, or from getting things that you need?: No   Physical Activity: Unknown (5/16/2024)    Exercise Vital Sign      Days of Exercise per Week: 3 days   Stress: No Stress Concern Present (5/16/2024)    [employer] - Occupational Stress Questionnaire      Feeling of Stress : Not at all    Received from [facility]    Social Connections   Interpersonal Safety: Low Risk  (8/16/2024)    Interpersonal Safety      Do you feel physically and emotionally safe where you currently live?: Yes      Within the past 12 months, have you been hit, slapped, kicked or otherwise physically hurt by someone?: No      Within the past 12 months, have you been humiliated or emotionally abused in other ways by your partner or ex-partner?: No   Housing Stability: High Risk (5/16/2024)    Housing Stability      Do you have housing? : No      Are you worried about losing your housing?: No       Review of Systems:  Skin:  not assessed       Eyes:  not assessed      ENT:  not assessed      Respiratory:  Negative       Cardiovascular:    Positive for;palpitations;dizziness;fatigue    Gastroenterology: not assessed      Genitourinary:  not assessed      Musculoskeletal:  not assessed      Neurologic:  not assessed      Psychiatric:  not assessed      Heme/Lymph/Imm:  not assessed      Endocrine:  not assessed        Physical Exam:  Vitals: /78   Pulse 84   Ht 1.753 m (5' 9\")   Wt 74.2 kg (163 lb 9.6 oz)   LMP  (LMP Unknown)   BMI 24.16 kg/m      Constitutional:  cooperative, alert and oriented, well developed, well nourished, in no acute distress overweight Uses walker    Skin:  warm and dry to the touch, no apparent skin lesions or masses noted          Head:  normocephalic, no masses or lesions        Eyes:  pupils equal and round, conjunctivae and lids " unremarkable, sclera white, no xanthalasma, EOMS intact, no nystagmus        Lymph:      ENT:  no pallor or cyanosis, dentition good        Neck:  no carotid bruit        Respiratory:  normal breath sounds, clear to auscultation, normal A-P diameter, normal symmetry, normal respiratory excursion, no use of accessory muscles         Cardiac: no murmurs, gallops or rubs detected irregularly irregular rhythm           Rate controlled  pulses full and equal                                        GI:           Extremities and Muscular Skeletal:  no spinal abnormalities noted;normal muscle strength and tone   bilateral LE edema;trace;varicose vein          Neurological:  no gross motor deficits        Psych:  affect appropriate, oriented to time, person and place        CC  Talat Medrano MD  [address]  [place] [address]                  Thank you for allowing me to participate in the care of your patient.      Sincerely,     Talat Medrano MD     [facility]  cc:   Talat Medrano MD  [address]  [place] [address]

## 2024-10-31 ENCOUNTER — TELEPHONE (OUTPATIENT)
Dept: CARDIOLOGY | Facility: CLINIC | Age: 79
End: 2024-10-31
Payer: COMMERCIAL

## 2024-10-31 DIAGNOSIS — I50.33 ACUTE ON CHRONIC DIASTOLIC CONGESTIVE HEART FAILURE (H): ICD-10-CM

## 2024-10-31 RX ORDER — SPIRONOLACTONE 50 MG/1
TABLET, FILM COATED ORAL
COMMUNITY
Start: 2024-10-31

## 2024-10-31 NOTE — TELEPHONE ENCOUNTER
Called patient back, she says she saw Dr Medrano a week ago at which time he decreased her spironolactone from 50mg to 25mg daily. She thought she was taking two spironolactone tabs daily but she just realized she has only been taking one 50mg tab. She is concerned that her hyponatremia is now worse because of this. Recommended she decrease her spironolactone to 25mg daily as previously advised. She will cut her 50mg tabs in half going forward, declines new Rx at this time. Confirmed with pharmacy that pills can be cut. Will message Dr Medrano to verify if labs should be rechecked again.

## 2024-10-31 NOTE — TELEPHONE ENCOUNTER
[facility] Call Center    Phone Message    May a detailed message be left on voicemail: yes     Reason for Call: Other: Pt is calling to inform that she gave the wrong information about her medications at the last OV.  She was taking 1 spironolactone rather that ne 2 she stated.  She is concerned about th sodium lave but has not cut her dose in half as it was only 1 tablet.  Please call pt to discuss next steps.     Action Taken: Other: cardio    Travel Screening: Not Applicable    Thank you!  [facility]       Date of Service:

## 2024-11-01 NOTE — TELEPHONE ENCOUNTER
Talat Medrano MD  You14 minutes ago (9:45 AM)     No.  That is fine      You  Talat Medrano MD; [facility] 214 minutes ago (9:45 AM)     Repeat BMP is currently scheduled for 11/21 prior to FRANKLIN follow up- is this sufficient or move it up?      Talat Medrano MD  You23 minutes ago (9:36 AM)     No.  Sodium is always low.  Decrease spironolactone and then will recheck         Spoke to patient, reviewed Dr Medrano's recommendation to proceed with labs 11/21 as scheduled, no need to check sooner. She expressed understanding.

## 2024-11-21 ENCOUNTER — LAB (OUTPATIENT)
Dept: LAB | Facility: CLINIC | Age: 79
End: 2024-11-21
Payer: COMMERCIAL

## 2024-11-21 DIAGNOSIS — E87.1 HYPONATREMIA: ICD-10-CM

## 2024-11-21 LAB
ANION GAP SERPL CALCULATED.3IONS-SCNC: 10 MMOL/L (ref 7–15)
BUN SERPL-MCNC: 24.8 MG/DL (ref 8–23)
CALCIUM SERPL-MCNC: 9.7 MG/DL (ref 8.8–10.4)
CHLORIDE SERPL-SCNC: 92 MMOL/L (ref 98–107)
CREAT SERPL-MCNC: 1.06 MG/DL (ref 0.51–0.95)
EGFRCR SERPLBLD CKD-EPI 2021: 53 ML/MIN/1.73M2
GLUCOSE SERPL-MCNC: 96 MG/DL (ref 70–99)
HCO3 SERPL-SCNC: 23 MMOL/L (ref 22–29)
POTASSIUM SERPL-SCNC: 4.8 MMOL/L (ref 3.4–5.3)
SODIUM SERPL-SCNC: 125 MMOL/L (ref 135–145)

## 2024-11-27 ENCOUNTER — OFFICE VISIT (OUTPATIENT)
Dept: FAMILY MEDICINE | Facility: CLINIC | Age: 79
End: 2024-11-27
Payer: COMMERCIAL

## 2024-11-27 VITALS
RESPIRATION RATE: 26 BRPM | DIASTOLIC BLOOD PRESSURE: 70 MMHG | SYSTOLIC BLOOD PRESSURE: 112 MMHG | WEIGHT: 166 LBS | TEMPERATURE: 97.4 F | BODY MASS INDEX: 24.59 KG/M2 | HEART RATE: 63 BPM | OXYGEN SATURATION: 98 % | HEIGHT: 69 IN

## 2024-11-27 DIAGNOSIS — I48.0 PAF (PAROXYSMAL ATRIAL FIBRILLATION) (H): Primary | ICD-10-CM

## 2024-11-27 DIAGNOSIS — I50.30 HEART FAILURE WITH PRESERVED EJECTION FRACTION, NYHA CLASS I (H): ICD-10-CM

## 2024-11-27 DIAGNOSIS — E87.1 HYPONATREMIA: ICD-10-CM

## 2024-11-27 DIAGNOSIS — I10 BENIGN ESSENTIAL HYPERTENSION: ICD-10-CM

## 2024-11-27 DIAGNOSIS — Z23 NEED FOR PROPHYLACTIC VACCINATION AND INOCULATION AGAINST INFLUENZA: ICD-10-CM

## 2024-11-27 PROCEDURE — 99214 OFFICE O/P EST MOD 30 MIN: CPT | Performed by: INTERNAL MEDICINE

## 2024-11-27 PROCEDURE — 90662 IIV NO PRSV INCREASED AG IM: CPT | Performed by: INTERNAL MEDICINE

## 2024-11-27 PROCEDURE — G0008 ADMIN INFLUENZA VIRUS VAC: HCPCS | Performed by: INTERNAL MEDICINE

## 2024-11-27 ASSESSMENT — PAIN SCALES - GENERAL: PAINLEVEL_OUTOF10: NO PAIN (0)

## 2024-11-27 NOTE — PROGRESS NOTES
Mercedes was seen today for follow up.    Diagnoses and all orders for this visit:  Came with her     PAF (paroxysmal atrial fibrillation) (H)  HR under control  On apixaban. Counseled on fall precautions due to anticoagulation and acetaminophen for pain.   Avoid OTC NSAIDS like ibuprofen,motrin or aleve as you are on blood thinner.  Tylenol which is acetaminophen is ok to use .      Benign essential hypertension  On spironolactone.  Well controlled     Hyponatremia   Follows with cardiology. Continue to monitor.    Heart failure with preserved ejection fraction, NYHA class I (H)  Well compensated     I recommended she keep her upcoming endocrinology appointment for hyperaldosteronism    Other  She states overall she has been doing well.   She is interested in the influenza vaccine today. She will get the RSV vaccine and Covid booster at her pharmacy at her convenience.      Subjective   Mercedes is a 78 year old, presenting for the following health issues:  Follow Up        11/27/2024     2:06 PM   Additional Questions   Roomed by Priyanka   Accompanied by sami Johnson     History of Present Illness       Heart Failure:  She presents for follow up of heart failure. She is not experiencing shortness of breath at night, with rest or with activity  She is not experiencing any lower extremity edema.   She denies orthopenea and is not coughing at night. Patient is checking weight daily. She has recently had a None.  She has no side effects from medications.  She has has a medical visit for heart failure 2 times since the last visit.    Hypertension: She presents for follow up of hypertension.  She does not check blood pressure  regularly outside of the clinic. Outpatient blood pressures have not been over 140/90. She does not follow a low salt diet.     She eats 4 or more servings of fruits and vegetables daily.She consumes 1 sweetened beverage(s) daily.She exercises with enough effort to increase her heart rate 20 to 29  "minutes per day.  She exercises with enough effort to increase her heart rate 3 or less days per week.   She is taking medications regularly.     Last Echo:   Echo result w/o MOPS: Interpretation Summary The visual ejection fraction is 50-55%.Left ventricular systolic function is low normal.There is moderate (2+) mitral regurgitation.There is mild to moderate (1-2+) tricuspid regurgitation.There is trace aortic regurgitation.There is mild to moderate (1-2+) pulmonic valvular regurgitation.Ascending aorta dilatation is present.The rhythm was atrial fibrillation.            Review of Systems  Constitutional, HEENT, cardiovascular, pulmonary, GI, , musculoskeletal, neuro, skin, endocrine and psych systems are negative, except as otherwise noted.      Objective    /70 (BP Location: Right arm, Patient Position: Chair, Cuff Size: Adult Regular)   Pulse 63   Temp 97.4  F (36.3  C) (Temporal)   Resp 26   Ht 1.753 m (5' 9\")   Wt 75.3 kg (166 lb)   LMP  (LMP Unknown)   SpO2 98%   BMI 24.51 kg/m    Body mass index is 24.51 kg/m .      Physical Exam     GENERAL APPEARANCE: healthy, alert and no distress  HENT: ear canals and TM's normal hearing aids in ears  MSK: arthritic changes in bilateral hands  RESP: lungs clear to auscultation - no rales, rhonchi or wheezes  CV: regular rates and rhythm, normal S1 S2, no S3        Signed Electronically by: Christy Bunn MD  This document serves as a record of the services and decisions personally performed and made by Dr. Bunn. It was created on her behalf by Susu Oseguera, a trained medical scribe. The creation of this document is based the provider's statements to the medical scribe.    "

## 2024-11-27 NOTE — PATIENT INSTRUCTIONS
You are due for new Covid booster, flu shot  and RSV vaccine     There is a new shingles vaccine available called shingrex  It is a series of 2 shots 2-6 months apart.  Considered more than 90% effective.  Please go to any pharmacy to get the  vaccine      Avoid OTC NSAIDS like ibuprofen,motrin or aleve as you are on blood thinner.  Tylenol which is acetaminophen is ok to use .      Follow up in 6 months.  Seek sooner medical attention if there is any worsening of symptoms or problems.

## 2024-12-05 ENCOUNTER — LAB (OUTPATIENT)
Dept: LAB | Facility: CLINIC | Age: 79
End: 2024-12-05
Payer: COMMERCIAL

## 2024-12-05 DIAGNOSIS — I48.0 PAF (PAROXYSMAL ATRIAL FIBRILLATION) (H): ICD-10-CM

## 2024-12-05 DIAGNOSIS — R09.89 LABILE HYPERTENSION: ICD-10-CM

## 2024-12-05 DIAGNOSIS — E87.6 HYPOKALEMIA: ICD-10-CM

## 2024-12-05 DIAGNOSIS — R60.0 PERIPHERAL EDEMA: ICD-10-CM

## 2024-12-05 DIAGNOSIS — E87.1 HYPONATREMIA: ICD-10-CM

## 2024-12-05 DIAGNOSIS — I50.33 ACUTE ON CHRONIC DIASTOLIC CONGESTIVE HEART FAILURE (H): ICD-10-CM

## 2024-12-05 DIAGNOSIS — R06.02 SOB (SHORTNESS OF BREATH): ICD-10-CM

## 2024-12-05 LAB
ANION GAP SERPL CALCULATED.3IONS-SCNC: 10 MMOL/L (ref 7–15)
BUN SERPL-MCNC: 25.6 MG/DL (ref 8–23)
CALCIUM SERPL-MCNC: 9.6 MG/DL (ref 8.8–10.4)
CHLORIDE SERPL-SCNC: 94 MMOL/L (ref 98–107)
CREAT SERPL-MCNC: 0.92 MG/DL (ref 0.51–0.95)
EGFRCR SERPLBLD CKD-EPI 2021: 62 ML/MIN/1.73M2
GLUCOSE SERPL-MCNC: 98 MG/DL (ref 70–99)
HCO3 SERPL-SCNC: 25 MMOL/L (ref 22–29)
POTASSIUM SERPL-SCNC: 4.2 MMOL/L (ref 3.4–5.3)
SODIUM SERPL-SCNC: 129 MMOL/L (ref 135–145)

## (undated) DEVICE — GLOVE BIOGEL PI SZ 7.5 40875

## (undated) DEVICE — DRSG ABDOMINAL 07 1/2X8" 7197D

## (undated) DEVICE — DRSG GAUZE 4X4" TRAY

## (undated) DEVICE — SU VICRYL 2-0 CT-2 27" UND J269H

## (undated) DEVICE — CAST PADDING 6IN COTTON WEBRIL STERILE 2554

## (undated) DEVICE — DRAPE X-RAY TUBE 00-901169-01-OEC

## (undated) DEVICE — SUCTION CANISTER MEDIVAC LINER 3000ML W/LID 65651-530

## (undated) DEVICE — DRSG XEROFORM 5X9" 8884431605

## (undated) DEVICE — GLOVE BIOGEL PI SZ 7.0 40870

## (undated) DEVICE — GLOVE BIOGEL PI MICRO SZ 7.5 48575

## (undated) DEVICE — BLADE KNIFE SURG 10 371110

## (undated) DEVICE — WIRE GUIDE 3.2X400MM  357.399

## (undated) DEVICE — SU VICRYL 0 CT-1 36" J346H

## (undated) DEVICE — SPONGE LAP 18X18" X8435

## (undated) DEVICE — SUCTION TIP YANKAUER W/O VENT K86

## (undated) DEVICE — PAD FOAM TRIANGLE KNEE 193-P

## (undated) DEVICE — LINEN HALF SHEET 5512

## (undated) DEVICE — PACK TOTAL HIP RIDGES LATEX PO15HIFSG

## (undated) DEVICE — PREP POVIDONE IODINE SOLUTION 10% 4OZ BOTTLE 29906-004

## (undated) DEVICE — PREP DURAPREP 26ML APL 8630

## (undated) DEVICE — GLOVE BIOGEL PI SZ 6.5 40865

## (undated) DEVICE — Device

## (undated) DEVICE — LINEN TOWEL PACK X5 5464

## (undated) DEVICE — LINEN FULL SHEET 5511

## (undated) DEVICE — ESU GROUND PAD ADULT W/CORD E7507

## (undated) DEVICE — SOL NACL 0.9% IRRIG 1000ML BOTTLE 2F7124

## (undated) DEVICE — BAG CLEAR TRASH 1.3M 39X33" P4040C

## (undated) DEVICE — LINEN ORTHO ACL PACK 5447

## (undated) DEVICE — ROD SYN REAMER BALL TIP 2.5X950MM  351.706S

## (undated) DEVICE — DRILL BIT QUICK COUPLING 3 FLUTE 4.2MMX145MM NDL  POINT

## (undated) DEVICE — DRILL BIT QUICK COUPLING 3 FLUTE 4.2MMX330/100MM CALIBRATE

## (undated) RX ORDER — TRANEXAMIC ACID 10 MG/ML
INJECTION, SOLUTION INTRAVENOUS
Status: DISPENSED
Start: 2023-12-28

## (undated) RX ORDER — EPHEDRINE SULFATE 50 MG/ML
INJECTION, SOLUTION INTRAMUSCULAR; INTRAVENOUS; SUBCUTANEOUS
Status: DISPENSED
Start: 2023-12-28

## (undated) RX ORDER — DEXMEDETOMIDINE HYDROCHLORIDE 4 UG/ML
INJECTION, SOLUTION INTRAVENOUS
Status: DISPENSED
Start: 2023-12-28

## (undated) RX ORDER — METHADONE HYDROCHLORIDE 10 MG/ML
INJECTION, SOLUTION INTRAMUSCULAR; INTRAVENOUS; SUBCUTANEOUS
Status: DISPENSED
Start: 2023-12-28

## (undated) RX ORDER — MAGNESIUM SULFATE HEPTAHYDRATE 40 MG/ML
INJECTION, SOLUTION INTRAVENOUS
Status: DISPENSED
Start: 2023-12-28

## (undated) RX ORDER — ACETAMINOPHEN 325 MG/1
TABLET ORAL
Status: DISPENSED
Start: 2023-12-28

## (undated) RX ORDER — VANCOMYCIN HYDROCHLORIDE 1 G/20ML
INJECTION, POWDER, LYOPHILIZED, FOR SOLUTION INTRAVENOUS
Status: DISPENSED
Start: 2023-12-28

## (undated) RX ORDER — CEFAZOLIN SODIUM/WATER 2 G/20 ML
SYRINGE (ML) INTRAVENOUS
Status: DISPENSED
Start: 2023-12-28